# Patient Record
Sex: MALE | Race: WHITE | NOT HISPANIC OR LATINO | Employment: FULL TIME | URBAN - METROPOLITAN AREA
[De-identification: names, ages, dates, MRNs, and addresses within clinical notes are randomized per-mention and may not be internally consistent; named-entity substitution may affect disease eponyms.]

---

## 2017-07-31 ENCOUNTER — APPOINTMENT (OUTPATIENT)
Dept: GENERAL RADIOLOGY | Facility: CLINIC | Age: 37
End: 2017-07-31
Attending: EMERGENCY MEDICINE
Payer: COMMERCIAL

## 2017-07-31 ENCOUNTER — HOSPITAL ENCOUNTER (EMERGENCY)
Facility: CLINIC | Age: 37
Discharge: HOME | End: 2017-07-31
Attending: EMERGENCY MEDICINE | Admitting: EMERGENCY MEDICINE
Payer: COMMERCIAL

## 2017-07-31 ENCOUNTER — TELEPHONE (OUTPATIENT)
Dept: NURSING | Facility: CLINIC | Age: 37
End: 2017-07-31

## 2017-07-31 VITALS
SYSTOLIC BLOOD PRESSURE: 105 MMHG | WEIGHT: 175 LBS | DIASTOLIC BLOOD PRESSURE: 75 MMHG | HEART RATE: 59 BPM | RESPIRATION RATE: 18 BRPM | BODY MASS INDEX: 26.61 KG/M2 | OXYGEN SATURATION: 97 % | TEMPERATURE: 98.4 F

## 2017-07-31 DIAGNOSIS — R07.89 CHEST WALL PAIN: ICD-10-CM

## 2017-07-31 LAB
INTERPRETATION ECG - MUSE: NORMAL
TROPONIN I BLD-MCNC: 0 UG/L (ref 0–0.1)

## 2017-07-31 PROCEDURE — 99285 EMERGENCY DEPT VISIT HI MDM: CPT | Mod: 25

## 2017-07-31 PROCEDURE — 84484 ASSAY OF TROPONIN QUANT: CPT

## 2017-07-31 PROCEDURE — 71020 XR CHEST 2 VW: CPT

## 2017-07-31 PROCEDURE — 93005 ELECTROCARDIOGRAM TRACING: CPT

## 2017-07-31 ASSESSMENT — ENCOUNTER SYMPTOMS
LIGHT-HEADEDNESS: 0
BACK PAIN: 0
COUGH: 0
DYSURIA: 0
NAUSEA: 0
FREQUENCY: 0
CHILLS: 0
RHINORRHEA: 1
DIARRHEA: 0
VOMITING: 0
SHORTNESS OF BREATH: 0
FEVER: 0
ABDOMINAL PAIN: 0
HEADACHES: 0

## 2017-07-31 NOTE — TELEPHONE ENCOUNTER
"Asked to triage patient who is the list for being seen by NP for \"chest tightness\" and declined to be transferred to a Triage nurse.    Call to patient and it went to default VM.  LMTCB.  Need more information.    Also patient needs to establish care with a new PCP.  Maine Tubbs RN    "

## 2017-07-31 NOTE — ED AVS SNAPSHOT
Emergency Department    [address] [facility] [address]    Phone:  [phone]    Fax:  [phone]                                       Satnam Gonzales   MRN: [id number]    Department:   Emergency Department   Date of Visit:  7/31/2017           After Visit Summary Signature Page     I have received my discharge instructions, and my questions have been answered. I have discussed any challenges I see with this plan with the nurse or doctor.    ..........................................................................................................................................  Patient/Patient Representative Signature      ..........................................................................................................................................  Patient Representative Print Name and Relationship to Patient    ..................................................               ................................................  Date                                            Time    ..........................................................................................................................................  Reviewed by Signature/Title    ...................................................              ..............................................  Date                                                            Time

## 2017-07-31 NOTE — ED AVS SNAPSHOT
Emergency Department    [address] [facility] [address]    Phone:  [phone]    Fax:  [phone]                                       Satnam Gonzales   MRN: [id number]    Department:   Emergency Department   Date of Visit:  7/31/2017           Patient Information     Date Of Birth          1980        Your diagnoses for this visit were:     Chest wall pain        You were seen by Antonio Castaneda MD.      Follow-up Information     Follow up with [facility]. Schedule an appointment as soon as possible for a visit in 3 days.    Specialties:  Podiatry, Internal Medicine, Family Medicine    Contact information:    [address] [facility] [address]  [facility] [address] [phone]        Discharge Instructions         Chest Wall Pain: Costochondritis    The chest pain that you have had today is caused by costochondritis. This condition is caused by an inflammation of the cartilage joining your ribs to your breastbone. It is not caused by heart or lung problems. Your healthcare team has made sure that the chest pain you feel is not from a life threatening cause of chest pain such as heart attack, collapsed lung, blood clot in the lung, tear in the aorta, or esophageal rupture. The inflammation may have been brought on by a blow to the chest, lifting heavy objects, intense exercise, or an illness that made you cough and sneeze a lot. It often occurs during times of emotional stress. It can be painful, but it is not dangerous. It usually goes away in 1 to 2 weeks. But it may happen again. Rarely, a more serious condition may cause symptoms similar to costochondritis. That s why it s important to watch for the warning signs listed below.  Home care  Follow these guidelines when caring for yourself at home:    If you feel that emotional stress is a cause of your condition, try to figure out the sources of that stress. It may not be obvious. Learn ways to deal with the  stress in your life. This can include regular exercise, muscle relaxation, meditation, or simply taking time out for yourself.    You may use acetaminophen, ibuprofen, or naproxen to control pain, unless another pain medicine was prescribed. If you have liver or kidney disease or ever had a stomach ulcer, talk with your healthcare provider before using these medicines.    You can also help ease pain by using a hot, wet compress or heating pad. Use this with or without a medicated skin cream that helps relieves pain.    Do stretching exercise as advised by your provider.    Take any prescribed medicines as directed.  Follow-up care  Follow up with your healthcare provider, or as advised, if you do not start to get better in the next 2 days.  When to seek medical advice  Call your healthcare provider right away if any of these occur:    A change in the type of pain. Call if it feels different, becomes more serious, lasts longer, or spreads into your shoulder, arm, neck, jaw, or back.    Shortness of breath or pain gets worse when you breathe    Weakness, dizziness, or fainting    Cough with dark-colored sputum (phlegm) or blood    Abdominal pain    Dark red or black stools    Fever of 100.4 F (38 C) or higher, or as directed by your healthcare provider  Date Last Reviewed: 12/1/2016 2000-2017 [employer]. [address] [place] [address]. All rights reserved. This information is not intended as a substitute for professional medical care. Always follow your healthcare professional's instructions.          Future Appointments        Provider Department Dept Phone Center    8/1/2017 8:30 AM DEREK Chan [facility] [phone]       24 Hour Appointment Hotline       To make an appointment at any [facility], call [phone] ([phone]). If you don't have a family doctor or clinic, we will help you find one. [facility] are conveniently located to  serve the needs of you and your family.             Review of your medicines      Our records show that you are taking the medicines listed below. If these are incorrect, please call your family doctor or clinic.        Dose / Directions Last dose taken    NO ACTIVE MEDICATIONS        Refills:  0        ZYRTEC PO        Take  by mouth.   Refills:  0                Procedures and tests performed during your visit     EKG 12 lead    ISTAT troponin nursing POCT    Troponin POCT    XR Chest 2 Views      Orders Needing Specimen Collection     None      Pending Results     No orders found from 7/29/2017 to 8/1/2017.            Pending Culture Results     No orders found from 7/29/2017 to 8/1/2017.            Pending Results Instructions     If you had any lab results that were not finalized at the time of your Discharge, you can call the ED Lab Result RN at [phone]. You will be contacted by this team for any positive Lab results or changes in treatment. The nurses are available 7 days a week from 10A to 6:30P.  You can leave a message 24 hours per day and they will return your call.        Test Results From Your Hospital Stay        7/31/2017  7:15 PM      Narrative     XR CHEST 2 VW   7/31/2017 7:09 PM     HISTORY: Chest pain    COMPARISON: None.        Impression     IMPRESSION: Normal.    MOE CRUMP MD         7/31/2017  7:15 PM      Component Results     Component Value Ref Range & Units Status    Troponin I 0.00 0.00 - 0.10 ug/L Final                Clinical Quality Measure: Blood Pressure Screening     Your blood pressure was checked while you were in the emergency department today. The last reading we obtained was  BP: 105/75 . Please read the guidelines below about what these numbers mean and what you should do about them.  If your systolic blood pressure (the top number) is less than 120 and your diastolic blood pressure (the bottom number) is less than 80, then your blood pressure is normal. There is  "nothing more that you need to do about it.  If your systolic blood pressure (the top number) is 120-139 or your diastolic blood pressure (the bottom number) is 80-89, your blood pressure may be higher than it should be. You should have your blood pressure rechecked within a year by a primary care provider.  If your systolic blood pressure (the top number) is 140 or greater or your diastolic blood pressure (the bottom number) is 90 or greater, you may have high blood pressure. High blood pressure is treatable, but if left untreated over time it can put you at risk for heart attack, stroke, or kidney failure. You should have your blood pressure rechecked by a primary care provider within the next 4 weeks.  If your provider in the emergency department today gave you specific instructions to follow-up with your doctor or provider even sooner than that, you should follow that instruction and not wait for up to 4 weeks for your follow-up visit.        Thank you for choosing [facility]       Thank you for choosing [facility] for your care. Our goal is always to provide you with excellent care. Hearing back from our patients is one way we can continue to improve our services. Please take a few minutes to complete the written survey that you may receive in the mail after you visit with us. Thank you!        [employer] Information     [employer] lets you send messages to your doctor, view your test results, renew your prescriptions, schedule appointments and more. To sign up, go to [email or web address] . Click on \"Log in\" on the left side of the screen, which will take you to the Welcome page. Then click on \"Sign up Now\" on the right side of the page.     You will be asked to enter the access code listed below, as well as some personal information. Please follow the directions to create your username and password.     Your access code is: [email or web address]  Expires: 10/29/2017  7:53 PM     Your access code will  in 90 days. If you " need help or a new code, please call your [facility] clinic or [phone].        Care EveryWhere ID     This is your Care EveryWhere ID. This could be used by other organizations to access your [facility] medical records  [id number]        Equal Access to Services     MICHAEL US : Chelsea Mar, denny roblero, ladonna kaallori foss, amalia badillo. So [facility] [phone].    ATENCIÓN: Si habla español, tiene a rowland disposición servicios gratuitos de asistencia lingüística. Llame al [phone].    We comply with applicable federal civil rights laws and [place] laws. We do not discriminate on the basis of race, color, national origin, age, disability sex, sexual orientation or gender identity.            After Visit Summary       This is your record. Keep this with you and show to your community pharmacist(s) and doctor(s) at your next visit.

## 2017-07-31 NOTE — TELEPHONE ENCOUNTER
FYI: Patient has not been seen here since 2013:  Patient has history of early heart disease in his grandfather.  States he started having chest discomfort across his chest Friday with running, it subsided to the left side only, no radiation of pain or any other symptoms but it did not go away totally.  He has since been afraid to run or bike which his norm so he made an appointment to see the nurse Practioner tomorrow.    The discomfort has not subsided but it is right over his heart.  He is anxious about it now.    Patient instructed the only way to know what is happening and to rule out heart issues is to be assessed.  Instructed to get a ride emergently to the ED or call 911.    Patient promises he will seek emergency assessment.  He will also plan to re establish care in the near future.  Maine Tubbs RN

## 2017-07-31 NOTE — ED PROVIDER NOTES
History     Chief Complaint:  Chest Pain     HPI   Satnam Gonzales is an otherwise healthy, active, 37 year old male who presents with exertional chest pain. The patient reports he first developed sharp pain in his left chest while running 1.5 weeks ago. Pain did not radiate. He denies any shortness of breath. The pain subsided when he stopped running. He has continued to have the same pain whenever he runs and reports it is gradually worsening. The patient made an appointment to see his primary but was sent here instead for evaluation. On arrival to the ED, the patient is currently pain free. He states he had some nasal congestion last week but denies any recent cough or runny nose. He denies any nausea, vomiting, diarrhea, abdominal pain, headache, urinary symptoms, or leg swelling. No history of heart problems.     CARDIAC RISK FACTORS:  Sex:    Male  Tobacco:   No  Hypertension:   No  Hyperlipidemia:  No  Diabetes:   No  Family History:  Grandfather    PE/DVT RISK FACTORS:  Sex:    Male  Hormones:   No  Tobacco:   No  Cancer:   No  Travel:   No  Surgery:   No  Other immobilization: No  Personal history:  No  Family history:  No    Allergies:  No Known Allergies     Medications:    Zyrtec    Past Medical History:    Depression     Past Surgical History:    History reviewed. No pertinent surgical history.    Family History:    CAD, grandfather age 50    Social History:  Smoking status: No  Alcohol use: No  Marital Status:   [2]     Review of Systems   Constitutional: Negative for chills and fever.   HENT: Positive for rhinorrhea. Negative for congestion.    Respiratory: Negative for cough and shortness of breath.    Cardiovascular: Positive for chest pain. Negative for leg swelling.   Gastrointestinal: Negative for abdominal pain, diarrhea, nausea and vomiting.   Genitourinary: Negative for dysuria, frequency and urgency.   Musculoskeletal: Negative for back pain.   Neurological: Negative for  light-headedness and headaches.   All other systems reviewed and are negative.      Physical Exam   Patient Vitals for the past 24 hrs:   BP Temp Temp src Pulse Heart Rate Resp SpO2 Weight   07/31/17 1952 105/75 - - 59 - 18 97 % -   07/31/17 1824 124/84 98.4  F (36.9  C) Oral - 59 14 100 % 79.4 kg (175 lb)       Physical Exam  Nursing note and vitals reviewed.  Constitutional:   Awake alert  HENT:   Pharynx normal, tms normal, atraumatic  Mouth/Throat:  Oropharynx is clear and moist.   Eyes:    Conjunctivae normal and EOM are normal. Pupils are equal, round, and reactive to light.   Neck:    Normal range of motion. Neck supple. No tracheal deviation present.   Cardiovascular: Normal rate, regular rhythm, normal heart sounds and intact distal pulses.    Pulmonary/Chest:  Effort normal and breath sounds normal. No respiratory distress. No wheezes. No rales. No tenderness.   Abdominal:   Soft. The patient exhibits no mass. There is no tenderness. There is no rebound and no guarding.   Musculoskeletal:  Normal range of motion. No edema and no tenderness.   Lymphadenopathy:  No cervical adenopathy.   Neurological:  Alert and oriented to person, place, and time. No cranial nerve deficit. Normal muscle tone.   Skin:    Skin is warm and dry.   Psychiatric:   Normal mood and affect.       Emergency Department Course   ECG (18:22:51):  Rate 55 bpm. LA interval 172. QRS duration 108. QT/QTc 422/403. P-R-T axes 55 14 39. Sinus bradycardia. Otherwise normal ECG   Interpreted at 1847 by Antonio Castaneda MD.    Imaging:  Radiographic findings were communicated with the patient who voiced understanding of the findings.    X-ray Chest, 2 views:  Normal   Result per radiology.     Laboratory:  Troponin POCT: 0.00    Emergency Department Course:  Past medical records, nursing notes, and vitals reviewed.  1849: I performed an exam of the patient and obtained history, as documented above.  IV inserted and blood drawn.  ECG obtained,  results above.   The patient was sent for a chest x-ray while in the emergency department, findings above.    PERC negative    : I rechecked the patient. Explained findings to the patient.    I rechecked the patient.  Findings and plan explained to the Patient. Patient discharged home with instructions regarding supportive care, medications, and reasons to return. The importance of close follow-up was reviewed.     Impression & Plan      Medical Decision Makin year old male who is healthy and only cardiac risk factors are family history of his grandfather with some heart disease. The patient is an avid runner. About a week ago he started having some left sided sharp chest [place]. No pain with rest or nonexertion. The pain is sharp and stabbing. No shortness of breath. PERC score is negative. Chest x-ray is negative, no widened mediastinum or sign of dissection or aneurysm. ECG shows sinus bradycardia but no acute changes and troponin is negative. I think most likely this is chest wall pain. No sign of coronary ischemia and no sign of pulmonary embolism. Will discharge to home, follow up with [facility] and trial of over the counter Ibuprofen.     Diagnosis:    ICD-10-CM   1. Chest wall pain R07.89     Disposition: Discharged to home    Any Traylor  2017    EMERGENCY DEPARTMENT    I, Any Traylor, am serving as a scribe at 6:49 PM on 2017 to document services personally performed by Antonio Castaneda MD based on my observations and the provider's statements to me.        Antonio Castaneda MD  17 [phone]

## 2017-08-01 NOTE — DISCHARGE INSTRUCTIONS
Chest Wall Pain: Costochondritis    The chest pain that you have had today is caused by costochondritis. This condition is caused by an inflammation of the cartilage joining your ribs to your breastbone. It is not caused by heart or lung problems. Your healthcare team has made sure that the chest pain you feel is not from a life threatening cause of chest pain such as heart attack, collapsed lung, blood clot in the lung, tear in the aorta, or esophageal rupture. The inflammation may have been brought on by a blow to the chest, lifting heavy objects, intense exercise, or an illness that made you cough and sneeze a lot. It often occurs during times of emotional stress. It can be painful, but it is not dangerous. It usually goes away in 1 to 2 weeks. But it may happen again. Rarely, a more serious condition may cause symptoms similar to costochondritis. That s why it s important to watch for the warning signs listed below.  Home care  Follow these guidelines when caring for yourself at home:    If you feel that emotional stress is a cause of your condition, try to figure out the sources of that stress. It may not be obvious. Learn ways to deal with the stress in your life. This can include regular exercise, muscle relaxation, meditation, or simply taking time out for yourself.    You may use acetaminophen, ibuprofen, or naproxen to control pain, unless another pain medicine was prescribed. If you have liver or kidney disease or ever had a stomach ulcer, talk with your healthcare provider before using these medicines.    You can also help ease pain by using a hot, wet compress or heating pad. Use this with or without a medicated skin cream that helps relieves pain.    Do stretching exercise as advised by your provider.    Take any prescribed medicines as directed.  Follow-up care  Follow up with your healthcare provider, or as advised, if you do not start to get better in the next 2 days.  When to seek medical  advice  Call your healthcare provider right away if any of these occur:    A change in the type of pain. Call if it feels different, becomes more serious, lasts longer, or spreads into your shoulder, arm, neck, jaw, or back.    Shortness of breath or pain gets worse when you breathe    Weakness, dizziness, or fainting    Cough with dark-colored sputum (phlegm) or blood    Abdominal pain    Dark red or black stools    Fever of 100.4 F (38 C) or higher, or as directed by your healthcare provider  Date Last Reviewed: 12/1/2016 2000-2017 [employer]. [address] [place] [address]. All rights reserved. This information is not intended as a substitute for professional medical care. Always follow your healthcare professional's instructions.

## 2017-08-03 ENCOUNTER — OFFICE VISIT (OUTPATIENT)
Dept: FAMILY MEDICINE | Facility: CLINIC | Age: 37
End: 2017-08-03
Payer: COMMERCIAL

## 2017-08-03 VITALS
BODY MASS INDEX: 26.36 KG/M2 | HEART RATE: 76 BPM | OXYGEN SATURATION: 99 % | SYSTOLIC BLOOD PRESSURE: 109 MMHG | TEMPERATURE: 97.3 F | HEIGHT: 69 IN | DIASTOLIC BLOOD PRESSURE: 70 MMHG | WEIGHT: 178 LBS

## 2017-08-03 DIAGNOSIS — K20.0 EOSINOPHILIC ESOPHAGITIS: Primary | ICD-10-CM

## 2017-08-03 DIAGNOSIS — J30.2 CHRONIC SEASONAL ALLERGIC RHINITIS, UNSPECIFIED TRIGGER: ICD-10-CM

## 2017-08-03 DIAGNOSIS — R07.89 ATYPICAL CHEST PAIN: ICD-10-CM

## 2017-08-03 DIAGNOSIS — R13.19 ESOPHAGEAL DYSPHAGIA: ICD-10-CM

## 2017-08-03 PROCEDURE — 99203 OFFICE O/P NEW LOW 30 MIN: CPT | Performed by: INTERNAL MEDICINE

## 2017-08-03 NOTE — MR AVS SNAPSHOT
After Visit Summary   8/3/2017    Satnam Gonzales    MRN: [id number]           Patient Information     Date Of Birth          1980        Visit Information        Provider Department      8/3/2017 1:00 PM Chel Beaulieu MD [facility]        Today's Diagnoses     Eosinophilic esophagitis    -  1    Chronic seasonal allergic rhinitis, unspecified trigger        Atypical chest pain        Esophageal dysphagia          Care Instructions      Preventive Health Recommendations  Male Ages 26 - 39    Yearly exam:             See your health care provider every year in order to  o   Review health changes.   o   Discuss preventive care.    o   Review your medicines if your doctor has prescribed any.    You should be tested each year for STDs (sexually transmitted diseases), if you re at risk.     After age 35, talk to your provider about cholesterol testing. If you are at risk for heart disease, have your cholesterol tested at least every 5 years.     If you are at risk for diabetes, you should have a diabetes test (fasting glucose).  Shots: Get a flu shot each year. Get a tetanus shot every 10 years.     Nutrition:    Eat at least 5 servings of fruits and vegetables daily.     Eat whole-grain bread, whole-wheat pasta and brown rice instead of white grains and rice.     Talk to your provider about Calcium and Vitamin D.     Lifestyle    Exercise for at least 150 minutes a week (30 minutes a day, 5 days a week). This will help you control your weight and prevent disease.     Limit alcohol to one drink per day.     No smoking.     Wear sunscreen to prevent skin cancer.     See your dentist every six months for an exam and cleaning.             Follow-ups after your visit        Additional Services     ALLERGY/ASTHMA ADULT REFERRAL       Your provider has referred you to: N: [facility] [place] [phone]   [email or web address]    Please be  aware that coverage of these services is subject to the terms and limitations of your health insurance plan.  Call member services at your health plan with any benefit or coverage questions.      Please bring the following with you to your appointment:    (1) Any X-Rays, CTs or MRIs which have been performed.  Contact the facility where they were done to arrange for  prior to your scheduled appointment.    (2) List of current medications  (3) This referral request   (4) Any documents/labs given to you for this referral            GASTROENTEROLOGY ADULT REF CONSULT ONLY       [facility] [phone]      Please be aware that coverage of these services is subject to the terms and limitations of your health insurance plan.  Call member services at your health plan with any benefit or coverage questions.  Any procedures must be performed at a [place] facility OR coordinated by your clinic's referral office.    Please bring the following with you to your appointment:    (1) Any X-Rays, CTs or MRIs which have been performed.  Contact the facility where they were done to arrange for  prior to your scheduled appointment.    (2) List of current medications   (3) This referral request   (4) Any documents/labs given to you for this referral                  Follow-up notes from your care team     Return in about 1 month (around 9/3/2017).      Your next 10 appointments already scheduled     Sep 07, 2017  4:00 PM CDT   [facility]    [address] [address] [phone]           Bring a current list of meds and any records pertaining to this visit. For Physicals, please bring immunization records and any forms needing to be filled out. Please arrive 10 minutes early to complete paperwork.              Who to contact     If you have questions or need follow up information  "about today's clinic visit or your schedule please contact [facility] directly at [phone].  Normal or non-critical lab and imaging results will be communicated to you by [employer], letter or phone within 4 business days after the clinic has received the results. If you do not hear from us within 7 days, please contact the clinic through [employer] or phone. If you have a critical or abnormal lab result, we will notify you by phone as soon as possible.  Submit refill requests through [employer] or call your pharmacy and they will forward the refill request to us. Please allow 3 business days for your refill to be completed.          Additional Information About Your Visit        [employer] Information     [employer] lets you send messages to your doctor, view your test results, renew your prescriptions, schedule appointments and more. To sign up, go to [email or web address] . Click on \"Log in\" on the left side of the screen, which will take you to the Welcome page. Then click on \"Sign up Now\" on the right side of the page.     You will be asked to enter the access code listed below, as well as some personal information. Please follow the directions to create your username and password.     Your access code is: [email or web address]  Expires: 10/29/2017  7:53 PM     Your access code will  in 90 days. If you need help or a new code, please call your [place] clinic or [phone].        Care EveryWhere ID     This is your Care EveryWhere ID. This could be used by other organizations to access your [place] medical records  [id number]        Your Vitals Were     Pulse Temperature Height Pulse Oximetry BMI (Body Mass Index)       76 97.3  F (36.3  C) (Oral) 5' 8.5\" (1.74 m) 99% 26.67 kg/m2        Blood Pressure from Last 3 Encounters:   17 109/70   17 105/75   13 102/68    Weight from Last 3 Encounters:   17 178 lb (80.7 kg)   17 175 lb (79.4 kg)   13 177 lb (80.3 kg)            "   We Performed the Following     ALLERGY/ASTHMA ADULT REFERRAL     GASTROENTEROLOGY ADULT REF CONSULT ONLY        Primary Care Provider Office Phone # Fax #    Chel Jose Beaulieu [phone]       [facility] [address] [facility] [address]  [facility] [address]        Equal Access to Services     MICHAEL US : Hadii aad ku hadasho Soomaali, waaxda luqadaha, qaybta kaalmada adeegyada, waxay idiin hayaan adeeg kharash la'aan . So [facility] [phone].    ATENCIÓN: Si habla español, tiene a rowland disposición servicios gratuitos de asistencia lingüística. Rickey al [phone].    We comply with applicable federal civil rights laws and [place] laws. We do not discriminate on the basis of race, color, national origin, age, disability sex, sexual orientation or gender identity.            Thank you!     Thank you for choosing [facility]  for your care. Our goal is always to provide you with excellent care. Hearing back from our patients is one way we can continue to improve our services. Please take a few minutes to complete the written survey that you may receive in the mail after your visit with us. Thank you!             Your Updated Medication List - Protect others around you: Learn how to safely use, store and throw away your medicines at www.disposemymeds.org.          This list is accurate as of: 8/3/17  2:36 PM.  Always use your most recent med list.                   Brand Name Dispense Instructions for use Diagnosis    NO ACTIVE MEDICATIONS           ZYRTEC PO      Take  by mouth.

## 2017-08-03 NOTE — PROGRESS NOTES
Chief Complaint:     Post ED discharge follow up on atypical chest pain, chronic dysphagia       HPI:   Patient Satnam Gonzales is a very pleasant 37 year old male with history of chronic allergic rhinitis, seasonal allergies, dysphagia and family history of both food and seasonal allergies and eosinophilic esophagitis  who presents to Internal Medicine clinic today for post ED discharge follow up of recent atypical chest pain. Regarding the patient's chronic allergic rhinitis and seasonal allergies, the patient is currently maintained on zyrtec anti-histamine medication for treatment. He has never been for tested for food allergies. His father previously diagnosed with eosinophilic esophagitis and is on chronic budesonide steroid medication for treatment.  Regarding the patient's chronic dysphagia, the patient reports that for the past several years, he has been having more and more frequent episodes of dysphagia where he feels that food is stuck in the chest, causing central chest pain and chest discomfort. He presented to the ED recently with an episode of chest discomfort where he was ruled out for acute coronary syndrome.       Current Medications:     Current Outpatient Prescriptions   Medication Sig Dispense Refill     NO ACTIVE MEDICATIONS        Cetirizine HCl (ZYRTEC PO) Take  by mouth.           Allergies:      Allergies   Allergen Reactions     Seasonal Allergies             Past Medical History:     Past Medical History:   Diagnosis Date     Depression          Past Surgical History:     Past Surgical History:   Procedure Laterality Date     NO HISTORY OF SURGERY           Family Medical History:     Family History   Problem Relation Age of Onset     C.A.D. Paternal Grandfather 50     Alzheimer Disease Maternal Grandfather 73     Food Allergy Father      Seasonal/Environmental Allergies Brother          Social History:     Social History     Social History     Marital status:      Spouse name: N/A  "    Number of children: N/A     Years of education: N/A     Occupational History     Not on file.     Social History Main Topics     Smoking status: Former Smoker     Packs/day: 1.00     Years: 10.00     Smokeless tobacco: Current User     Types: Chew     Alcohol use No     Drug use: No     Sexual activity: Yes     Partners: Female     Other Topics Concern     Not on file     Social History Narrative           Review of System:     Constitutional: Negative for fever or chills  Skin: Negative for rashes  Ears/Nose/Throat: Positive for chronic allergic rhinitis, nasal congestion, seasonal allergies  Respiratory: No shortness of breath, dyspnea on exertion, cough, or hemoptysis  Cardiovascular: Negative for chest pain  Gastrointestinal: Positive for chronic esophageal dysphagia symptoms  Genitourinary: Negative for dysuria, hematuria  Musculoskeletal: Negative for myalgias  Neurologic: Negative for headaches  Psychiatric: Negative for depression, anxiety  Hematologic/Lymphatic/Immunologic: Negative  Endocrine: Negative  Behavioral: Negative for tobacco use       Physical Exam:   /70 (BP Location: Right arm, Patient Position: Sitting, Cuff Size: Adult Regular)  Pulse 76  Temp 97.3  F (36.3  C) (Oral)  Ht 5' 8.5\" (1.74 m)  Wt 178 lb (80.7 kg)  SpO2 99%  BMI 26.67 kg/m2    GENERAL: healthy, alert and no distress  EYES: eyes grossly normal to inspection, and conjunctivae and sclerae normal  HENT: Normocephalic atraumatic. Nose and mouth without ulcers or lesions. Nasal congestion and rhinitis symptoms present.  NECK: supple  RESP: lungs clear to auscultation   CV: regular rate and rhythm, normal S1 S2  LYMPH: no peripheral edema   ABDOMEN: nondistended  MS: no gross musculoskeletal defects noted  SKIN: no suspicious lesions or rashes  NEURO: Alert & Oriented x 3.   PSYCH: mentation appears normal, affect normal        Diagnostic Test Results:     Results for orders placed or performed during the hospital " encounter of 07/31/17   XR Chest 2 Views    Narrative    XR CHEST 2 VW   7/31/2017 7:09 PM     HISTORY: Chest pain    COMPARISON: None.      Impression    IMPRESSION: Normal.    MOE CRUMP MD   EKG 12 lead   Result Value Ref Range    Interpretation ECG Click View Image link to view waveform and result    Troponin POCT   Result Value Ref Range    Troponin I 0.00 0.00 - 0.10 ug/L         ASSESSMENT/PLAN:       (K20.0) Eosinophilic esophagitis  (primary encounter diagnosis)  (R13.14) Esophageal dysphagia  Comment: Patient's chronic esophageal dysphagia symptoms is most likely due to undiagnosed eosinophilic esophagitis in the setting of chronic allergies and a positive family history of eosinophilic esophagitis.  Plan: I have ordered GASTROENTEROLOGY ADULT REF CONSULT for GI specialist evaluation of eosinophilic esophagitis including possible Upper EGD endoscopy. I have also ordered a referral for ALLERGY/ASTHMA ADULT REFERRAL for outpatient food allergy testing.      (R07.89) Atypical chest pain  Comment: Patient was ruled out for acute coronary syndrome. Atypical chest pain is likely esophageal in etiology due to chronic esophageal  dysphagia and undiagnosed eosinophilic esophagitis.  Plan: Referral for both food allergy testing and GI evaluation for likely undiagnosed eosinophilic esophagitis.      (J30.2) Chronic seasonal allergic rhinitis, unspecified trigger  Comment: Patient is currently on Zyrtec medication for treatment of chronic seasonal allergies and allergic rhinitis.  Plan: Patient is advised to hold his Zyrtec anti-histamine medication starting now until after his ALLERGY/ASTHMA ADULT REFERRAL appointment for food allergy testing to ensure accuracy of the testing results.        Follow Up Plan:     Patient is instructed to return to Internal Medicine clinic for follow-up visit in 1 month or sooner as needed.        Chel Beaulieu MD  Internal Medicine  [facility]

## 2017-08-03 NOTE — NURSING NOTE
"Chief Complaint   Patient presents with     Establish Care     Physical       Initial /70 (BP Location: Right arm, Patient Position: Sitting, Cuff Size: Adult Regular)  Pulse 76  Temp 97.3  F (36.3  C) (Oral)  Ht 5' 8.5\" (1.74 m)  Wt 178 lb (80.7 kg)  SpO2 99%  BMI 26.67 kg/m2 Estimated body mass index is 26.67 kg/(m^2) as calculated from the following:    Height as of this encounter: 5' 8.5\" (1.74 m).    Weight as of this encounter: 178 lb (80.7 kg).  Medication Reconciliation: complete   Anastasiya Duncan- CMA      "

## 2017-08-08 ENCOUNTER — TRANSFERRED RECORDS (OUTPATIENT)
Dept: HEALTH INFORMATION MANAGEMENT | Facility: CLINIC | Age: 37
End: 2017-08-08

## 2017-08-19 ENCOUNTER — TRANSFERRED RECORDS (OUTPATIENT)
Dept: HEALTH INFORMATION MANAGEMENT | Facility: CLINIC | Age: 37
End: 2017-08-19

## 2017-08-21 ENCOUNTER — TRANSFERRED RECORDS (OUTPATIENT)
Dept: HEALTH INFORMATION MANAGEMENT | Facility: CLINIC | Age: 37
End: 2017-08-21

## 2017-09-07 ENCOUNTER — OFFICE VISIT (OUTPATIENT)
Dept: FAMILY MEDICINE | Facility: CLINIC | Age: 37
End: 2017-09-07
Payer: COMMERCIAL

## 2017-09-07 VITALS
BODY MASS INDEX: 26.36 KG/M2 | HEIGHT: 69 IN | HEART RATE: 58 BPM | DIASTOLIC BLOOD PRESSURE: 70 MMHG | SYSTOLIC BLOOD PRESSURE: 108 MMHG | OXYGEN SATURATION: 100 % | TEMPERATURE: 98 F | WEIGHT: 178 LBS

## 2017-09-07 DIAGNOSIS — K20.0 EOSINOPHILIC ESOPHAGITIS: Primary | ICD-10-CM

## 2017-09-07 PROCEDURE — 99213 OFFICE O/P EST LOW 20 MIN: CPT | Performed by: INTERNAL MEDICINE

## 2017-09-07 RX ORDER — FLUTICASONE PROPIONATE 110 UG/1
2 AEROSOL, METERED RESPIRATORY (INHALATION) 2 TIMES DAILY
COMMUNITY
End: 2018-10-15

## 2017-09-07 RX ORDER — PANTOPRAZOLE SODIUM 40 MG/1
40 TABLET, DELAYED RELEASE ORAL DAILY
COMMUNITY
End: 2017-09-07

## 2017-09-07 RX ORDER — PANTOPRAZOLE SODIUM 40 MG/1
40 TABLET, DELAYED RELEASE ORAL DAILY
Qty: 30 TABLET | Refills: 11 | Status: SHIPPED | OUTPATIENT
Start: 2017-09-07 | End: 2018-02-08

## 2017-09-07 NOTE — PROGRESS NOTES
Chief Complaint:     Eosinophilic esophagitis    HPI:   Patient Satnam Gonzales is a very pleasant 37 year old male with history of recent diagnosis of eosinophilic esophagitis who presents to Internal Medicine clinic today for follow up of eosinophilic esophagitis. Regarding the patient's eosinophilic esophagitis, the patient is due for a refill of his Protonix medication for treatment of eosinophilic esophagitis. Patient recently underwent upper EGD endoscopy procedure which confirmed the diagnosis of eosinophilic esophagitis. Patient also has a positive family history of eosinophilic esophagitis.        Current Medications:     Current Outpatient Prescriptions   Medication Sig Dispense Refill     fluticasone (FLOVENT HFA) 110 MCG/ACT Inhaler Inhale 2 puffs into the lungs 2 times daily       pantoprazole (PROTONIX) 40 MG EC tablet Take 1 tablet (40 mg) by mouth daily Take 30-60 minutes before a meal. 30 tablet 11     Cetirizine HCl (ZYRTEC PO) Take  by mouth.           Allergies:      Allergies   Allergen Reactions     Seasonal Allergies             Past Medical History:     Past Medical History:   Diagnosis Date     Depression          Past Surgical History:     Past Surgical History:   Procedure Laterality Date     NO HISTORY OF SURGERY           Family Medical History:     Family History   Problem Relation Age of Onset     C.A.D. Paternal Grandfather 50     Alzheimer Disease Maternal Grandfather 73     Food Allergy Father      Seasonal/Environmental Allergies Brother          Social History:     Social History     Social History     Marital status:      Spouse name: N/A     Number of children: N/A     Years of education: N/A     Occupational History     Not on file.     Social History Main Topics     Smoking status: Former Smoker     Packs/day: 1.00     Years: 10.00     Smokeless tobacco: Current User     Types: Chew     Alcohol use No     Drug use: No     Sexual activity: Yes     Partners: Female  "    Other Topics Concern     Not on file     Social History Narrative           Review of System:     Constitutional: Negative for fever or chills  Skin: Negative for rashes  Ears/Nose/Throat: Negative for nasal congestion, sore throat  Respiratory: No shortness of breath, dyspnea on exertion, cough, or hemoptysis  Cardiovascular: Negative for chest pain  Gastrointestinal: Positive for recent diagnosis of eosinophilic esophagitis.  Genitourinary: Negative for dysuria, hematuria  Musculoskeletal: Negative for myalgias  Neurologic: Negative for headaches  Psychiatric: Negative for depression, anxiety  Hematologic/Lymphatic/Immunologic: Negative  Endocrine: Negative  Behavioral: Negative for tobacco use       Physical Exam:   /70 (BP Location: Right arm, Patient Position: Sitting, Cuff Size: Adult Large)  Pulse 58  Temp 98  F (36.7  C) (Oral)  Ht 5' 8.5\" (1.74 m)  Wt 178 lb (80.7 kg)  SpO2 100%  BMI 26.67 kg/m2    GENERAL: healthy, alert and no distress  EYES: eyes grossly normal to inspection, and conjunctivae and sclerae normal  HENT: Normocephalic atraumatic. Nose and mouth without ulcers or lesions  NECK: supple  RESP: lungs clear to auscultation   CV: regular rate and rhythm, normal S1 S2  LYMPH: no peripheral edema   ABDOMEN: nondistended  MS: no gross musculoskeletal defects noted  SKIN: no suspicious lesions or rashes  NEURO: Alert & Oriented x 3.   PSYCH: mentation appears normal, affect normal      ASSESSMENT/PLAN:       (K20.0) Eosinophilic esophagitis  (primary encounter diagnosis)  Comment: symptoms currently well controlled on Protonix medication.  Plan: I have refilled the patient's pantoprazole (PROTONIX) 40 MG EC tablet medication today. Patient is also advised to continue his routine GI specialist clinic follow up of eosinophilic esophagitis going forward.        Follow Up Plan:     Patient is instructed to return to Internal Medicine clinic for follow-up visit in 6 months or sooner as " needed.        Chel Beaulieu MD  Internal Medicine  [facility]

## 2017-09-07 NOTE — MR AVS SNAPSHOT
"              After Visit Summary   9/7/2017    Satnam Gonzales    MRN: [id number]           Patient Information     Date Of Birth          1980        Visit Information        Provider Department      9/7/2017 4:00 PM Chel Beaulieu MD [facility]        Today's Diagnoses     Eosinophilic esophagitis    -  1       Follow-ups after your visit        Follow-up notes from your care team     Return in about 6 months (around 3/7/2018).      Your next 10 appointments already scheduled     Feb 08, 2018  4:00 PM CST   Office Visit with Chel Beaulieu MD   [facility]    [address] [address] [phone]           Bring a current list of meds and any records pertaining to this visit. For Physicals, please bring immunization records and any forms needing to be filled out. Please arrive 10 minutes early to complete paperwork.              Who to contact     If you have questions or need follow up information about today's clinic visit or your schedule please contact [facility] directly at [phone].  Normal or non-critical lab and imaging results will be communicated to you by [employer], letter or phone within 4 business days after the clinic has received the results. If you do not hear from us within 7 days, please contact the clinic through [employer] or phone. If you have a critical or abnormal lab result, we will notify you by phone as soon as possible.  Submit refill requests through [employer] or call your pharmacy and they will forward the refill request to us. Please allow 3 business days for your refill to be completed.          Additional Information About Your Visit        [employer] Information     [employer] lets you send messages to your doctor, view your test results, renew your prescriptions, schedule appointments and more. To sign up, go to [email or web address] . Click on \"Log in\" on the left side of the screen, which will " "take you to the Welcome page. Then click on \"Sign up Now\" on the right side of the page.     You will be asked to enter the access code listed below, as well as some personal information. Please follow the directions to create your username and password.     Your access code is: [email or web address]  Expires: 10/29/2017  7:53 PM     Your access code will  in 90 days. If you need help or a new code, please call your [facility] or [phone].        Care EveryWhere ID     This is your Care EveryWhere ID. This could be used by other organizations to access your [place] medical records  [id number]        Your Vitals Were     Pulse Temperature Height Pulse Oximetry BMI (Body Mass Index)       58 98  F (36.7  C) (Oral) 5' 8.5\" (1.74 m) 100% 26.67 kg/m2        Blood Pressure from Last 3 Encounters:   17 108/70   17 109/70   17 105/75    Weight from Last 3 Encounters:   17 178 lb (80.7 kg)   17 178 lb (80.7 kg)   17 175 lb (79.4 kg)              Today, you had the following     No orders found for display         Today's Medication Changes          These changes are accurate as of: 17  5:15 PM.  If you have any questions, ask your nurse or doctor.               Stop taking these medicines if you haven't already. Please contact your care team if you have questions.     NO ACTIVE MEDICATIONS   Stopped by:  Chel Beaulieu MD                Where to get your medicines      Some of these will need a paper prescription and others can be bought over the counter.  Ask your nurse if you have questions.     Bring a paper prescription for each of these medications     pantoprazole 40 MG EC tablet                Primary Care Provider Office Phone # Fax #    Chel Beaulieu [phone]       [facility] [address]  [place] [address]        Equal Access to Services     [facility]: Chelsea Mar, denny roblero, ladonna jacobsen " amalia fossjuanpablo barroso ah. [facility] [phone].    ATENCIÓN: Si habla lorenza, tiene a rowland disposición servicios gratuitos de asistencia lingüística. Rickey al [phone].    We comply with applicable federal civil rights laws and [place] laws. We do not discriminate on the basis of race, color, national origin, age, disability sex, sexual orientation or gender identity.            Thank you!     Thank you for choosing [facility]  for your care. Our goal is always to provide you with excellent care. Hearing back from our patients is one way we can continue to improve our services. Please take a few minutes to complete the written survey that you may receive in the mail after your visit with us. Thank you!             Your Updated Medication List - Protect others around you: Learn how to safely use, store and throw away your medicines at www.disposemymeds.org.          This list is accurate as of: 9/7/17  5:15 PM.  Always use your most recent med list.                   Brand Name Dispense Instructions for use Diagnosis    FLOVENT  MCG/ACT Inhaler   Generic drug:  fluticasone      Inhale 2 puffs into the lungs 2 times daily        pantoprazole 40 MG EC tablet    PROTONIX    30 tablet    Take 1 tablet (40 mg) by mouth daily Take 30-60 minutes before a meal.    Eosinophilic esophagitis       ZYRTEC PO      Take  by mouth.

## 2018-02-08 ENCOUNTER — OFFICE VISIT (OUTPATIENT)
Dept: FAMILY MEDICINE | Facility: CLINIC | Age: 38
End: 2018-02-08
Payer: COMMERCIAL

## 2018-02-08 VITALS
HEIGHT: 69 IN | TEMPERATURE: 97.8 F | OXYGEN SATURATION: 95 % | WEIGHT: 181.8 LBS | DIASTOLIC BLOOD PRESSURE: 68 MMHG | SYSTOLIC BLOOD PRESSURE: 105 MMHG | BODY MASS INDEX: 26.93 KG/M2 | HEART RATE: 61 BPM

## 2018-02-08 DIAGNOSIS — Z12.5 SCREENING FOR PROSTATE CANCER: ICD-10-CM

## 2018-02-08 DIAGNOSIS — Z00.00 ROUTINE HISTORY AND PHYSICAL EXAMINATION OF ADULT: Primary | ICD-10-CM

## 2018-02-08 DIAGNOSIS — Z13.220 LIPID SCREENING: ICD-10-CM

## 2018-02-08 DIAGNOSIS — K20.0 EOSINOPHILIC ESOPHAGITIS: ICD-10-CM

## 2018-02-08 DIAGNOSIS — Z13.1 SCREENING FOR DIABETES MELLITUS: ICD-10-CM

## 2018-02-08 PROCEDURE — 99395 PREV VISIT EST AGE 18-39: CPT | Performed by: INTERNAL MEDICINE

## 2018-02-08 RX ORDER — PANTOPRAZOLE SODIUM 40 MG/1
40 TABLET, DELAYED RELEASE ORAL DAILY
Qty: 30 TABLET | Refills: 11 | Status: SHIPPED | OUTPATIENT
Start: 2018-02-08 | End: 2019-02-20

## 2018-02-08 NOTE — NURSING NOTE
"Chief Complaint   Patient presents with     Follow Up For     Eosinophilic esophagitis         Initial /68 (BP Location: Left arm, Cuff Size: Adult Large)  Pulse 61  Temp 97.8  F (36.6  C) (Oral)  Ht 5' 8.5\" (1.74 m)  Wt 181 lb 12.8 oz (82.5 kg)  SpO2 95%  BMI 27.24 kg/m2 Estimated body mass index is 27.24 kg/(m^2) as calculated from the following:    Height as of this encounter: 5' 8.5\" (1.74 m).    Weight as of this encounter: 181 lb 12.8 oz (82.5 kg).  Medication Reconciliation: complete   Joana Sin MA  "

## 2018-02-08 NOTE — MR AVS SNAPSHOT
After Visit Summary   2/8/2018    Satnam Gonzales    MRN: [id number]           Patient Information     Date Of Birth          1980        Visit Information        Provider Department      2/8/2018 4:00 PM Chel Beaulieu MD [facility]        Today's Diagnoses     Routine history and physical examination of adult    -  1    Screening for diabetes mellitus        Screening for prostate cancer        Lipid screening        Eosinophilic esophagitis           Follow-ups after your visit        Follow-up notes from your care team     Return in about 1 year (around 2/8/2019).      Future tests that were ordered for you today     Open Future Orders        Priority Expected Expires Ordered    Lipid panel reflex to direct LDL Fasting Routine  2/8/2019 2/8/2018    **CBC with platelets FUTURE anytime Routine 2/8/2018 2/8/2019 2/8/2018    **Basic metabolic panel FUTURE anytime Routine 2/8/2018 2/8/2019 2/8/2018    **A1C FUTURE anytime Routine 2/8/2018 2/8/2019 2/8/2018            Who to contact     If you have questions or need follow up information about today's clinic visit or your schedule please contact [facility] directly at [phone].  Normal or non-critical lab and imaging results will be communicated to you by MyChart, letter or phone within 4 business days after the clinic has received the results. If you do not hear from us within 7 days, please contact the clinic through [employer] or phone. If you have a critical or abnormal lab result, we will notify you by phone as soon as possible.  Submit refill requests through [employer] or call your pharmacy and they will forward the refill request to us. Please allow 3 business days for your refill to be completed.          Additional Information About Your Visit        MyChart Information     [employer] lets you send messages to your doctor, view your test results, renew your prescriptions, schedule appointments and more. To sign up, go  "to [email or web address] . Click on \"Log in\" on the left side of the screen, which will take you to the Welcome page. Then click on \"Sign up Now\" on the right side of the page.     You will be asked to enter the access code listed below, as well as some personal information. Please follow the directions to create your username and password.     Your access code is: [email or web address]  Expires: 2018  4:16 PM     Your access code will  in 90 days. If you need help or a new code, please call your [place] clinic or [phone].        Care EveryWhere ID     This is your Care EveryWhere ID. This could be used by other organizations to access your [place] medical records  [id number]        Your Vitals Were     Pulse Temperature Height Pulse Oximetry BMI (Body Mass Index)       61 97.8  F (36.6  C) (Oral) 5' 8.5\" (1.74 m) 95% 27.24 kg/m2        Blood Pressure from Last 3 Encounters:   18 105/68   17 108/70   17 109/70    Weight from Last 3 Encounters:   18 181 lb 12.8 oz (82.5 kg)   17 178 lb (80.7 kg)   17 178 lb (80.7 kg)              We Performed the Following     Expect Labs ACCESS CODE - Add PCP and Click on cosign on right          Where to get your medicines      These medications were sent to [place] [address] [place] [facility] [address]  [address] [facility] [address]     Phone:  [phone]     pantoprazole 40 MG EC tablet          Primary Care Provider Office Phone # Fax #    Chel Jose Beaulieu [phone] [address] [place] [address]        Equal Access to Services     : Chelsea Mar, denny roblero, qaamalia jj . [facility] [phone].    ATENCIÓN: Si habla español, tiene a [place] disposición servicios gratuitos de asistencia lingüística. Llame al [phone].    We comply with applicable federal civil rights laws and [place] " laws. We do not discriminate on the basis of race, color, national origin, age, disability, sex, sexual orientation, or gender identity.            Thank you!     Thank you for choosing [facility]  for your care. Our goal is always to provide you with excellent care. Hearing back from our patients is one way we can continue to improve our services. Please take a few minutes to complete the written survey that you may receive in the mail after your visit with us. Thank you!             Your Updated Medication List - Protect others around you: Learn how to safely use, store and throw away your medicines at www.disposemymeds.org.          This list is accurate as of 2/8/18  4:45 PM.  Always use your most recent med list.                   Brand Name Dispense Instructions for use Diagnosis    FLOVENT  MCG/ACT Inhaler   Generic drug:  fluticasone      Inhale 2 puffs into the lungs 2 times daily        pantoprazole 40 MG EC tablet    PROTONIX    30 tablet    Take 1 tablet (40 mg) by mouth daily Take 30-60 minutes before a meal.    Eosinophilic esophagitis       ZYRTEC PO      Take  by mouth.

## 2018-02-08 NOTE — PROGRESS NOTES
Chief Complaint:      Yearly physical exam     HPI:   Patient Satnam Gonzales is a very pleasant 37 year old male with history of Eosinophilic esophagitis who presents to Internal Medicine clinic today for a yearly physical exam. Regarding the patient's eosinophilic esophagitis symptoms, the patient's symptoms are currently well controlled with Protonix. He is also due for a refill of his Protonix medication. He is also currently followed by the outpatient GI specialist clinic. The patient denies any chest pain, headaches, fever or chills.       Current Medications:     Current Outpatient Prescriptions   Medication Sig Dispense Refill     fluticasone (FLOVENT HFA) 110 MCG/ACT Inhaler Inhale 2 puffs into the lungs 2 times daily       pantoprazole (PROTONIX) 40 MG EC tablet Take 1 tablet (40 mg) by mouth daily Take 30-60 minutes before a meal. 30 tablet 11     Cetirizine HCl (ZYRTEC PO) Take  by mouth.           Allergies:      Allergies   Allergen Reactions     Seasonal Allergies             Past Medical History:     Past Medical History:   Diagnosis Date     Depression      Eosinophilic esophagitis          Past Surgical History:     Past Surgical History:   Procedure Laterality Date     NO HISTORY OF SURGERY           Family Medical History:     Family History   Problem Relation Age of Onset     C.A.D. Paternal Grandfather 50     Alzheimer Disease Maternal Grandfather 73     Food Allergy Father      Seasonal/Environmental Allergies Brother          Social History:     Social History     Social History     Marital status:      Spouse name: N/A     Number of children: N/A     Years of education: N/A     Occupational History     Not on file.     Social History Main Topics     Smoking status: Former Smoker     Packs/day: 1.00     Years: 10.00     Smokeless tobacco: Current User     Types: Chew     Alcohol use No     Drug use: No     Sexual activity: Yes     Partners: Female     Other Topics Concern     Not on  "file     Social History Narrative           Review of System:     Constitutional: Negative for fever or chills  Skin: Negative for rashes  Ears/Nose/Throat: Negative for nasal congestion, sore throat  Respiratory: No shortness of breath, dyspnea on exertion, cough, or hemoptysis  Cardiovascular: Negative for chest pain  Gastrointestinal: Negative for nausea, vomiting. Positive for chronic Eosinophilic esophagitis  Genitourinary: Negative for dysuria, hematuria  Musculoskeletal: Negative for myalgias  Neurologic: Negative for headaches  Psychiatric: Negative for depression, anxiety  Hematologic/Lymphatic/Immunologic: Negative  Endocrine: Negative  Behavioral: Negative for tobacco use       Physical Exam:   /68 (BP Location: Left arm, Cuff Size: Adult Large)  Pulse 61  Temp 97.8  F (36.6  C) (Oral)  Ht 5' 8.5\" (1.74 m)  Wt 181 lb 12.8 oz (82.5 kg)  SpO2 95%  BMI 27.24 kg/m2    GENERAL: alert and no distress  EYES: eyes grossly normal to inspection, and conjunctivae and sclerae normal  HENT: Normocephalic atraumatic. Nose and mouth without ulcers or lesions  NECK: supple  RESP: lungs clear to auscultation   CV: regular rate and rhythm, normal S1 S2  LYMPH: no peripheral edema   ABDOMEN: nondistended  MS: no gross musculoskeletal defects noted  SKIN: no suspicious lesions or rashes  NEURO: Alert & Oriented x 3.   PSYCH: mentation appears normal, affect normal        Diagnostic Test Results:     Labs ordered today include CBC, BMP, lipid panel, Hgb A1c.    ASSESSMENT/PLAN:     (Z00.00) Routine history and physical examination of adult  (primary encounter diagnosis)  Comment: Yearly physical exam today  Plan: I have ordered labs for Lipid panel reflex to direct LDL Fasting, CBC with platelets FUTURE anytime, Basic metabolic panel FUTURE anytime    (Z13.1) Screening for diabetes mellitus  Comment: patient is due for diabetes screening  Plan: I have ordered lab for A1C FUTURE anytime    (Z13.220) Lipid " screening  Comment: Patient is due for lipid screening.  Plan:  I have ordered labs for Lipid panel reflex to direct LDL Fasting, *    (K20.0) Eosinophilic esophagitis  Comment: Patient's Eosinophilic esophagitis symptoms are currently well controlled.  Plan: Patient is advised to continue to follow up with his GI specialsit clinic going forward. I have also refilled the patient's Protonix medication today.        Follow Up Plan:     Patient is instructed to return to Internal Medicine clinic for follow-up visit in 1 year.        Chel Beaulieu MD  Internal Medicine  [facility]

## 2018-10-15 ENCOUNTER — OFFICE VISIT (OUTPATIENT)
Dept: FAMILY MEDICINE | Facility: CLINIC | Age: 38
End: 2018-10-15
Payer: COMMERCIAL

## 2018-10-15 VITALS
WEIGHT: 182 LBS | DIASTOLIC BLOOD PRESSURE: 80 MMHG | OXYGEN SATURATION: 98 % | SYSTOLIC BLOOD PRESSURE: 118 MMHG | TEMPERATURE: 96.9 F | HEIGHT: 69 IN | BODY MASS INDEX: 26.96 KG/M2 | HEART RATE: 57 BPM

## 2018-10-15 DIAGNOSIS — R79.89 ELEVATED LFTS: Primary | ICD-10-CM

## 2018-10-15 DIAGNOSIS — Z11.59 NEED FOR HEPATITIS B SCREENING TEST: ICD-10-CM

## 2018-10-15 DIAGNOSIS — Z11.59 NEED FOR HEPATITIS C SCREENING TEST: ICD-10-CM

## 2018-10-15 PROCEDURE — 36415 COLL VENOUS BLD VENIPUNCTURE: CPT | Performed by: INTERNAL MEDICINE

## 2018-10-15 PROCEDURE — 80076 HEPATIC FUNCTION PANEL: CPT | Performed by: INTERNAL MEDICINE

## 2018-10-15 PROCEDURE — 86708 HEPATITIS A ANTIBODY: CPT | Performed by: INTERNAL MEDICINE

## 2018-10-15 PROCEDURE — 86709 HEPATITIS A IGM ANTIBODY: CPT | Performed by: INTERNAL MEDICINE

## 2018-10-15 PROCEDURE — 87340 HEPATITIS B SURFACE AG IA: CPT | Performed by: INTERNAL MEDICINE

## 2018-10-15 PROCEDURE — 99214 OFFICE O/P EST MOD 30 MIN: CPT | Performed by: INTERNAL MEDICINE

## 2018-10-15 PROCEDURE — 86803 HEPATITIS C AB TEST: CPT | Performed by: INTERNAL MEDICINE

## 2018-10-15 NOTE — MR AVS SNAPSHOT
After Visit Summary   10/15/2018    Satnam Gonzales    MRN: [id number]           Patient Information     Date Of Birth          1980        Visit Information        Provider Department      10/15/2018 4:20 PM Chel Beaulieu MD [facility]        Today's Diagnoses     Elevated LFTs    -  1    Need for hepatitis B screening test        Need for hepatitis C screening test           Follow-ups after your visit        Additional Services     GASTROENTEROLOGY ADULT REF CONSULT ONLY       Preferred Location: [facility] [phone]      Please be aware that coverage of these services is subject to the terms and limitations of your health insurance plan.  Call member services at your health plan with any benefit or coverage questions.  Any procedures must be performed at a [facility] facility OR coordinated by your clinic's referral office.    Please bring the following with you to your appointment:    (1) Any X-Rays, CTs or MRIs which have been performed.  Contact the facility where they were done to arrange for  prior to your scheduled appointment.    (2) List of current medications   (3) This referral request   (4) Any documents/labs given to you for this referral                  Who to contact     If you have questions or need follow up information about today's clinic visit or your schedule please contact [facility] directly at [phone].  Normal or non-critical lab and imaging results will be communicated to you by MyChart, letter or phone within 4 business days after the clinic has received the results. If you do not hear from us within 7 days, please contact the clinic through MyChart or phone. If you have a critical or abnormal lab result, we will notify you by phone as soon as possible.  Submit refill requests through [employer] or call your pharmacy and they will forward the refill request to us. Please allow 3 business days for your refill to be  "completed.          Additional Information About Your Visit        [employer] Information     [employer] lets you send messages to your doctor, view your test results, renew your prescriptions, schedule appointments and more. To sign up, go to [email or web address] . Click on \"Log in\" on the left side of the screen, which will take you to the Welcome page. Then click on \"Sign up Now\" on the right side of the page.     You will be asked to enter the access code listed below, as well as some personal information. Please follow the directions to create your username and password.     Your access code is: [email or web address]  Expires: 2019  4:17 PM     Your access code will  in 90 days. If you need help or a new code, please call your [place] clinic or [phone].        Care EveryWhere ID     This is your Care EveryWhere ID. This could be used by other organizations to access your [place] medical records  [id number]        Your Vitals Were     Pulse Temperature Height Pulse Oximetry BMI (Body Mass Index)       57 96.9  F (36.1  C) (Oral) 5' 8.5\" (1.74 m) 98% 27.27 kg/m2        Blood Pressure from Last 3 Encounters:   10/15/18 118/80   18 105/68   17 108/70    Weight from Last 3 Encounters:   10/15/18 182 lb (82.6 kg)   18 181 lb 12.8 oz (82.5 kg)   17 178 lb (80.7 kg)              We Performed the Following     GASTROENTEROLOGY ADULT REF CONSULT ONLY     Hepatic panel (Albumin, ALT, AST, Bili, Alk Phos, TP)     Hepatitis B surface antigen     Hepatitis C antibody        Primary Care Provider Office Phone # Fax #    Chel Jose Beaulieu [phone] [address] [facility]  [place] [address]        Equal Access to Services     [facility] : Chelsea Mar, waaxda luqadaha, qaybta kaalmakalpesh foss, amalia barroso . [facility] [phone].    ATENCIÓN: Si habla español, tiene a [place] disposición servicios gratuitos de asistencia " lingüística. Rickey al [phone].    We comply with applicable federal civil rights laws and [place] laws. We do not discriminate on the basis of race, color, national origin, age, disability, sex, sexual orientation, or gender identity.            Thank you!     Thank you for choosing [facility]  for your care. Our goal is always to provide you with excellent care. Hearing back from our patients is one way we can continue to improve our services. Please take a few minutes to complete the written survey that you may receive in the mail after your visit with us. Thank you!             Your Updated Medication List - Protect others around you: Learn how to safely use, store and throw away your medicines at www.disposemymeds.org.          This list is accurate as of 10/15/18  4:40 PM.  Always use your most recent med list.                   Brand Name Dispense Instructions for use Diagnosis    pantoprazole 40 MG EC tablet    PROTONIX    30 tablet    Take 1 tablet (40 mg) by mouth daily Take 30-60 minutes before a meal.    Eosinophilic esophagitis       ZYRTEC PO      Take  by mouth.

## 2018-10-15 NOTE — PROGRESS NOTES
SUBJECTIVE:   Satnam Gonzales is a 38 year old male who presents to clinic today for the following health issues:    Elevated LFT's    Patient presents to clinic to follow up on some lab results he had done in reference to life insurance, which shows positive elevated LFT's of unclear etiology. He denies any recent alcohol use. He does not take any OTC tylenol medication. He has no known history of liver disease or family history of gallstones or liver disease. He denies any jaundice, denies any known history of viral hepatitis. He has a couple of tattoos from about 20 years ago.      Current Medications:     Current Outpatient Prescriptions   Medication Sig Dispense Refill     Cetirizine HCl (ZYRTEC PO) Take  by mouth.       pantoprazole (PROTONIX) 40 MG EC tablet Take 1 tablet (40 mg) by mouth daily Take 30-60 minutes before a meal. 30 tablet 11         Allergies:      Allergies   Allergen Reactions     Seasonal Allergies             Past Medical History:     Past Medical History:   Diagnosis Date     Depression      Eosinophilic esophagitis          Past Surgical History:     Past Surgical History:   Procedure Laterality Date     NO HISTORY OF SURGERY           Family Medical History:     Family History   Problem Relation Age of Onset     C.A.D. Paternal Grandfather 50     Alzheimer Disease Maternal Grandfather 73     Food Allergy Father      Seasonal/Environmental Allergies Brother          Social History:     Social History     Social History     Marital status:      Spouse name: N/A     Number of children: N/A     Years of education: N/A     Occupational History     Not on file.     Social History Main Topics     Smoking status: Former Smoker     Packs/day: 1.00     Years: 10.00     Smokeless tobacco: Current User     Types: Chew     Alcohol use No     Drug use: No     Sexual activity: Yes     Partners: Female     Other Topics Concern     Not on file     Social History Narrative           Review of  "System:     Constitutional: Negative for fever or chills  Skin: Negative for rashes  Ears/Nose/Throat: Negative for nasal congestion, sore throat  Respiratory: No shortness of breath, dyspnea on exertion, cough, or hemoptysis  Cardiovascular: Negative for chest pain  Gastrointestinal: Negative for nausea, vomiting, positive for recent elevated LFT's of unclear etiology  Genitourinary: Negative for dysuria, hematuria  Musculoskeletal: Negative for myalgias  Neurologic: Negative for headaches  Psychiatric: Negative for depression, anxiety  Hematologic/Lymphatic/Immunologic: Negative  Endocrine: Negative  Behavioral: Negative for tobacco use       Physical Exam:   /80 (BP Location: Left arm, Patient Position: Sitting, Cuff Size: Adult Regular)  Pulse 57  Temp 96.9  F (36.1  C) (Oral)  Ht 5' 8.5\" (1.74 m)  Wt 182 lb (82.6 kg)  SpO2 98%  BMI 27.27 kg/m2    GENERAL: alert and no distress  EYES: eyes grossly normal to inspection, and conjunctivae and sclerae normal  HENT: Normocephalic atraumatic. Nose and mouth without ulcers or lesions  NECK: supple  RESP: lungs clear to auscultation   CV: regular rate and rhythm, normal S1 S2  LYMPH: no peripheral edema   ABDOMEN: nondistended  MS: no gross musculoskeletal defects noted  SKIN: no suspicious lesions or rashes  NEURO: Alert & Oriented x 3.   PSYCH: mentation appears normal, affect normal        Diagnostic Test Results:   Outside labs reviewed today obtained on 9/7/2018, which were part of a life insurance medical exam    Albumin 4.7  Alk. Phos 175  T. Bili 0.7        HIV negative    ASSESSMENT/PLAN:       (R94.5) Elevated LFTs  (primary encounter diagnosis)  (Z11.59) Need for hepatitis B screening test  (Z11.59) Need for hepatitis C screening test  Comment: recent diagnosis of elevated liver enzymes of unclear etiology  Plan: I have ordered GASTROENTEROLOGY ADULT REF CONSULT ONLY with the [facility] Hepatology[facility]GI clinic for further evaluation and " management going forward. In the meantime, I have also ordered a repeat Hepatic panel (Albumin, ALT, AST, Bili, Alk Phos, TP),  Hepatitis B surface antigen, Hepatitis C antibody for further evaluation.    Follow Up Plan:     Patient is instructed to return to Internal Medicine clinic for follow-up visit in 1 month.        Chel Beaulieu MD  Internal Medicine  [facility]

## 2018-10-16 LAB
ALBUMIN SERPL-MCNC: 4.4 G/DL (ref 3.4–5)
ALP SERPL-CCNC: 154 U/L (ref 40–150)
ALT SERPL W P-5'-P-CCNC: 477 U/L (ref 0–70)
AST SERPL W P-5'-P-CCNC: 169 U/L (ref 0–45)
BILIRUB DIRECT SERPL-MCNC: 0.2 MG/DL (ref 0–0.2)
BILIRUB SERPL-MCNC: 0.8 MG/DL (ref 0.2–1.3)
HAV IGG SER QL IA: NONREACTIVE
HAV IGM SERPL QL IA: NONREACTIVE
HBV SURFACE AG SERPL QL IA: NONREACTIVE
HCV AB SERPL QL IA: NONREACTIVE
PROT SERPL-MCNC: 7.8 G/DL (ref 6.8–8.8)

## 2018-10-17 ENCOUNTER — TELEPHONE (OUTPATIENT)
Dept: FAMILY MEDICINE | Facility: CLINIC | Age: 38
End: 2018-10-17

## 2018-10-17 NOTE — TELEPHONE ENCOUNTER
Reason for Call:  Request for results:    Name of test or procedure: Labs    Date of test of procedure: 10/15    Location of the test or procedure: [facility]    OK to leave the result message on voice mail or with a family member? YES    Phone number Patient can be reached at:  Home number on file [phone] (home)    Additional comments: anytime    Call taken on 10/17/2018 at 4:24 PM by Ayaka Montejo

## 2018-10-18 ENCOUNTER — OFFICE VISIT (OUTPATIENT)
Dept: GASTROENTEROLOGY | Facility: CLINIC | Age: 38
End: 2018-10-18
Attending: PHYSICIAN ASSISTANT
Payer: COMMERCIAL

## 2018-10-18 VITALS
BODY MASS INDEX: 26.98 KG/M2 | OXYGEN SATURATION: 99 % | DIASTOLIC BLOOD PRESSURE: 86 MMHG | SYSTOLIC BLOOD PRESSURE: 130 MMHG | TEMPERATURE: 97.9 F | HEART RATE: 72 BPM | HEIGHT: 69 IN | WEIGHT: 182.2 LBS

## 2018-10-18 DIAGNOSIS — R74.8 ELEVATED LIVER ENZYMES: ICD-10-CM

## 2018-10-18 DIAGNOSIS — Z23 NEED FOR INFLUENZA VACCINATION: Primary | ICD-10-CM

## 2018-10-18 PROCEDURE — G0463 HOSPITAL OUTPT CLINIC VISIT: HCPCS | Mod: 25,ZF

## 2018-10-18 PROCEDURE — 25000128 H RX IP 250 OP 636: Mod: ZF | Performed by: PHYSICIAN ASSISTANT

## 2018-10-18 PROCEDURE — G0008 ADMIN INFLUENZA VIRUS VAC: HCPCS | Mod: ZF

## 2018-10-18 PROCEDURE — 90686 IIV4 VACC NO PRSV 0.5 ML IM: CPT | Mod: ZF | Performed by: PHYSICIAN ASSISTANT

## 2018-10-18 RX ADMIN — INFLUENZA A VIRUS A/MICHIGAN/45/2015 X-275 (H1N1) ANTIGEN (FORMALDEHYDE INACTIVATED), INFLUENZA A VIRUS A/SINGAPORE/INFIMH-16-0019/2016 IVR-186 (H3N2) ANTIGEN (FORMALDEHYDE INACTIVATED), INFLUENZA B VIRUS B/PHUKET/3073/2013 ANTIGEN (FORMALDEHYDE INACTIVATED), AND INFLUENZA B VIRUS B/MARYLAND/15/2016 BX-69A ANTIGEN (FORMALDEHYDE INACTIVATED) 0.5 ML: 15; 15; 15; 15 INJECTION, SUSPENSION INTRAMUSCULAR at 07:56

## 2018-10-18 ASSESSMENT — PAIN SCALES - GENERAL: PAINLEVEL: NO PAIN (0)

## 2018-10-18 NOTE — NURSING NOTE
"Chief Complaint   Patient presents with     Consult     Elevated LFT's     /86  Pulse 72  Temp 97.9  F (36.6  C) (Oral)  Ht 1.74 m (5' 8.5\")  Wt 82.6 kg (182 lb 3.2 oz)  SpO2 99%  BMI 27.3 kg/m2  Shannan Blas    "

## 2018-10-18 NOTE — MR AVS SNAPSHOT
After Visit Summary   10/18/2018    Satnam Gonzales    MRN: [id number]           Patient Information     Date Of Birth          1980        Visit Information        Provider Department      10/18/2018 7:15 AM Nae Do PA-C [facility] Hepatology        Today's Diagnoses     Need for influenza vaccination    -  1    Elevated liver enzymes           Follow-ups after your visit        Follow-up notes from your care team     Return in about 6 months (around 4/18/2019).      Your next 10 appointments already scheduled     Oct 23, 2018  3:30 PM CDT   LAB with CS LAB   [facility]    [address] [address] [phone]           Please do not eat 10-12 hours before your appointment if you are coming in fasting for labs on lipids, cholesterol, or glucose (sugar). This does not apply to pregnant women. Water, hot tea and black coffee (with nothing added) are okay. Do not drink other fluids, diet soda or chew gum.            Oct 24, 2018  7:10 AM CDT   US ABDOMEN COMPLETE with SHUS1   [facility] Ultrasound ([facility])    [address] [address]   [phone]           How do I prepare for my exam? (Food and drink instructions) Adults: No eating, smoking, gum chewing or drinking for 8 hours before the exam. You may take medicine with a small sip of water.  Children: * Infants, breast-fed: may have breast milk up to 2 hours before exam. * Infants, formula: may have bottle until 4 hours before exam. * Children 1-5 years: No food or drink for 4 hours before exam. * Children 6 -12 years: No food or drink for 6 hours before exam. * Children over 12 years: No food or drink for 8 hours before exam.  * J Tube Fed: No need to stop feedings.  What should I wear: Wear comfortable clothes.  How long does the exam take: Most ultrasounds take 30 to 60 minutes.  What should I bring: Bring a list of  your medicines, including vitamins, minerals and over-the-counter drugs. It is safest to leave personal items at home.  Do I need a :  No  is needed.  What do I need to tell my doctor: Tell your doctor about any allergies you may have.  What should I do after the exam: No restrictions, You may resume normal activities.  What is this test: An ultrasound uses sound waves to make pictures of the body. Sound waves do not cause pain. The only discomfort may be the pressure of the wand against your skin or full bladder.  Who should I call with questions: If you have any questions, please call the Imaging Department where you will have your exam. Directions, parking instructions, and other information is available on our website, [email or web address].              Future tests that were ordered for you today     Open Future Orders        Priority Expected Expires Ordered    Tissue transglutaminase yumiko IgA and IgG Routine  11/18/2018 10/19/2018    ANCA IgG by IFA with Reflex to Titer Routine  11/18/2018 10/19/2018    Anti Nuclear Yumiko IgG by IFA with Reflex Routine 10/18/2018 10/18/2019 10/18/2018    F Actin EIA with reflex Routine 10/18/2018 10/18/2019 10/18/2018    Hepatitis B core antibody Routine  11/17/2018 10/18/2018    Hepatitis B core antibody IgM Routine  11/17/2018 10/18/2018    Hepatitis B Surface Antibody Routine  11/17/2018 10/18/2018    Hepatic panel Routine  11/17/2018 10/18/2018    INR Routine  11/17/2018 10/18/2018    CBC with platelets Routine  11/17/2018 10/18/2018    US abdomen complete Routine  10/18/2019 10/18/2018            Who to contact     If you have questions or need follow up information about today's clinic visit or your schedule please contact [facility] HEPATOLOG[facility] directly at [phone].  Normal or non-critical lab and imaging results will be communicated to you by MyChart, letter or phone within 4 business days after the clinic has received the results. If you do not hear from us  "within 7 days, please contact the clinic through [employer] or phone. If you have a critical or abnormal lab result, we will notify you by phone as soon as possible.  Submit refill requests through [employer] or call your pharmacy and they will forward the refill request to us. Please allow 3 business days for your refill to be completed.          Additional Information About Your Visit        [employer] Information     [employer] gives you secure access to your electronic health record. If you see a primary care provider, you can also send messages to your care team and make appointments. If you have questions, please call your primary care clinic.  If you do not have a primary care provider, please call [phone] and they will assist you.        Care EveryWhere ID     This is your Care EveryWhere ID. This could be used by other organizations to access your [facility] medical records  [id number]        Your Vitals Were     Pulse Temperature Height Pulse Oximetry BMI (Body Mass Index)       72 97.9  F (36.6  C) (Oral) 1.74 m (5' 8.5\") 99% 27.3 kg/m2        Blood Pressure from Last 3 Encounters:   10/18/18 130/86   10/15/18 118/80   02/08/18 105/68    Weight from Last 3 Encounters:   10/18/18 82.6 kg (182 lb 3.2 oz)   10/15/18 82.6 kg (182 lb)   02/08/18 82.5 kg (181 lb 12.8 oz)               Primary Care Provider Office Phone # Fax #    Cehl Jose Beaulieu [phone] [address] [facility] [address] [address]        Equal Access to Services     [facility]: Hadii aad ku hadasho Soomaali, waaxda luqadaha, qaybta kaalmaamalia plascencia . So [facility] [phone].    ATENCIÓN: Si habla español, tiene a rowland disposición servicios gratuitos de asistencia lingüística. Llame al [phone].    We comply with applicable federal civil rights laws and [place] laws. We do not discriminate on the basis of race, color, national origin, age, disability, sex, sexual orientation, or " gender identity.            Thank you!     Thank you for choosing [facility] HEPATOLOGY  for your care. Our goal is always to provide you with excellent care. Hearing back from our patients is one way we can continue to improve our services. Please take a few minutes to complete the written survey that you may receive in the mail after your visit with us. Thank you!             Your Updated Medication List - Protect others around you: Learn how to safely use, store and throw away your medicines at www.disposemymeds.org.          This list is accurate as of 10/18/18 11:59 PM.  Always use your most recent med list.                   Brand Name Dispense Instructions for use Diagnosis    pantoprazole 40 MG EC tablet    PROTONIX    30 tablet    Take 1 tablet (40 mg) by mouth daily Take 30-60 minutes before a meal.    Eosinophilic esophagitis       ZYRTEC PO      Take  by mouth.

## 2018-10-18 NOTE — PROGRESS NOTES
Hepatology Clinic note  Satnam Gonzales   Date of Birth 1980  Date of Service 10/18/2018    REASON FOR CONSULTATION: Elevated LFT's  REFERRING PROVIDER: Jose Beaulieu MD          Assessment/plan:   Satnam Gonzales is a 38 year old male with history of elevated LFT's with a mixed pattern of unknown etiology after recent life insurance labs noting abnormality. He is otherwise asymptomatic. He has had mild improvement in transaminases over the past week, currently ,  and . No recent illness. His only risk factor for fatty liver disease is mildy overweight. No recent alcohol intake. Hep A and C serologies are negative. We discussed improving nutrition and getting back into regular exercise. No recent imaging to evaluate for obstruction or hepatobiliary dilatation.     - Abdominal ultrasound to evaluate anatomy  - Repeat hepatic panel CBC and INR next week  - Complete Hepatitis B serologies  - Check SILVANO, f-actin, TTG   - Follow-up in 6 months    Nae Do PA-C   [facility] Hepatology     -----------------------------------------------------       HPI:   Satnam Gonzales is a 38 year old male  presenting for the evaluation of elevated LFT's.     LFT's were first noticed to be abnormal in after he received labs for life insurance policy on September 27, 2018 at which time he had an alk phos of 175,  and AST of 202.  He denies any previous problems with liver disease or elevated liver enzymes in the past. On October 15, 2018 patient's alk phos was 154,  and .    He notes no recent illnesses.  He states he has had a normal energy level.  No abdominal pain, diarrhea or changes in bowel habits.  He denies any changes in his diet or herbal medications.  He denies any new medications.  He states his weight may be up about 5 pounds in the past year.  Long-term weight trends vary about 10 pounds.     Patient denies jaundice, lower extremity edema, abdominal distension or  confusion.  Patient also denies melena, hematochezia or hematemesis. Patient denies weight loss, fevers, sweats or chills.    PMH: Eosinophilic esophagitis, esophageal dysphasia, depression    PSH: s/P EGD in August 2017 that showed findings consistent with eosinophilic esophagitis, gastropathy    He quit smoking cigarettes.  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  He denies any history of any intravenous or intranasal drug use.  He denies any blood transfusions.  He has 2 tattoos were done about 20 years ago.  He currently works as a  in high school.  He lives with his wife and 9-year-old and 5-year-old boys.  No family history of liver disease or liver cancer. Family history of eosinophilic esophagitis.     Previous lab workup:  Hepatitis A IgM antibody nonreactive  Hepatitis A antibody nonreactive  Hepatitis B surface antigen nonreactive  Hepatitis C antibody nonreactive  Cholesterol: low HDL, otherwise normal     Medical hx Surgical hx   Past Medical History:   Diagnosis Date     Depression      Eosinophilic esophagitis     Past Surgical History:   Procedure Laterality Date     NO HISTORY OF SURGERY                 Medications:     Current Outpatient Prescriptions   Medication     pantoprazole (PROTONIX) 40 MG EC tablet     Cetirizine HCl (ZYRTEC PO)     No current facility-administered medications for this visit.             Allergies:     Allergies   Allergen Reactions     Seasonal Allergies             Social History:     Social History     Social History     Marital status:      Spouse name: N/A     Number of children: N/A     Years of education: N/A     Occupational History     Not on file.     Social History Main Topics     Smoking status: Former Smoker     Packs/day: 1.00     Years: 10.00     Smokeless tobacco: Former User     Types: Chew     Alcohol use No     Drug use: No     Sexual activity:  "Yes     Partners: Female     Other Topics Concern     Not on file     Social History Narrative            Family History:     Family History   Problem Relation Age of Onset     C.A.D. Paternal Grandfather 50     Alzheimer Disease Maternal Grandfather 73     Food Allergy Father      Seasonal/Environmental Allergies Brother           Review of Systems:   Gen: See HPI     HEENT: No change in vision or hearing, mouth sores, dysphagia, lymph nodes  Resp: No shortness of breath, coughing, hx of asthma  CV: No chest pain, palpitations, syncope   GI: See HPI  : No dysuria, history of stones, urine color    Skin: No rash; no pruritus or psoriasis  MS: No arthralgias, myalgias, joint swelling  Neuro: No memory changes, confusion, numbness    Heme: No difficulty clotting, bruising, bleeding  Psych:  No anxiety, depression, agitation          Physical Exam:   VS:  /86  Pulse 72  Temp 97.9  F (36.6  C) (Oral)  Ht 1.74 m (5' 8.5\")  Wt 82.6 kg (182 lb 3.2 oz)  SpO2 99%  BMI 27.3 kg/m2    Gen: A&Ox3, NAD, well developed  HEENT: non-icteric  CV: RRR, no overt murmurs  Lung: CTA Bilatererally, no wheezing or crackles.   Lym- no palpable lymphadenopathy  Abd: soft, NT, ND, no palpable splenomegaly, liver is not palpable.  Ext: no edema, intact pulses.   Skin: No rash, no palmar erythema, telangiectasias or jaundice  Neuro: grossly intact, no asterixis   Psych: appropriate mood and affects         Data:   Reviewed in person and significant for:    No results found for: NA   No results found for: POTASSIUM  No results found for: CHLORIDE  No results found for: CO2  No results found for: BUN  No results found for: CR    No results found for: WBC  No results found for: HGB  No results found for: HCT  No results found for: MCV  No results found for: PLT    Lab Results   Component Value Date     10/15/2018     Lab Results   Component Value Date     10/15/2018     No results found for: BILICONJ   Lab Results "   Component Value Date    BILITOTAL 0.8 10/15/2018       Lab Results   Component Value Date    ALBUMIN 4.4 10/15/2018     Lab Results   Component Value Date    PROTTOTAL 7.8 10/15/2018      Lab Results   Component Value Date    ALKPHOS 154 10/15/2018       No results found for: INR

## 2018-10-18 NOTE — NURSING NOTE
"Injectable Influenza Immunization Documentation    1.  Has the patient received the information for the injectable influenza vaccine? YES     2. Is the patient 6 months of age or older? YES     3. Does the patient have any of the following contraindications?         Severe allergy to eggs? No     Severe allergic reaction to previous influenza vaccines? No   Severe allergy to latex? No       History of Guillain-Whitewood syndrome? No     Currently have a temperature greater than 100.4F? No        4.  Severely egg allergic patients should have flu vaccine eligibility assessed by an MD, RN, or pharmacist, and those who received flu vaccine should be observed for 15 min by an MD, RN, Pharmacist, Medical Technician, or member of clinic staff.\": YES    5. Latex-allergic patients should be given latex-free influenza vaccine Yes. Please reference the Vaccine latex table to determine if your clinic s product is latex-containing.       Vaccination given by Haydee Yadav MA      "

## 2018-10-18 NOTE — LETTER
10/18/2018      RE: Satnam Gonzales  [address]  [facility] [address]       Hepatology Clinic note  Satnam Gonzales   Date of Birth 1980  Date of Service 10/18/2018    REASON FOR CONSULTATION: Elevated LFT's  REFERRING PROVIDER: Jose Beaulieu MD          Assessment/plan:   Satnam Gonzales is a 38 year old male with history of elevated LFT's with a mixed pattern of unknown etiology after recent life insurance labs noting abnormality. He is otherwise asymptomatic. He has had mild improvement in transaminases over the past week, currently ,  and . No recent illness. His only risk factor for fatty liver disease is mildy overweight. No recent alcohol intake. Hep A and C serologies are negative. We discussed improving nutrition and getting back into regular exercise. No recent imaging to evaluate for obstruction or hepatobiliary dilatation.     - Abdominal ultrasound to evaluate anatomy  - Repeat hepatic panel CBC and INR next week  - Complete Hepatitis B serologies  - Check SILVANO, f-actin, TTG   - Follow-up in 6 months    Nae Do PA-C   [facility] Hepatology     -----------------------------------------------------       HPI:   Satnam Gonzales is a 38 year old male  presenting for the evaluation of elevated LFT's.     LFT's were first noticed to be abnormal in after he received labs for life insurance policy on September 27, 2018 at which time he had an alk phos of 175,  and AST of 202.  He denies any previous problems with liver disease or elevated liver enzymes in the past. On October 15, 2018 patient's alk phos was 154,  and .    He notes no recent illnesses.  He states he has had a normal energy level.  No abdominal pain, diarrhea or changes in bowel habits.  He denies any changes in his diet or herbal medications.  He denies any new medications.  He states his weight may be up about 5 pounds in the past year.  Long-term weight trends vary about 10 pounds.      Patient denies jaundice, lower extremity edema, abdominal distension or confusion.  Patient also denies melena, hematochezia or hematemesis. Patient denies weight loss, fevers, sweats or chills.    PMH: Eosinophilic esophagitis, esophageal dysphasia, depression    PSH: s/P EGD in August 2017 that showed findings consistent with eosinophilic esophagitis, gastropathy    He quit smoking cigarettes.  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  He denies any history of any intravenous or intranasal drug use.  He denies any blood transfusions.  He has 2 tattoos were done about 20 years ago.  He currently works as a  in high school.  He lives with his wife and 9-year-old and 5-year-old boys.  No family history of liver disease or liver cancer. Family history of eosinophilic esophagitis.     Previous lab workup:  Hepatitis A IgM antibody nonreactive  Hepatitis A antibody nonreactive  Hepatitis B surface antigen nonreactive  Hepatitis C antibody nonreactive  Cholesterol: low HDL, otherwise normal     Medical hx Surgical hx   Past Medical History:   Diagnosis Date     Depression      Eosinophilic esophagitis     Past Surgical History:   Procedure Laterality Date     NO HISTORY OF SURGERY                 Medications:     Current Outpatient Prescriptions   Medication     pantoprazole (PROTONIX) 40 MG EC tablet     Cetirizine HCl (ZYRTEC PO)     No current facility-administered medications for this visit.             Allergies:     Allergies   Allergen Reactions     Seasonal Allergies             Social History:     Social History     Social History     Marital status:      Spouse name: N/A     Number of children: N/A     Years of education: N/A     Occupational History     Not on file.     Social History Main Topics     Smoking status: Former Smoker     Packs/day: 1.00     Years: 10.00     Smokeless tobacco: Former  "User     Types: Chew     Alcohol use No     Drug use: No     Sexual activity: Yes     Partners: Female     Other Topics Concern     Not on file     Social History Narrative            Family History:     Family History   Problem Relation Age of Onset     C.A.D. Paternal Grandfather 50     Alzheimer Disease Maternal Grandfather 73     Food Allergy Father      Seasonal/Environmental Allergies Brother           Review of Systems:   Gen: See HPI     HEENT: No change in vision or hearing, mouth sores, dysphagia, lymph nodes  Resp: No shortness of breath, coughing, hx of asthma  CV: No chest pain, palpitations, syncope   GI: See HPI  : No dysuria, history of stones, urine color    Skin: No rash; no pruritus or psoriasis  MS: No arthralgias, myalgias, joint swelling  Neuro: No memory changes, confusion, numbness    Heme: No difficulty clotting, bruising, bleeding  Psych:  No anxiety, depression, agitation          Physical Exam:   VS:  /86  Pulse 72  Temp 97.9  F (36.6  C) (Oral)  Ht 1.74 m (5' 8.5\")  Wt 82.6 kg (182 lb 3.2 oz)  SpO2 99%  BMI 27.3 kg/m2    Gen: A&Ox3, NAD, well developed  HEENT: non-icteric  CV: RRR, no overt murmurs  Lung: CTA Bilatererally, no wheezing or crackles.   Lym- no palpable lymphadenopathy  Abd: soft, NT, ND, no palpable splenomegaly, liver is not palpable.  Ext: no edema, intact pulses.   Skin: No rash, no palmar erythema, telangiectasias or jaundice  Neuro: grossly intact, no asterixis   Psych: appropriate mood and affects         Data:   Reviewed in person and significant for:    No results found for: NA   No results found for: POTASSIUM  No results found for: CHLORIDE  No results found for: CO2  No results found for: BUN  No results found for: CR    No results found for: WBC  No results found for: HGB  No results found for: HCT  No results found for: MCV  No results found for: PLT    Lab Results   Component Value Date     10/15/2018     Lab Results   Component Value " Date     10/15/2018     No results found for: BILICONJ   Lab Results   Component Value Date    BILITOTAL 0.8 10/15/2018       Lab Results   Component Value Date    ALBUMIN 4.4 10/15/2018     Lab Results   Component Value Date    PROTTOTAL 7.8 10/15/2018      Lab Results   Component Value Date    ALKPHOS 154 10/15/2018       No results found for: INR        Nae Do PA-C

## 2018-10-23 DIAGNOSIS — R74.8 ELEVATED LIVER ENZYMES: ICD-10-CM

## 2018-10-23 DIAGNOSIS — Z00.00 ROUTINE HISTORY AND PHYSICAL EXAMINATION OF ADULT: ICD-10-CM

## 2018-10-23 DIAGNOSIS — Z23 NEED FOR INFLUENZA VACCINATION: ICD-10-CM

## 2018-10-23 DIAGNOSIS — Z13.1 SCREENING FOR DIABETES MELLITUS: ICD-10-CM

## 2018-10-23 LAB
ERYTHROCYTE [DISTWIDTH] IN BLOOD BY AUTOMATED COUNT: 12.4 % (ref 10–15)
HBA1C MFR BLD: 5.2 % (ref 0–5.6)
HCT VFR BLD AUTO: 45.3 % (ref 40–53)
HGB BLD-MCNC: 15.5 G/DL (ref 13.3–17.7)
INR PPP: 1.1 (ref 0.86–1.14)
MCH RBC QN AUTO: 30.4 PG (ref 26.5–33)
MCHC RBC AUTO-ENTMCNC: 34.2 G/DL (ref 31.5–36.5)
MCV RBC AUTO: 89 FL (ref 78–100)
PLATELET # BLD AUTO: 186 10E9/L (ref 150–450)
RBC # BLD AUTO: 5.1 10E12/L (ref 4.4–5.9)
WBC # BLD AUTO: 6.2 10E9/L (ref 4–11)

## 2018-10-23 PROCEDURE — 83516 IMMUNOASSAY NONANTIBODY: CPT | Mod: 59 | Performed by: PHYSICIAN ASSISTANT

## 2018-10-23 PROCEDURE — 86704 HEP B CORE ANTIBODY TOTAL: CPT | Performed by: PHYSICIAN ASSISTANT

## 2018-10-23 PROCEDURE — 36415 COLL VENOUS BLD VENIPUNCTURE: CPT | Performed by: PHYSICIAN ASSISTANT

## 2018-10-23 PROCEDURE — 83516 IMMUNOASSAY NONANTIBODY: CPT | Mod: 90 | Performed by: PHYSICIAN ASSISTANT

## 2018-10-23 PROCEDURE — 86706 HEP B SURFACE ANTIBODY: CPT | Performed by: PHYSICIAN ASSISTANT

## 2018-10-23 PROCEDURE — 80061 LIPID PANEL: CPT | Performed by: PHYSICIAN ASSISTANT

## 2018-10-23 PROCEDURE — 86255 FLUORESCENT ANTIBODY SCREEN: CPT | Performed by: PHYSICIAN ASSISTANT

## 2018-10-23 PROCEDURE — 85610 PROTHROMBIN TIME: CPT | Performed by: PHYSICIAN ASSISTANT

## 2018-10-23 PROCEDURE — 85027 COMPLETE CBC AUTOMATED: CPT | Performed by: PHYSICIAN ASSISTANT

## 2018-10-23 PROCEDURE — 99000 SPECIMEN HANDLING OFFICE-LAB: CPT | Performed by: PHYSICIAN ASSISTANT

## 2018-10-23 PROCEDURE — 80048 BASIC METABOLIC PNL TOTAL CA: CPT | Performed by: PHYSICIAN ASSISTANT

## 2018-10-23 PROCEDURE — 80076 HEPATIC FUNCTION PANEL: CPT | Performed by: PHYSICIAN ASSISTANT

## 2018-10-23 PROCEDURE — 86705 HEP B CORE ANTIBODY IGM: CPT | Performed by: PHYSICIAN ASSISTANT

## 2018-10-23 PROCEDURE — 83036 HEMOGLOBIN GLYCOSYLATED A1C: CPT | Performed by: PHYSICIAN ASSISTANT

## 2018-10-23 PROCEDURE — 86038 ANTINUCLEAR ANTIBODIES: CPT | Performed by: PHYSICIAN ASSISTANT

## 2018-10-24 ENCOUNTER — HOSPITAL ENCOUNTER (OUTPATIENT)
Dept: ULTRASOUND IMAGING | Facility: CLINIC | Age: 38
Discharge: HOME | End: 2018-10-24
Attending: PHYSICIAN ASSISTANT | Admitting: PHYSICIAN ASSISTANT
Payer: COMMERCIAL

## 2018-10-24 DIAGNOSIS — R74.8 ELEVATED LIVER ENZYMES: ICD-10-CM

## 2018-10-24 DIAGNOSIS — Z23 NEED FOR INFLUENZA VACCINATION: ICD-10-CM

## 2018-10-24 LAB
ALBUMIN SERPL-MCNC: 4.3 G/DL (ref 3.4–5)
ALP SERPL-CCNC: 140 U/L (ref 40–150)
ALT SERPL W P-5'-P-CCNC: 370 U/L (ref 0–70)
ANA SER QL IF: NEGATIVE
ANION GAP SERPL CALCULATED.3IONS-SCNC: 5 MMOL/L (ref 3–14)
AST SERPL W P-5'-P-CCNC: 144 U/L (ref 0–45)
BILIRUB DIRECT SERPL-MCNC: 0.2 MG/DL (ref 0–0.2)
BILIRUB SERPL-MCNC: 0.9 MG/DL (ref 0.2–1.3)
BUN SERPL-MCNC: 11 MG/DL (ref 7–30)
CALCIUM SERPL-MCNC: 9.4 MG/DL (ref 8.5–10.1)
CHLORIDE SERPL-SCNC: 102 MMOL/L (ref 94–109)
CHOLEST SERPL-MCNC: 145 MG/DL
CO2 SERPL-SCNC: 32 MMOL/L (ref 20–32)
CREAT SERPL-MCNC: 0.95 MG/DL (ref 0.66–1.25)
GFR SERPL CREATININE-BSD FRML MDRD: 88 ML/MIN/1.7M2
GLUCOSE SERPL-MCNC: 85 MG/DL (ref 70–99)
HBV CORE AB SERPL QL IA: NONREACTIVE
HBV CORE IGM SERPL QL IA: NONREACTIVE
HBV SURFACE AB SERPL IA-ACNC: 125.01 M[IU]/ML
HDLC SERPL-MCNC: 39 MG/DL
LDLC SERPL CALC-MCNC: 73 MG/DL
NONHDLC SERPL-MCNC: 106 MG/DL
POTASSIUM SERPL-SCNC: 3.9 MMOL/L (ref 3.4–5.3)
PROT SERPL-MCNC: 7.7 G/DL (ref 6.8–8.8)
SODIUM SERPL-SCNC: 139 MMOL/L (ref 133–144)
TRIGL SERPL-MCNC: 164 MG/DL
TTG IGA SER-ACNC: <1 U/ML
TTG IGG SER-ACNC: <1 U/ML

## 2018-10-24 PROCEDURE — 76705 ECHO EXAM OF ABDOMEN: CPT

## 2018-10-25 ENCOUNTER — MYC MEDICAL ADVICE (OUTPATIENT)
Dept: GASTROENTEROLOGY | Facility: CLINIC | Age: 38
End: 2018-10-25

## 2018-10-25 DIAGNOSIS — R79.89 ELEVATED LFTS: Primary | ICD-10-CM

## 2018-10-25 LAB
ANCA AB PATTERN SER IF-IMP: NORMAL
C-ANCA TITR SER IF: NORMAL {TITER}
SMA IGG SER-ACNC: 56 UNITS (ref 0–19)

## 2018-10-26 LAB — SMOOTH MUSCLE ABY IGG TITER: NORMAL

## 2018-10-30 ENCOUNTER — TELEPHONE (OUTPATIENT)
Dept: INTERVENTIONAL RADIOLOGY/VASCULAR | Facility: CLINIC | Age: 38
End: 2018-10-30

## 2018-11-01 ENCOUNTER — APPOINTMENT (OUTPATIENT)
Dept: MEDSURG UNIT | Facility: CLINIC | Age: 38
End: 2018-11-01
Attending: RADIOLOGY
Payer: COMMERCIAL

## 2018-11-01 ENCOUNTER — HOSPITAL ENCOUNTER (OUTPATIENT)
Facility: CLINIC | Age: 38
Discharge: HOME | End: 2018-11-01
Attending: RADIOLOGY | Admitting: RADIOLOGY
Payer: COMMERCIAL

## 2018-11-01 ENCOUNTER — APPOINTMENT (OUTPATIENT)
Dept: INTERVENTIONAL RADIOLOGY/VASCULAR | Facility: CLINIC | Age: 38
End: 2018-11-01
Attending: PHYSICIAN ASSISTANT
Payer: COMMERCIAL

## 2018-11-01 VITALS
DIASTOLIC BLOOD PRESSURE: 65 MMHG | HEIGHT: 69 IN | HEART RATE: 68 BPM | SYSTOLIC BLOOD PRESSURE: 113 MMHG | BODY MASS INDEX: 26.66 KG/M2 | OXYGEN SATURATION: 99 % | TEMPERATURE: 97.5 F | RESPIRATION RATE: 16 BRPM | WEIGHT: 180 LBS

## 2018-11-01 DIAGNOSIS — R79.89 ELEVATED LFTS: ICD-10-CM

## 2018-11-01 PROCEDURE — 99153 MOD SED SAME PHYS/QHP EA: CPT

## 2018-11-01 PROCEDURE — 25000128 H RX IP 250 OP 636: Performed by: PHYSICIAN ASSISTANT

## 2018-11-01 PROCEDURE — 88307 TISSUE EXAM BY PATHOLOGIST: CPT | Performed by: PHYSICIAN ASSISTANT

## 2018-11-01 PROCEDURE — 25000125 ZZHC RX 250: Performed by: PHYSICIAN ASSISTANT

## 2018-11-01 PROCEDURE — 99152 MOD SED SAME PHYS/QHP 5/>YRS: CPT

## 2018-11-01 PROCEDURE — 25000132 ZZH RX MED GY IP 250 OP 250 PS 637: Performed by: PHYSICIAN ASSISTANT

## 2018-11-01 PROCEDURE — 88313 SPECIAL STAINS GROUP 2: CPT | Performed by: PHYSICIAN ASSISTANT

## 2018-11-01 PROCEDURE — 27210732 IR LIVER BIOPSY PERCUTANEOUS

## 2018-11-01 PROCEDURE — 40000168 ZZH STATISTIC PP CARE STAGE 3

## 2018-11-01 RX ORDER — IBUPROFEN 200 MG
600 TABLET ORAL ONCE
Status: COMPLETED | OUTPATIENT
Start: 2018-11-01 | End: 2018-11-01

## 2018-11-01 RX ORDER — LIDOCAINE 40 MG/G
CREAM TOPICAL
Status: DISCONTINUED | OUTPATIENT
Start: 2018-11-01 | End: 2018-11-01 | Stop reason: HOSPADM

## 2018-11-01 RX ORDER — FLUMAZENIL 0.1 MG/ML
0.2 INJECTION, SOLUTION INTRAVENOUS
Status: DISCONTINUED | OUTPATIENT
Start: 2018-11-01 | End: 2018-11-01 | Stop reason: HOSPADM

## 2018-11-01 RX ORDER — FENTANYL CITRATE 50 UG/ML
25-50 INJECTION, SOLUTION INTRAMUSCULAR; INTRAVENOUS EVERY 5 MIN PRN
Status: DISCONTINUED | OUTPATIENT
Start: 2018-11-01 | End: 2018-11-01 | Stop reason: HOSPADM

## 2018-11-01 RX ORDER — SODIUM CHLORIDE 9 MG/ML
INJECTION, SOLUTION INTRAVENOUS CONTINUOUS
Status: DISCONTINUED | OUTPATIENT
Start: 2018-11-01 | End: 2018-11-01 | Stop reason: HOSPADM

## 2018-11-01 RX ORDER — NALOXONE HYDROCHLORIDE 0.4 MG/ML
.1-.4 INJECTION, SOLUTION INTRAMUSCULAR; INTRAVENOUS; SUBCUTANEOUS
Status: DISCONTINUED | OUTPATIENT
Start: 2018-11-01 | End: 2018-11-01 | Stop reason: HOSPADM

## 2018-11-01 RX ORDER — MULTIPLE VITAMINS W/ MINERALS TAB 9MG-400MCG
1 TAB ORAL DAILY
COMMUNITY
End: 2022-04-20 | Stop reason: ALTCHOICE

## 2018-11-01 RX ADMIN — SODIUM CHLORIDE: 9 INJECTION, SOLUTION INTRAVENOUS at 12:18

## 2018-11-01 RX ADMIN — IBUPROFEN 600 MG: 200 TABLET, FILM COATED ORAL at 14:31

## 2018-11-01 RX ADMIN — FENTANYL CITRATE 100 MCG: 50 INJECTION, SOLUTION INTRAMUSCULAR; INTRAVENOUS at 12:58

## 2018-11-01 RX ADMIN — LIDOCAINE HYDROCHLORIDE 10 ML: 10 INJECTION, SOLUTION EPIDURAL; INFILTRATION; INTRACAUDAL; PERINEURAL at 13:18

## 2018-11-01 RX ADMIN — MIDAZOLAM 2 MG: 1 INJECTION INTRAMUSCULAR; INTRAVENOUS at 12:58

## 2018-11-01 NOTE — DISCHARGE INSTRUCTIONS
[facility]    Interventional Radiology  Patient Instructions Following  Liver  Biopsy    AFTER YOU GO HOME  ? If you were given sedation DO NOT drive or operate machinery at home or at work for at least 24 hours  ? DO relax and take it easy for 48 hours, no strenuous activity for 24 hours  ? DO drink plenty of fluids  ? DO resume your regular diet, unless otherwise instructed by your Primary Physician  ? Keep the dressing dry and in place for 24 hours.  ? DO NOT SMOKE FOR AT LEAST 24 HOURS, if you have been given any medications that were to help you relax or sedate you during your procedure  ? DO NOT drink alcoholic beverages the day of your procedure  ? DO NOT do any strenuous exercise or lifting (> 10 lbs) for at least 5 days following your procedure  ? DO NOT take  shower for at least 12 hours following your procedure  ? Remove dressing after shower the next day. Replace with Band aid for 5 days.  Never leave a wet dressing in place.  ? DO NOT make any important or legal decisions for 24 hours following your procedure  ? There should be minimum drainage from the biopsy site    CALL THE PHYSICIAN IF:  ? You start bleeding from the procedure site.  If you do start to bleed from that site, lie down flat and hold pressure on the site for a minimum of 10 minutes.  Your physician will tell you if you need to return to the hospital.  ? You develop nausea or vomiting.  ? You have excessive swelling, redness, or tenderness at the site.  ? You have drainage that looks like it is infected.  ? You experience severe pain.  ? You develop hives or a rash or unexplained itching.  ? You develop shortness of breath.  ? You develop a temperature of 101 degrees F or greater.      [facility] INTERVENTIONAL RADIOLOGY DEPARTMENT  Procedure Physician: Bertram Medina                                    Date of procedure: November 1, 2018 Thursday    Telephone Numbers: [phone] Monday-Friday 8:00 am to 4:30  pm    [phone] After 4:30 pm Monday-Friday, Weekends & Holidays.   Ask for the Interventional Radiologist on call.  Someone is on call 24 hrs/day    [facility] toll free number: [phone] Monday-Friday 8:00 am to 4:30 pm    [facility] Emergency Dept: [phone]

## 2018-11-01 NOTE — PROGRESS NOTES
Pt reports some pain at biopsy site, cold pack applied, pain continued, notified Bertram Medina, gave ibuprofen per order. Pt taking PO without problem. Wife at bedside.

## 2018-11-01 NOTE — PROGRESS NOTES
1215  Prep is complete for procedure.  Denies any pain.  Adams is here for Liver Biopsy.  Wife, Jackelin, is here as .  CTRN

## 2018-11-01 NOTE — PROGRESS NOTES
Patient Name: Satnam Gonzales  Medical Record Number: [id number]  Today's Date: 11/1/2018    Procedure: Liver Biopsy  Proceduralist: Bertram Medina PA-C    Sedation start time: 1230  Sedation end time: 1310  Sedation medications administered: 2 mg Versed, 100 mcg Fentanyl   Total sedation time: 40 min    Procedure start time: 1240  Puncture time: 1245  Procedure end time: 1310    Report given to: 2A RN    Other Notes: Pt arrived to IR room 7 from . Consent reviewed. Pt denies any questions or concerns regarding procedure. Pt positioned supine and monitored per protocol. Pt tolerated procedure without any noted complications. Pt transferred back to .

## 2018-11-01 NOTE — IP AVS SNAPSHOT
MRN:[id number]                      After Visit Summary   11/1/2018    Satnam Gonzales    MRN: [id number]           Visit Information        Department      11/1/2018 11:05 AM [address] [facility] [place]          Review of your medicines      UNREVIEWED medicines. Ask your doctor about these medicines        Dose / Directions    Multi-vitamin Tabs tablet        Dose:  1 tablet   Take 1 tablet by mouth daily   Refills:  0       pantoprazole 40 MG EC tablet   Commonly known as:  PROTONIX   Used for:  Eosinophilic esophagitis        Dose:  40 mg   Take 1 tablet (40 mg) by mouth daily Take 30-60 minutes before a meal.   Quantity:  30 tablet   Refills:  11       ZYRTEC PO        Take  by mouth.   Refills:  0                Protect others around you: Learn how to safely use, store and throw away your medicines at www.disposemymeds.org.         Follow-ups after your visit         Care Instructions        Further instructions from your care team       [facility]    Interventional Radiology  Patient Instructions Following  Liver  Biopsy    AFTER YOU GO HOME  ? If you were given sedation DO NOT drive or operate machinery at home or at work for at least 24 hours  ? DO relax and take it easy for 48 hours, no strenuous activity for 24 hours  ? DO drink plenty of fluids  ? DO resume your regular diet, unless otherwise instructed by your Primary Physician  ? Keep the dressing dry and in place for 24 hours.  ? DO NOT SMOKE FOR AT LEAST 24 HOURS, if you have been given any medications that were to help you relax or sedate you during your procedure  ? DO NOT drink alcoholic beverages the day of your procedure  ? DO NOT do any strenuous exercise or lifting (> 10 lbs) for at least 5 days following your procedure  ? DO NOT take  shower for at least 12 hours following your procedure  ? Remove dressing after shower the next day. Replace with Band aid for 5 days.  Never leave a wet dressing in place.  ? DO NOT  "make any important or legal decisions for 24 hours following your procedure  ? There should be minimum drainage from the biopsy site    CALL THE PHYSICIAN IF:  ? You start bleeding from the procedure site.  If you do start to bleed from that site, lie down flat and hold pressure on the site for a minimum of 10 minutes.  Your physician will tell you if you need to return to the hospital.  ? You develop nausea or vomiting.  ? You have excessive swelling, redness, or tenderness at the site.  ? You have drainage that looks like it is infected.  ? You experience severe pain.  ? You develop hives or a rash or unexplained itching.  ? You develop shortness of breath.  ? You develop a temperature of 101 degrees F or greater.      [facility] INTERVENTIONAL RADIOLOGY DEPARTMENT  Procedure Physician: Bertram Medina                                    Date of procedure: November 1, 2018 Thursday    Telephone Numbers: [phone] Monday-Friday 8:00 am to 4:30 pm    [phone] After 4:30 pm Monday-Friday, Weekends & Holidays.   Ask for the Interventional Radiologist on call.  Someone is on call 24 hrs/day    [facility] toll free number: [phone] Monday-Friday 8:00 am to 4:30 pm    [facility] Emergency Dept: [phone]           Additional Information About Your Visit        [employer] Information     [facility] gives you secure access to your electronic health record. If you see a primary care provider, you can also send messages to your care team and make appointments. If you have questions, please call your primary care clinic.  If you do not have a primary care provider, please call [phone] and they will assist you.        Care EveryWhere ID     This is your Care EveryWhere ID. This could be used by other organizations to access your [place] medical records  [id number]        Your Vitals Were     Blood Pressure Pulse Temperature Respirations Height Weight    111/64 (BP Location: Right arm) 68 97.5  F (36.4  C) (Oral) 16 1.753 m (5' 9\") " 81.6 kg (180 lb)    Pulse Oximetry BMI (Body Mass Index)                97% 26.58 kg/m2           Primary Care Provider Office Phone # Fax #    Chel Jose Beaulieu [phone]      Equal Access to Services     TIANNAPARDEEP [facility] CenterDEMETRI : Hadii aad ku hadelberto Soomaali, waaxda luqadaha, qaybta kaalmada adeegyada, waxabraham torey jesusn hermelindajuanpablo pina laparamjitcarlos . So [facility] [phone].    ATENCIÓN: Si habla español, tiene a [place] disposición servicios gratuitos de asistencia lingüística. Llame al [phone].    We comply with applicable federal civil rights laws and [place] laws. We do not discriminate on the basis of race, color, national origin, age, disability, sex, sexual orientation, or gender identity.            Thank you!     Thank you for choosing [facility] for your care. Our goal is always to provide you with excellent care. Hearing back from our patients is one way we can continue to improve our services. Please take a few minutes to complete the written survey that you may receive in the mail after you visit with us. Thank you!             Medication List: This is a list of all your medications and when to take them. Check marks below indicate your daily home schedule. Keep this list as a reference.      Medications           Morning Afternoon Evening Bedtime As Needed    Multi-vitamin Tabs tablet   Take 1 tablet by mouth daily                                pantoprazole 40 MG EC tablet   Commonly known as:  PROTONIX   Take 1 tablet (40 mg) by mouth daily Take 30-60 minutes before a meal.                                ZYRTEC PO   Take  by mouth.

## 2018-11-01 NOTE — PROCEDURES
Interventional Radiology Pre-Procedure Sedation Assessment   Time of Assessment: 12:06 PM    Expected Level: Moderate Sedation    Indication: Sedation is required for the following type of Procedure: Biopsy    Sedation and procedural consent: Risks, benefits and alternatives were discussed with Patient and Spouse    PO Intake: Appropriately NPO for procedure    ASA Class: Class 2 - MILD SYSTEMIC DISEASE, NO ACUTE PROBLEMS, NO FUNCTIONAL LIMITATIONS.    Mallampati: Grade 1:  Soft palate, uvula, tonsillar pillars, and posterior pharyngeal wall visible    Lungs: Lungs Clear with good breath sounds bilaterally    Heart: Normal heart sounds and rate    History and physical reviewed and no updates needed. I have reviewed the lab findings, diagnostic data, medications, and the plan for sedation. I have determined this patient to be an appropriate candidate for the planned sedation and procedure and have reassessed the patient IMMEDIATELY PRIOR to sedation and procedure.    Kevin Medina PA-C

## 2018-11-01 NOTE — PROGRESS NOTES
1530  States that his pain has decreased.  Drinking soda.  Denies any needs at this time.  Wife remains at bedside.  CTRN    1655  Up, ambulated in liu and to BR.  Tolerated activity well.  Stable on feet.  Voided without difficulty.    Discharge instructions reviewed with pt. Verbally demonstrates understanding of instructions.  1720  Discharged to care of wife per WC accompanied by staff.  CTRN

## 2018-11-01 NOTE — PROGRESS NOTES
Pt arrived to floor with RN from IR s/p Liver Biopsy. Right Abdominal dressing CDI, no hematoma. Pt denies pain. Tolerating regular diet.  Wife at bedside.

## 2018-11-01 NOTE — PROCEDURES
Interventional Radiology Brief Post Procedure Note    Procedure: IR LIVER BIOPSY PERCUTANEOUS    Proceduralist: Kevin Medina [facility], PARovertoC    Assistant: None    Time Out: Prior to the start of the procedure and with procedural staff participation, I verbally confirmed the patient s identity using two indicators, relevant allergies, that the procedure was appropriate and matched the consent or emergent situation, and that the correct equipment/implants were available. Immediately prior to starting the procedure I conducted the Time Out with the procedural staff and re-confirmed the patient s name, procedure, and site/side. (The Joint Commission universal protocol was followed.)  Yes    Medications   Medication Event Details Admin User Admin Time   midazolam (VERSED) injection 0.5-1 mg Medication Given Dose: 2 mg; Route: Intravenous Satnam Graff RN 11/1/2018 12:58 PM   fentaNYL (PF) (SUBLIMAZE) injection 25-50 mcg Medication Given Dose: 100 mcg; Route: Intravenous; Comment: scanner not working Satnam Graff RN 11/1/2018 12:58 PM   lidocaine 1 % 1-30 mL Medication Given Dose: 10 mL; Route: Intradermal Satnam Graff RN 11/1/2018  1:18 PM       Sedation: IR Nurse Monitored Care   Post Procedure Summary:  Prior to the start of the procedure and with procedural staff participation, I verbally confirmed the patient s identity using two indicators, relevant allergies, that the procedure was appropriate and matched the consent or emergent situation, and that the correct equipment/implants were available. Immediately prior to starting the procedure I conducted the Time Out with the procedural staff and re-confirmed the patient s name, procedure, and site/side. (The Joint Commission universal protocol was followed.)  Yes       Sedatives: Fentanyl and Midazolam (Versed)    Vital signs, airway and pulse oximetry were monitored and remained stable throughout the procedure and sedation was maintained until the procedure was  complete.  The patient was monitored by staff until sedation discharge criteria were met.    Patient tolerance: Patient tolerated the procedure well with no immediate complications.    Time of sedation in minutes: 40 minutes from beginning to end of physician one to one monitoring.          Findings: U/S guided random liver biopsy, performed using an intercostal approach. Two 18 gauge cores taken.    Estimated Blood Loss: Less than 10 ml    Fluoroscopy Time: 0 minute(s)    SPECIMENS: Core needle biopsy specimens sent for pathological analysis    Complications: 1. None     Condition: Stable    Plan: Follow up per primary team.    Comments: See dictated procedure note for full details.    Kevin Medina PA-C

## 2018-11-01 NOTE — IP AVS SNAPSHOT
[address] [address]                                       After Visit Summary   11/1/2018    Satnam Gonzales    MRN: [id number]           After Visit Summary Signature Page     I have received my discharge instructions, and my questions have been answered. I have discussed any challenges I see with this plan with the nurse or doctor.    ..........................................................................................................................................  Patient/Patient Representative Signature      ..........................................................................................................................................  Patient Representative Print Name and Relationship to Patient    ..................................................               ................................................  Date                                   Time    ..........................................................................................................................................  Reviewed by Signature/Title    ...................................................              ..............................................  Date                                               Time          22EPIC Rev 08/18

## 2018-11-02 LAB — COPATH REPORT: NORMAL

## 2018-11-08 DIAGNOSIS — R79.89 ABNORMAL LFTS (LIVER FUNCTION TESTS): ICD-10-CM

## 2018-11-08 PROCEDURE — 36415 COLL VENOUS BLD VENIPUNCTURE: CPT | Performed by: PHYSICIAN ASSISTANT

## 2018-11-08 PROCEDURE — 80076 HEPATIC FUNCTION PANEL: CPT | Performed by: PHYSICIAN ASSISTANT

## 2018-11-09 LAB
ALBUMIN SERPL-MCNC: 4.2 G/DL (ref 3.4–5)
ALP SERPL-CCNC: 114 U/L (ref 40–150)
ALT SERPL W P-5'-P-CCNC: 389 U/L (ref 0–70)
AST SERPL W P-5'-P-CCNC: 166 U/L (ref 0–45)
BILIRUB DIRECT SERPL-MCNC: 0.2 MG/DL (ref 0–0.2)
BILIRUB SERPL-MCNC: 1.1 MG/DL (ref 0.2–1.3)
PROT SERPL-MCNC: 7.4 G/DL (ref 6.8–8.8)

## 2018-11-28 DIAGNOSIS — R79.89 ABNORMAL LFTS (LIVER FUNCTION TESTS): ICD-10-CM

## 2018-11-28 PROCEDURE — 80076 HEPATIC FUNCTION PANEL: CPT | Performed by: PHYSICIAN ASSISTANT

## 2018-11-28 PROCEDURE — 36415 COLL VENOUS BLD VENIPUNCTURE: CPT | Performed by: PHYSICIAN ASSISTANT

## 2018-11-29 LAB
ALBUMIN SERPL-MCNC: 4.4 G/DL (ref 3.4–5)
ALP SERPL-CCNC: 99 U/L (ref 40–150)
ALT SERPL W P-5'-P-CCNC: 208 U/L (ref 0–70)
AST SERPL W P-5'-P-CCNC: 63 U/L (ref 0–45)
BILIRUB DIRECT SERPL-MCNC: 0.2 MG/DL (ref 0–0.2)
BILIRUB SERPL-MCNC: 0.7 MG/DL (ref 0.2–1.3)
PROT SERPL-MCNC: 7.8 G/DL (ref 6.8–8.8)

## 2019-01-02 DIAGNOSIS — R79.89 ABNORMAL LFTS (LIVER FUNCTION TESTS): ICD-10-CM

## 2019-01-02 PROCEDURE — 80076 HEPATIC FUNCTION PANEL: CPT | Performed by: PHYSICIAN ASSISTANT

## 2019-01-02 PROCEDURE — 36415 COLL VENOUS BLD VENIPUNCTURE: CPT | Performed by: PHYSICIAN ASSISTANT

## 2019-01-03 LAB
ALBUMIN SERPL-MCNC: 4.2 G/DL (ref 3.4–5)
ALP SERPL-CCNC: 95 U/L (ref 40–150)
ALT SERPL W P-5'-P-CCNC: 77 U/L (ref 0–70)
AST SERPL W P-5'-P-CCNC: 36 U/L (ref 0–45)
BILIRUB DIRECT SERPL-MCNC: 0.2 MG/DL (ref 0–0.2)
BILIRUB SERPL-MCNC: 0.6 MG/DL (ref 0.2–1.3)
PROT SERPL-MCNC: 7.2 G/DL (ref 6.8–8.8)

## 2019-01-05 ENCOUNTER — MYC MEDICAL ADVICE (OUTPATIENT)
Dept: GASTROENTEROLOGY | Facility: CLINIC | Age: 39
End: 2019-01-05

## 2019-01-05 DIAGNOSIS — K75.4 AUTOIMMUNE HEPATITIS (H): Primary | ICD-10-CM

## 2019-01-30 DIAGNOSIS — K75.4 AUTOIMMUNE HEPATITIS (H): ICD-10-CM

## 2019-01-30 DIAGNOSIS — R79.89 ABNORMAL LFTS (LIVER FUNCTION TESTS): ICD-10-CM

## 2019-01-30 LAB
ALBUMIN SERPL-MCNC: 4.1 G/DL (ref 3.4–5)
ALP SERPL-CCNC: 99 U/L (ref 40–150)
ALT SERPL W P-5'-P-CCNC: 60 U/L (ref 0–70)
AST SERPL W P-5'-P-CCNC: 38 U/L (ref 0–45)
BILIRUB DIRECT SERPL-MCNC: 0.2 MG/DL (ref 0–0.2)
BILIRUB SERPL-MCNC: 0.9 MG/DL (ref 0.2–1.3)
ERYTHROCYTE [DISTWIDTH] IN BLOOD BY AUTOMATED COUNT: 12.9 % (ref 10–15)
HCT VFR BLD AUTO: 45.6 % (ref 40–53)
HGB BLD-MCNC: 15.5 G/DL (ref 13.3–17.7)
MCH RBC QN AUTO: 31.8 PG (ref 26.5–33)
MCHC RBC AUTO-ENTMCNC: 34 G/DL (ref 31.5–36.5)
MCV RBC AUTO: 93 FL (ref 78–100)
PLATELET # BLD AUTO: 191 10E9/L (ref 150–450)
PROT SERPL-MCNC: 7.1 G/DL (ref 6.8–8.8)
RBC # BLD AUTO: 4.88 10E12/L (ref 4.4–5.9)
WBC # BLD AUTO: 5.5 10E9/L (ref 4–11)

## 2019-01-30 PROCEDURE — 85027 COMPLETE CBC AUTOMATED: CPT | Performed by: PHYSICIAN ASSISTANT

## 2019-01-30 PROCEDURE — 36415 COLL VENOUS BLD VENIPUNCTURE: CPT | Performed by: PHYSICIAN ASSISTANT

## 2019-01-30 PROCEDURE — 80076 HEPATIC FUNCTION PANEL: CPT | Performed by: PHYSICIAN ASSISTANT

## 2019-01-30 RX ORDER — PREDNISONE 5 MG/1
2.5 TABLET ORAL DAILY
Qty: 90 TABLET | Refills: 0 | Status: SHIPPED | OUTPATIENT
Start: 2019-01-30 | End: 2019-04-30

## 2019-01-31 ENCOUNTER — OFFICE VISIT (OUTPATIENT)
Dept: PODIATRY | Facility: CLINIC | Age: 39
End: 2019-01-31
Payer: COMMERCIAL

## 2019-01-31 VITALS
SYSTOLIC BLOOD PRESSURE: 123 MMHG | BODY MASS INDEX: 28.94 KG/M2 | WEIGHT: 196 LBS | DIASTOLIC BLOOD PRESSURE: 81 MMHG | HEART RATE: 60 BPM

## 2019-01-31 DIAGNOSIS — M72.2 PLANTAR FASCIITIS: Primary | ICD-10-CM

## 2019-01-31 DIAGNOSIS — M76.62 TENDONITIS, ACHILLES, LEFT: ICD-10-CM

## 2019-01-31 DIAGNOSIS — M65.972 SYNOVITIS OF LEFT FOOT: ICD-10-CM

## 2019-01-31 PROCEDURE — 99203 OFFICE O/P NEW LOW 30 MIN: CPT | Performed by: PODIATRIST

## 2019-01-31 NOTE — PROGRESS NOTES
Foot & Ankle Surgery  January 31, 2019    CC: bl heel pain; left achilles pain; L 1st MPJ pain    I was asked to see Satnam Gonzales regarding the chief complaint by:  self    HPI:  Pt is a 38 year old male who presents with above complaint.  Bilateral heel pain since October, doesn't recall any injury.  He has cut back on his running.  He notes morning pain and post-static dyskinesia. He also notes a pulling sensation posterior L heel, as well as pain at the 1st MPJ, feels like someone is pulling a string.  Resting, but no other treatments noted.    ROS:   Pos for CC.  The patient denies current nausea, vomiting, chills, fevers, belly pain, calf pain, chest pain or SOB.  Complete remainder of ROS is otherwise neg.    VITALS:    Vitals:    01/31/19 1124   BP: 123/81   Pulse: 60   Weight: 88.9 kg (196 lb)       PMH:    Past Medical History:   Diagnosis Date     Depression      Eosinophilic esophagitis      Liver enzyme elevation        SXHX:    Past Surgical History:   Procedure Laterality Date     NO HISTORY OF SURGERY          MEDS:    Current Outpatient Medications   Medication     azaTHIOprine 75 MG TABS     Cetirizine HCl (ZYRTEC PO)     multivitamin, therapeutic with minerals (MULTI-VITAMIN) TABS tablet     pantoprazole (PROTONIX) 40 MG EC tablet     predniSONE (DELTASONE) 5 MG tablet     No current facility-administered medications for this visit.        ALL:     Allergies   Allergen Reactions     Seasonal Allergies        FMH:    Family History   Problem Relation Age of Onset     C.A.D. Paternal Grandfather 50     Alzheimer Disease Maternal Grandfather 73     Food Allergy Father      Seasonal/Environmental Allergies Brother        SocHx:    Social History     Socioeconomic History     Marital status:      Spouse name: Not on file     Number of children: Not on file     Years of education: Not on file     Highest education level: Not on file   Social Needs     Financial resource strain: Not on file      Food insecurity - worry: Not on file     Food insecurity - inability: Not on file     Transportation needs - medical: Not on file     Transportation needs - non-medical: Not on file   Occupational History     Not on file   Tobacco Use     Smoking status: Former Smoker     Packs/day: 1.00     Years: 10.00     Pack years: 10.00     Smokeless tobacco: Former User     Types: Chew   Substance and Sexual Activity     Alcohol use: No     Drug use: No     Sexual activity: Yes     Partners: Female   Other Topics Concern     Parent/sibling w/ CABG, MI or angioplasty before 65F 55M? Not Asked   Social History Narrative     Not on file           EXAMINATION:  Gen:   No apparent distress  Neuro:   A&Ox3, no deficits  Psych:    Answering questions appropriately for age and situation with normal affect  Head:    NCAT  Eye:    Visual scanning without deficit  Ear:    Response to auditory stimuli wnl  Lung:    Non-labored breathing on RA noted  Abd:    NTND per patient report  Lymph:    Neg for pitting/non-pitting edema BLE  Vasc:    Pulses palpable, CFT minimally delayed  Neuro:    Light touch sensation intact to all sensory nerve distributions without paresthesias  Derm:    Neg for nodules, lesions or ulcerations  MSK:    Bilateral heel - pain at plantar heel.  No ICN/tibial nerve or calcaneal pain.  Left Achilles insertion pain without retrocalcaneal bursa pain or tendon pain/thickening.  L 1st MPJ tender dorsally without appreciable redness/swelling, but PROM approx 65 degrees without pain/crepitus  Calf:    Neg for redness, swelling or tenderness    Assessment:  38 year old male with bilateral plantar fasciitis; left lower extremity insertion Achilles tendonitis; left lower extremity 1st MPJ synovitis       Plan:  Discussed etiologies, anatomy and options  1.  Bilateral plantar fasciitis   -Regarding the heel pain, the Plantar Fasciitis handout was dispensed and discussed.  We talked about stretching, resting/activity  modification, icing, NSAID/tylenol use as tolerated, inserts, supportive/comfortable shoes and minimizing shoeless walking.    -discussed Achilles, plantar fascial and hamstring stretches  -OTC insert information dispensed and discussed     2.  Left Achilles insertion tendonitis  -Regarding the Achilles tendonitis, the home therapies were discussed.  We talked about stretching, resting/activity modification, icing, NSAID/tylenol use as tolerated, inserts, supportive shoes and minimizing shoeless walking.    -discussed Achilles and hamstring stretches  -OTC insert information dispensed and discussed    3.  Left 1st MPJ synovitis   -comfortable shoe gear  -OTC inserts  -RICE/NSAID vs tylenol prn  -activity modifications based on pain levels        Follow up:  4 weeks or sooner with acute issues      Patient's medical history was reviewed today    Body mass index is 28.94 kg/m .  Weight management plan: Patient was referred to their PCP to discuss a diet and exercise plan.        Garo Mcdaniel DPM FACFAS FACFAOM  Podiatric Foot & Ankle Surgeon  [facility]  [phone]

## 2019-01-31 NOTE — PATIENT INSTRUCTIONS
Thank you for choosing [facility]!    DR. HERNANDEZ'S CLINIC LOCATIONS:   MONDAY - [place] TUESDAY - [place]   [address] [facility]  [address]   [place] [address] [place] [address]   [phone]       THURSDAY AM - [place] THURSDAY PM - [place]   [address]   [place] [address] [place] [address]   [phone]       FRIDAY AM - [place] SET UP SURGERY: [phone] [address] APPOINTMENTS: [phone]   [place] [address] BILLING QUESTIONS: [phone] FAX NUMBER: [phone]     Follow Up:  4 weeks    FYI: Body Mass Index (BMI)  Many things can cause foot and ankle problems. Foot structure, activity level, foot mechanics and injuries are common causes of pain. One very important issue that often goes unmentioned, is body weight. Extra weight can cause increased stress on muscles, ligaments, bones and tendons. Sometimes just a few extra pounds is all it takes to put one over her/his threshold. Without reducing that stress, it can be difficult to alleviate pain. Some people are uncomfortable addressing this issue, but we feel it is important for you to think about it. As Foot &  Ankle specialists, our job is addressing the lower extremity problem and possible causes. Regarding extra body weight, we encourage patients to discuss diet and weight management plans with their primary care doctors. It is this team approach that gives you the best opportunity for pain relief and getting you back on your feet.    PLANTAR FASCIITIS   What is plantar fasciitis?   Plantar fasciitis is often referred to as heel spurs or heel pain. Plantar fasciitis is a very common problem that affects people of all foot shapes, age, weight and activity level. Pain may be in the arch or on the weight-bearing surface of the heel. The pain maycome on without injury or identifiable cause. Pain is  generally present when first getting out of bed in the morning or up from a seated break.   What causes plantar fasciitis?   The plantar fascia is a dense fibrous band of tissue that stretches across the bottom surface of the foot. The fascia helps support the foot muscles and arch. Plantar fasciitis is thought to be caused by mechanical strain or overload. Frequent walking without shoes or wearing unsupportive shoes is thought to cause structural overload and ultimately inflammation of the plantar fascia. Some people have heel spurs that can be seen on x-ray. The heel spur is actually a minor component of plantar fascitis and is largely ignored.   How long will this last?   Plantar fasciitis can last from one day to a lifetime. Some people get intermittent fascitis that is very short-lived. Others suffer daily for years. Excessive body weight, frequent bare foot walking, long hours on the feet, inadequate shoes, predisposing foot structures and excessive activity such as running are all potential issues that lead to chronic and/or recurring plantar fascitis. Having plantar fasciitis means that you are forever prone to this problem and will require modification of some of the above factors. Most people seek treatment within one to four months. Healing usually requires a similar one to four month time frame. Healing time is relative to the amount of effort spent treating the problem.   What can I do?   The easiest solution is to stop walking around your home without shoes. Plantar fasciitis is largely a shoe problem. Shoes are either not being worn often enough or your current shoes are inadequate for your weight, foot structure or activity level. The majority of shoes on the market today are not sufficient to resist development of plantar fasciitis or to promote healing. Assume that your current shoes are inadequate and will need to be replaced. Even high quality shoes wear out with 6 months to one year of frequent  use. Weightloss is another option. Losing ten pounds in the next two months may be enough to resolve the problem. Ice applied to the area of pain two to three times per day for ten minutes each session can be very helpful. This should continue until the problem resolves. Achilles tendon stretching is essential. Stretch multiple times daily to promote healing and to prevent recurrence in the future.     What if this does not help?   Medical treatments often include custom arch supports, cortisone injections, physical therapy, splints to be worn in bed, prescription medications and surgery. The home treatments listed above will be necessary regardless of these advanced medical treatments. Surgery is rarely needed but is very helpful in selected cases.     Heel pain in my future?   Plantar fasciitis is highly recurrent. Risk factors often continue, including return to bare foot walking, inadequate shoes, excessive body weight, excessive activities, etc. Your life style and foot structure may predispose you to recurrent plantar fasciitis. A daily prevention regimen can be very helpful. Ongoing use of shoe inserts, careful attention to appropriate shoes, daily Achilles stretching, etc. may prevent recurrence. Prompt attention at the earliest warning signs of heel pain can resolve the problem in as short as a few days.   Below are some exercises for Plantar fasciitis:  Stair exercise  You can step on the stairs with the ball of your foot and hold your position for at least 15 seconds, then slowly step down with the heels of your foot. You can do this daily and as often as you want. Plantar fasciitis exercise equipment and handout materials are useful in relieving pain.  Picking the towel  Plantar fasciitis exercise equipment and handout teach you how to exercise by picking the towel. You can sit comfortably and then pick the towel with your toes. Do this at least 10 to 20 times regularly. You can use any object other than  a towel as long as the material can be soft and you can pick it up with your toes.  Rolling the bottle or ball  You can get a small ball or bottle and then roll it with your foot. Do this daily for at least 15 to 20 times. Plantar fasciitis exercise equipment and handout are very useful in treating the symptoms of the foot condition.  Stretching the calf  You could lie supine, raise one foot, and then point your toes towards the floor. Do this daily for at least 15 to 20 times. The calf is connected to the heel and the balls of the foot, so you should also exercise this also. Plantar fasciitis exercise equipment and handout usually mentions the importance of exercising the calf also.  Flex the toes  Sit comfortably and then flex your toes by pointing it towards the floor or towards your body. This will relax and flex your foot and exercise your plantar fascia, the calf, and the Achilles tendon. The inability of the foot to stretch often causes the bunching up of the plantar fascia area leading to the pain.  Massaging the calf and the plantar fascia also helps a lot in alleviating the pain and preventing its recurrence. If you prefer standard plantar fasciitis exercise equipment and handout, then you can avail of this from legitimate sources. You can use the standard stretching device, the wheel, and the belt. These are all significant devices in treating the pain in the plantar fascia.    Therapies covered by Dr Mcdaniel today:    1.  Supportive shoes, minimizing barefoot ambulation - helps to provide cushion, padding and support to the ligament that is inflamed.  Socks, flip flops, flats and some slippers are not typically sufficient to provide support.  Shoes should be worn even in-doors  2.  Insert/orthotics - inserts/orthotics that have an arch support built in to them provide further stress relief for the ligament.  3. Icing - using a frozen water bottle or orange, and rolling it along the bottom of the  "arch/heel can help to alleviate discomfort, and can act as a tissue massage to the painful, inflamed ligament.  There is evidence that shows icing at least three times daily can be beneficial  4.  Anti-inflammatory(NSAIDS)/Tylenol - anti-inflammatories, such as ibuprofen or Aleve, as well as Tylenol can be used to help decrease symptoms and improve pain levels.  If you have high blood pressure, heart disease, stomach or kidney problems, use anti-inflammatories sparingly.  Tylenol should not be used if you have liver problems.  If you can safely taken them, you can use NSAIDS and tylenol in combination for pain relief  5. Activity modifications - if there are certain things that you do, whether it's going barefoot or certain shoes/activities, you should try to minimize those activities as much as possible until your symptoms are sufficiently resolved.  Certainly, some activities, such as running on the treadmill, are easier to take a break from versus others, such as work or chores at home.  \"If there are certain activities that hurt your heel, and you keep doing those activities that hurt your heel, your heel will keep hurting\".    6.  Stretching - Stretching your Achilles and hamstring can help to decrease stress on the plantar fascia.                   Hold each stretch for 10 seconds.  Stretch 10 times per set, three sets per day.  Morning, afternoon and evening.  If your heel pain is very severe in the morning, consider doing the first set of stretches before you get out of bed.            If these initial therapies are insufficient, we have our tier 2 therapies that can more aggressively work to improve your symptoms and get you back to the activities that you enjoy.      "

## 2019-02-18 DIAGNOSIS — K20.0 EOSINOPHILIC ESOPHAGITIS: ICD-10-CM

## 2019-02-19 NOTE — TELEPHONE ENCOUNTER
"pantoprazole (PROTONIX) 40 MG EC tablet    Last Written Prescription Date:  02/08/2018  Last Fill Quantity: 30,  # refills: 11   Last office visit: 10/15/2018 with prescribing provider:  Christofer  Future Office Visit:  Unknown     Requested Prescriptions   Pending Prescriptions Disp Refills     pantoprazole (PROTONIX) 40 MG EC tablet 30 tablet 11     Sig: Take 1 tablet (40 mg) by mouth daily Take 30-60 minutes before a meal.    PPI Protocol Passed - 2/18/2019  4:07 PM       Passed - Not on Clopidogrel (unless Pantoprazole ordered)       Passed - No diagnosis of osteoporosis on record       Passed - Recent (12 mo) or future (30 days) visit within the authorizing provider's specialty    Patient had office visit in the last 12 months or has a visit in the next 30 days with authorizing provider or within the authorizing provider's specialty.  See \"Patient Info\" tab in inbasket, or \"Choose Columns\" in Meds & Orders section of the refill encounter.             Passed - Medication is active on med list       Passed - Patient is age 18 or older          "

## 2019-02-20 RX ORDER — PANTOPRAZOLE SODIUM 40 MG/1
40 TABLET, DELAYED RELEASE ORAL DAILY
Qty: 30 TABLET | Refills: 10 | Status: SHIPPED | OUTPATIENT
Start: 2019-02-20 | End: 2020-01-10

## 2019-04-29 DIAGNOSIS — K75.4 AUTOIMMUNE HEPATITIS (H): Primary | ICD-10-CM

## 2019-04-30 ENCOUNTER — OFFICE VISIT (OUTPATIENT)
Dept: GASTROENTEROLOGY | Facility: CLINIC | Age: 39
End: 2019-04-30
Attending: PHYSICIAN ASSISTANT
Payer: COMMERCIAL

## 2019-04-30 VITALS
RESPIRATION RATE: 16 BRPM | SYSTOLIC BLOOD PRESSURE: 119 MMHG | BODY MASS INDEX: 27.73 KG/M2 | HEIGHT: 69 IN | OXYGEN SATURATION: 99 % | WEIGHT: 187.2 LBS | DIASTOLIC BLOOD PRESSURE: 73 MMHG | HEART RATE: 59 BPM | TEMPERATURE: 98.1 F

## 2019-04-30 DIAGNOSIS — K75.4 AUTOIMMUNE HEPATITIS (H): Primary | ICD-10-CM

## 2019-04-30 DIAGNOSIS — K75.4 AUTOIMMUNE HEPATITIS (H): ICD-10-CM

## 2019-04-30 LAB
ALBUMIN SERPL-MCNC: 4.3 G/DL (ref 3.4–5)
ALP SERPL-CCNC: 94 U/L (ref 40–150)
ALT SERPL W P-5'-P-CCNC: 55 U/L (ref 0–70)
ANION GAP SERPL CALCULATED.3IONS-SCNC: 3 MMOL/L (ref 3–14)
AST SERPL W P-5'-P-CCNC: 33 U/L (ref 0–45)
BILIRUB DIRECT SERPL-MCNC: 0.2 MG/DL (ref 0–0.2)
BILIRUB SERPL-MCNC: 0.9 MG/DL (ref 0.2–1.3)
BUN SERPL-MCNC: 14 MG/DL (ref 7–30)
CALCIUM SERPL-MCNC: 9.5 MG/DL (ref 8.5–10.1)
CHLORIDE SERPL-SCNC: 104 MMOL/L (ref 94–109)
CO2 SERPL-SCNC: 31 MMOL/L (ref 20–32)
CREAT SERPL-MCNC: 1.12 MG/DL (ref 0.66–1.25)
ERYTHROCYTE [DISTWIDTH] IN BLOOD BY AUTOMATED COUNT: 12.1 % (ref 10–15)
GFR SERPL CREATININE-BSD FRML MDRD: 82 ML/MIN/{1.73_M2}
GLUCOSE SERPL-MCNC: 103 MG/DL (ref 70–99)
HCT VFR BLD AUTO: 45.4 % (ref 40–53)
HGB BLD-MCNC: 15.3 G/DL (ref 13.3–17.7)
INR PPP: 1.18 (ref 0.86–1.14)
MCH RBC QN AUTO: 32.4 PG (ref 26.5–33)
MCHC RBC AUTO-ENTMCNC: 33.7 G/DL (ref 31.5–36.5)
MCV RBC AUTO: 96 FL (ref 78–100)
PLATELET # BLD AUTO: 172 10E9/L (ref 150–450)
POTASSIUM SERPL-SCNC: 4.6 MMOL/L (ref 3.4–5.3)
PROT SERPL-MCNC: 7.5 G/DL (ref 6.8–8.8)
RBC # BLD AUTO: 4.72 10E12/L (ref 4.4–5.9)
SODIUM SERPL-SCNC: 138 MMOL/L (ref 133–144)
WBC # BLD AUTO: 6.1 10E9/L (ref 4–11)

## 2019-04-30 PROCEDURE — 85027 COMPLETE CBC AUTOMATED: CPT | Performed by: PHYSICIAN ASSISTANT

## 2019-04-30 PROCEDURE — 36415 COLL VENOUS BLD VENIPUNCTURE: CPT | Performed by: PHYSICIAN ASSISTANT

## 2019-04-30 PROCEDURE — 80076 HEPATIC FUNCTION PANEL: CPT | Performed by: PHYSICIAN ASSISTANT

## 2019-04-30 PROCEDURE — G0463 HOSPITAL OUTPT CLINIC VISIT: HCPCS | Mod: ZF

## 2019-04-30 PROCEDURE — 85610 PROTHROMBIN TIME: CPT | Performed by: PHYSICIAN ASSISTANT

## 2019-04-30 PROCEDURE — 80048 BASIC METABOLIC PNL TOTAL CA: CPT | Performed by: PHYSICIAN ASSISTANT

## 2019-04-30 ASSESSMENT — PAIN SCALES - GENERAL: PAINLEVEL: NO PAIN (0)

## 2019-04-30 ASSESSMENT — MIFFLIN-ST. JEOR: SCORE: 1749.51

## 2019-04-30 NOTE — NURSING NOTE
"Chief Complaint   Patient presents with     RECHECK     Elevated LFT's       Vital signs:  Temp: 98.1  F (36.7  C) Temp src: Oral BP: 119/73 Pulse: 59   Resp: 16 SpO2: 99 %     Height: 175.3 cm (5' 9\") Weight: 84.9 kg (187 lb 3.2 oz)  Estimated body mass index is 27.64 kg/m  as calculated from the following:    Height as of this encounter: 1.753 m (5' 9\").    Weight as of this encounter: 84.9 kg (187 lb 3.2 oz).          Elda Gurrola [facility]  4/30/2019 11:44 AM      "

## 2019-04-30 NOTE — LETTER
4/30/2019       RE: Satnam Gonzales  [address]  [place] [address]     Dear Colleague,    Thank you for referring your patient, Satnam Gonzales, to the [facility] at [facility]. Please see a copy of my visit note below.    Hepatology Follow-up Clinic note  Satnam Gonzales   Date of Birth 1980  Date of Service 4/30/2019    Reason for follow-up: [facility]          Assessment/plan:   Satnam Gonzales is a 39 year old male with liver biopsy findings suggestive of autoimmune hepatitis who currently maintained on 75 mg prednisone and 2.5 prednisone. Liver biopsy in November 2018 showing Stage 2 fibrosis. His ALT is 55 and AST 33 today. He has mildly elevated INR, which is not likely due to liver dysfunction. He otherwise has normal liver function. He has no physical stigmata of chronic liver disease.     - Continue 75 mg of azathioprine   - CBC every 3 months  - Discontinue 2.5 mg Prednisone   - Repeat Hepatic panel in 3 months  - Follow-up in clinic in 6 months or sooner as needed    Nae Do PA-C   [facility]    -----------------------------------------------------       HPI:   Satnam Gonzales is a 39 year old male presenting for follow-up. He is accompanied by his wife today.     Autoimmune Hepatitis   Liver Biopsy on 11/1/2018: mildly active hepatitis with chronic features  Stage 2 fibrosis   F-actin - 56   Treatment:   -Prednisone taper started 11/2018, stayed on 2.5 mg since February 2019  -Azathioprine started 11/2018, increased to 75 mg in January 5, 2019    Patient was last seen on 10/18/2018. He had a liver biopsy that was consistent with mildly active hepatitis with chronic features and Stage 2 fibrosis. Given high f-actin of 56. He has been compliant with this Prednisone taper and Azathioprine medications.     His appetite is stable. He is starting   Patient denies jaundice, lower extremity edema, abdominal distension or confusion.   "Patient also denies melena, hematochezia or hematemesis. Patient denies weight loss, fevers, sweats or chills.    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     f-actin - 56     Medical hx Surgical hx   Past Medical History:   Diagnosis Date     Depression      Eosinophilic esophagitis      Liver enzyme elevation     Past Surgical History:   Procedure Laterality Date     NO HISTORY OF SURGERY                   Medications:     Current Outpatient Medications   Medication     azaTHIOprine 75 MG TABS     Cetirizine HCl (ZYRTEC PO)     multivitamin, therapeutic with minerals (MULTI-VITAMIN) TABS tablet     pantoprazole (PROTONIX) 40 MG EC tablet     No current facility-administered medications for this visit.             Allergies:     Allergies   Allergen Reactions     Seasonal Allergies             Review of Systems:   10 points ROS was obtained and highlighted in the HPI, otherwise negative.          Physical Exam:   VS:  /73 (BP Location: Right arm, Patient Position: Sitting, Cuff Size: Adult Large)   Pulse 59   Temp 98.1  F (36.7  C) (Oral)   Resp 16   Ht 1.753 m (5' 9\")   Wt 84.9 kg (187 lb 3.2 oz)   SpO2 99%   BMI 27.64 kg/m         Gen- well, NAD, A+Ox3, normal color  Lym- no palpable LAD  CVS- RRR  RS- CTA  Abd- soft, nontender. Mild hepatomegaly. No splenomegaly.   Extr- hands normal, no DANNIELLE  Skin- no rash or jaundice  Neuro- no asterixis  Psych- normal mood           Data:   Reviewed in person and significant for:    Lab Results   Component Value Date     10/23/2018      Lab Results   Component Value Date    POTASSIUM 3.9 10/23/2018     Lab Results   Component Value Date    CHLORIDE 102 10/23/2018     Lab Results   Component Value Date    CO2 32 10/23/2018     Lab Results   Component Value Date    BUN 11 10/23/2018     Lab Results   Component Value Date    CR 0.95 10/23/2018       Lab Results   Component Value Date    WBC " 5.5 01/30/2019     Lab Results   Component Value Date    HGB 15.5 01/30/2019     Lab Results   Component Value Date    HCT 45.6 01/30/2019     Lab Results   Component Value Date    MCV 93 01/30/2019     Lab Results   Component Value Date     01/30/2019       Lab Results   Component Value Date    AST 38 01/30/2019     Lab Results   Component Value Date    ALT 60 01/30/2019     No results found for: BILICONJ   Lab Results   Component Value Date    BILITOTAL 0.9 01/30/2019       Lab Results   Component Value Date    ALBUMIN 4.1 01/30/2019     Lab Results   Component Value Date    PROTTOTAL 7.1 01/30/2019      Lab Results   Component Value Date    ALKPHOS 99 01/30/2019       Lab Results   Component Value Date    INR 1.10 10/23/2018       LIVER NEEDLE BIOPSY:   1/11/2018   - Mildly active hepatitis with features of possible chronicity   - See microscopic description regarding potential etiology     Examination of the trichrome stain reveals liver showing patchy mild   portal fibrosis without evidence of bridging fibrosis. There is no pericellular fibrosis. The reticulin stain shows mild focal hepatocellular atrophy without areas of collapse. This atrophy is predominantly centrilobular. The hematoxylin and eosin stained slides demonstrate a mild somewhat patchy portal infiltrate of lymphocytes without a significant population of plasma cells or eosinophils and without obvious piecemeal necrosis. Bile ducts are intact. The hepatocellular lobule shows essentially no fatty change. There are some clusters of pigmented Kupffer cells present, and there are small numbers of lymphocytes in the sinusoids along  with rare plasma cells and eosinophils. Occasional acidophil bodies are noted along with hepatocellular mitoses.     This biopsy demonstrates evidence of hepatitis which overall is only mildly active at this point with a question of possible chronicity given the focal enlargement of portal tracts. The presence of  prominent pigmented Kupffer cells could indicate that there was more extensive necrosis in the recent past and that we are looking at a resolving process. The biopsy findings are not definitive  in regard to etiology. Certainly viral hepatitis is a consideration, however the usual hepatotropic viruses  have been ruled out. The presence of a positive F-actin antibody would suggest the possibility of autoimmune  hepatitis, and I cannot exclude that possibility entirely. The presence of some possible chronicity along with occasional plasma cells could indicate that this is mild or fluctuating autoimmune hepatitis, however I   would not render that diagnosis at this point in time. It is possible that this is simply a transient acute hepatitis either due to a non-hepatotropic virus, or possibly to some over-the-counter medication or  environmental factor, and that his liver enzymes will resolve spontaneously. If they do not resolve, then autoimmune hepatitis becomes a more significant consideration.       Again, thank you for allowing me to participate in the care of your patient.      Sincerely,    Nae Do PA-C

## 2019-04-30 NOTE — LETTER
4/30/2019      RE: Satnam Gonzales  [address]  [facility] [address]       Hepatology Follow-up Clinic note  Satnam Gonzales   Date of Birth 1980  Date of Service 4/30/2019    Reason for follow-up: [facility]          Assessment/plan:   Satnam Gonzales is a 39 year old male with liver biopsy findings suggestive of autoimmune hepatitis who currently maintained on 75 mg prednisone and 2.5 prednisone. Liver biopsy in November 2018 showing Stage 2 fibrosis. His ALT is 55 and AST 33 today. He has mildly elevated INR, which is not likely due to liver dysfunction. He otherwise has normal liver function. He has no physical stigmata of chronic liver disease.     - Continue 75 mg of azathioprine   - CBC every 3 months  - Discontinue 2.5 mg Prednisone   - Repeat Hepatic panel in 3 months  - Follow-up in clinic in 6 months or sooner as needed    Nae Do PA-C   [facility] Hepatology clinic    -----------------------------------------------------       HPI:   Satnam Gonzales is a 39 year old male presenting for follow-up. He is accompanied by his wife today.     Autoimmune Hepatitis   Liver Biopsy on 11/1/2018: mildly active hepatitis with chronic features  Stage 2 fibrosis   F-actin - 56   Treatment:   -Prednisone taper started 11/2018, stayed on 2.5 mg since February 2019  -Azathioprine started 11/2018, increased to 75 mg in January 5, 2019    Patient was last seen on 10/18/2018. He had a liver biopsy that was consistent with mildly active hepatitis with chronic features and Stage 2 fibrosis. Given high f-actin of 56. He has been compliant with this Prednisone taper and Azathioprine medications.     His appetite is stable. He is starting   Patient denies jaundice, lower extremity edema, abdominal distension or confusion.  Patient also denies melena, hematochezia or hematemesis. Patient denies weight loss, fevers, sweats or chills.    He does not drink alcohol. He works at a teacher. Traveling to [place] this  "summer and [place] this fall.     f-actin - 56     Medical hx Surgical hx   Past Medical History:   Diagnosis Date     Depression      Eosinophilic esophagitis      Liver enzyme elevation     Past Surgical History:   Procedure Laterality Date     NO HISTORY OF SURGERY                   Medications:     Current Outpatient Medications   Medication     azaTHIOprine 75 MG TABS     Cetirizine HCl (ZYRTEC PO)     multivitamin, therapeutic with minerals (MULTI-VITAMIN) TABS tablet     pantoprazole (PROTONIX) 40 MG EC tablet     No current facility-administered medications for this visit.             Allergies:     Allergies   Allergen Reactions     Seasonal Allergies             Review of Systems:   10 points ROS was obtained and highlighted in the HPI, otherwise negative.          Physical Exam:   VS:  /73 (BP Location: Right arm, Patient Position: Sitting, Cuff Size: Adult Large)   Pulse 59   Temp 98.1  F (36.7  C) (Oral)   Resp 16   Ht 1.753 m (5' 9\")   Wt 84.9 kg (187 lb 3.2 oz)   SpO2 99%   BMI 27.64 kg/m         Gen- well, NAD, A+Ox3, normal color  Lym- no palpable LAD  CVS- RRR  RS- CTA  Abd- soft, nontender. Mild hepatomegaly. No splenomegaly.   Extr- hands normal, no DANNIELLE  Skin- no rash or jaundice  Neuro- no asterixis  Psych- normal mood           Data:   Reviewed in person and significant for:    Lab Results   Component Value Date     10/23/2018      Lab Results   Component Value Date    POTASSIUM 3.9 10/23/2018     Lab Results   Component Value Date    CHLORIDE 102 10/23/2018     Lab Results   Component Value Date    CO2 32 10/23/2018     Lab Results   Component Value Date    BUN 11 10/23/2018     Lab Results   Component Value Date    CR 0.95 10/23/2018       Lab Results   Component Value Date    WBC 5.5 01/30/2019     Lab Results   Component Value Date    HGB 15.5 01/30/2019     Lab Results   Component Value Date    HCT 45.6 01/30/2019     Lab Results   Component Value Date    MCV 93 " 01/30/2019     Lab Results   Component Value Date     01/30/2019       Lab Results   Component Value Date    AST 38 01/30/2019     Lab Results   Component Value Date    ALT 60 01/30/2019     No results found for: BILICONJ   Lab Results   Component Value Date    BILITOTAL 0.9 01/30/2019       Lab Results   Component Value Date    ALBUMIN 4.1 01/30/2019     Lab Results   Component Value Date    PROTTOTAL 7.1 01/30/2019      Lab Results   Component Value Date    ALKPHOS 99 01/30/2019       Lab Results   Component Value Date    INR 1.10 10/23/2018       LIVER NEEDLE BIOPSY:   1/11/2018   - Mildly active hepatitis with features of possible chronicity   - See microscopic description regarding potential etiology     Examination of the trichrome stain reveals liver showing patchy mild   portal fibrosis without evidence of bridging fibrosis. There is no pericellular fibrosis. The reticulin stain shows mild focal hepatocellular atrophy without areas of collapse. This atrophy is predominantly centrilobular. The hematoxylin and eosin stained slides demonstrate a mild somewhat patchy portal infiltrate of lymphocytes without a significant population of plasma cells or eosinophils and without obvious piecemeal necrosis. Bile ducts are intact. The hepatocellular lobule shows essentially no fatty change. There are some clusters of pigmented Kupffer cells present, and there are small numbers of lymphocytes in the sinusoids along  with rare plasma cells and eosinophils. Occasional acidophil bodies are noted along with hepatocellular mitoses.     This biopsy demonstrates evidence of hepatitis which overall is only mildly active at this point with a question of possible chronicity given the focal enlargement of portal tracts. The presence of prominent pigmented Kupffer cells could indicate that there was more extensive necrosis in the recent past and that we are looking at a resolving process. The biopsy findings are not  definitive  in regard to etiology. Certainly viral hepatitis is a consideration, however the usual hepatotropic viruses  have been ruled out. The presence of a positive F-actin antibody would suggest the possibility of autoimmune  hepatitis, and I cannot exclude that possibility entirely. The presence of some possible chronicity along with occasional plasma cells could indicate that this is mild or fluctuating autoimmune hepatitis, however I   would not render that diagnosis at this point in time. It is possible that this is simply a transient acute hepatitis either due to a non-hepatotropic virus, or possibly to some over-the-counter medication or  environmental factor, and that his liver enzymes will resolve spontaneously. If they do not resolve, then autoimmune hepatitis becomes a more significant consideration.       ARUNA EstevezC

## 2019-04-30 NOTE — PROGRESS NOTES
Hepatology Follow-up Clinic note  Satnam Gonzales   Date of Birth 1980  Date of Service 4/30/2019    Reason for follow-up: [facility]          Assessment/plan:   Satnam Gonzales is a 39 year old male with liver biopsy findings suggestive of autoimmune hepatitis who currently maintained on 75 mg prednisone and 2.5 prednisone. Liver biopsy in November 2018 showing Stage 2 fibrosis. His ALT is 55 and AST 33 today. He has mildly elevated INR, which is not likely due to liver dysfunction. He otherwise has normal liver function. He has no physical stigmata of chronic liver disease.     - Continue 75 mg of azathioprine   - CBC every 3 months  - Discontinue 2.5 mg Prednisone   - Repeat Hepatic panel in 3 months  - Follow-up in clinic in 6 months or sooner as needed    Nae Do PA-C   [facility] Hepatology clinic    -----------------------------------------------------       HPI:   Satnam Gonzales is a 39 year old male presenting for follow-up. He is accompanied by his wife today.     Autoimmune Hepatitis   Liver Biopsy on 11/1/2018: mildly active hepatitis with chronic features  Stage 2 fibrosis   F-actin - 56   Treatment:   -Prednisone taper started 11/2018, stayed on 2.5 mg since February 2019  -Azathioprine started 11/2018, increased to 75 mg in January 5, 2019    Patient was last seen on 10/18/2018. He had a liver biopsy that was consistent with mildly active hepatitis with chronic features and Stage 2 fibrosis. Given high f-actin of 56. He has been compliant with this Prednisone taper and Azathioprine medications.     His appetite is stable. He is starting   Patient denies jaundice, lower extremity edema, abdominal distension or confusion.  Patient also denies melena, hematochezia or hematemesis. Patient denies weight loss, fevers, sweats or chills.    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     f-actin - 56     Medical hx Surgical hx   Past  "Medical History:   Diagnosis Date     Depression      Eosinophilic esophagitis      Liver enzyme elevation     Past Surgical History:   Procedure Laterality Date     NO HISTORY OF SURGERY                   Medications:     Current Outpatient Medications   Medication     azaTHIOprine 75 MG TABS     Cetirizine HCl (ZYRTEC PO)     multivitamin, therapeutic with minerals (MULTI-VITAMIN) TABS tablet     pantoprazole (PROTONIX) 40 MG EC tablet     No current facility-administered medications for this visit.             Allergies:     Allergies   Allergen Reactions     Seasonal Allergies             Review of Systems:   10 points ROS was obtained and highlighted in the HPI, otherwise negative.          Physical Exam:   VS:  /73 (BP Location: Right arm, Patient Position: Sitting, Cuff Size: Adult Large)   Pulse 59   Temp 98.1  F (36.7  C) (Oral)   Resp 16   Ht 1.753 m (5' 9\")   Wt 84.9 kg (187 lb 3.2 oz)   SpO2 99%   BMI 27.64 kg/m        Gen- well, NAD, A+Ox3, normal color  Lym- no palpable LAD  CVS- RRR  RS- CTA  Abd- soft, nontender. Mild hepatomegaly. No splenomegaly.   Extr- hands normal, no DANNIELLE  Skin- no rash or jaundice  Neuro- no asterixis  Psych- normal mood           Data:   Reviewed in person and significant for:    Lab Results   Component Value Date     10/23/2018      Lab Results   Component Value Date    POTASSIUM 3.9 10/23/2018     Lab Results   Component Value Date    CHLORIDE 102 10/23/2018     Lab Results   Component Value Date    CO2 32 10/23/2018     Lab Results   Component Value Date    BUN 11 10/23/2018     Lab Results   Component Value Date    CR 0.95 10/23/2018       Lab Results   Component Value Date    WBC 5.5 01/30/2019     Lab Results   Component Value Date    HGB 15.5 01/30/2019     Lab Results   Component Value Date    HCT 45.6 01/30/2019     Lab Results   Component Value Date    MCV 93 01/30/2019     Lab Results   Component Value Date     01/30/2019       Lab Results "   Component Value Date    AST 38 01/30/2019     Lab Results   Component Value Date    ALT 60 01/30/2019     No results found for: BILICONJ   Lab Results   Component Value Date    BILITOTAL 0.9 01/30/2019       Lab Results   Component Value Date    ALBUMIN 4.1 01/30/2019     Lab Results   Component Value Date    PROTTOTAL 7.1 01/30/2019      Lab Results   Component Value Date    ALKPHOS 99 01/30/2019       Lab Results   Component Value Date    INR 1.10 10/23/2018       LIVER NEEDLE BIOPSY:   1/11/2018   - Mildly active hepatitis with features of possible chronicity   - See microscopic description regarding potential etiology     Examination of the trichrome stain reveals liver showing patchy mild   portal fibrosis without evidence of bridging fibrosis. There is no pericellular fibrosis. The reticulin stain shows mild focal hepatocellular atrophy without areas of collapse. This atrophy is predominantly centrilobular. The hematoxylin and eosin stained slides demonstrate a mild somewhat patchy portal infiltrate of lymphocytes without a significant population of plasma cells or eosinophils and without obvious piecemeal necrosis. Bile ducts are intact. The hepatocellular lobule shows essentially no fatty change. There are some clusters of pigmented Kupffer cells present, and there are small numbers of lymphocytes in the sinusoids along  with rare plasma cells and eosinophils. Occasional acidophil bodies are noted along with hepatocellular mitoses.     This biopsy demonstrates evidence of hepatitis which overall is only mildly active at this point with a question of possible chronicity given the focal enlargement of portal tracts. The presence of prominent pigmented Kupffer cells could indicate that there was more extensive necrosis in the recent past and that we are looking at a resolving process. The biopsy findings are not definitive  in regard to etiology. Certainly viral hepatitis is a consideration, however the usual  hepatotropic viruses  have been ruled out. The presence of a positive F-actin antibody would suggest the possibility of autoimmune  hepatitis, and I cannot exclude that possibility entirely. The presence of some possible chronicity along with occasional plasma cells could indicate that this is mild or fluctuating autoimmune hepatitis, however I   would not render that diagnosis at this point in time. It is possible that this is simply a transient acute hepatitis either due to a non-hepatotropic virus, or possibly to some over-the-counter medication or  environmental factor, and that his liver enzymes will resolve spontaneously. If they do not resolve, then autoimmune hepatitis becomes a more significant consideration.

## 2019-05-07 ENCOUNTER — TELEPHONE (OUTPATIENT)
Dept: GASTROENTEROLOGY | Facility: CLINIC | Age: 39
End: 2019-05-07

## 2019-06-17 PROBLEM — J30.2 CHRONIC SEASONAL ALLERGIC RHINITIS: Status: ACTIVE | Noted: 2017-08-03

## 2019-10-10 ENCOUNTER — APPOINTMENT (OUTPATIENT)
Dept: GENERAL RADIOLOGY | Facility: CLINIC | Age: 39
End: 2019-10-10
Attending: EMERGENCY MEDICINE
Payer: COMMERCIAL

## 2019-10-10 ENCOUNTER — HOSPITAL ENCOUNTER (EMERGENCY)
Facility: CLINIC | Age: 39
Discharge: HOME | End: 2019-10-10
Attending: EMERGENCY MEDICINE | Admitting: EMERGENCY MEDICINE
Payer: COMMERCIAL

## 2019-10-10 VITALS
OXYGEN SATURATION: 100 % | HEIGHT: 69 IN | SYSTOLIC BLOOD PRESSURE: 160 MMHG | BODY MASS INDEX: 27.64 KG/M2 | RESPIRATION RATE: 18 BRPM | TEMPERATURE: 98.4 F | DIASTOLIC BLOOD PRESSURE: 91 MMHG

## 2019-10-10 DIAGNOSIS — R07.89 CHEST DISCOMFORT: ICD-10-CM

## 2019-10-10 DIAGNOSIS — R10.13 EPIGASTRIC DISCOMFORT: ICD-10-CM

## 2019-10-10 LAB
ALBUMIN SERPL-MCNC: 4.5 G/DL (ref 3.4–5)
ALP SERPL-CCNC: 106 U/L (ref 40–150)
ALT SERPL W P-5'-P-CCNC: 54 U/L (ref 0–70)
ANION GAP SERPL CALCULATED.3IONS-SCNC: 2 MMOL/L (ref 3–14)
AST SERPL W P-5'-P-CCNC: 31 U/L (ref 0–45)
BASOPHILS # BLD AUTO: 0 10E9/L (ref 0–0.2)
BASOPHILS NFR BLD AUTO: 0.3 %
BILIRUB SERPL-MCNC: 0.6 MG/DL (ref 0.2–1.3)
BUN SERPL-MCNC: 13 MG/DL (ref 7–30)
CALCIUM SERPL-MCNC: 9.3 MG/DL (ref 8.5–10.1)
CHLORIDE SERPL-SCNC: 103 MMOL/L (ref 94–109)
CO2 SERPL-SCNC: 32 MMOL/L (ref 20–32)
CREAT SERPL-MCNC: 1.05 MG/DL (ref 0.66–1.25)
D DIMER PPP FEU-MCNC: 0.3 UG/ML FEU (ref 0–0.5)
DIFFERENTIAL METHOD BLD: NORMAL
EOSINOPHIL # BLD AUTO: 0.2 10E9/L (ref 0–0.7)
EOSINOPHIL NFR BLD AUTO: 2.1 %
ERYTHROCYTE [DISTWIDTH] IN BLOOD BY AUTOMATED COUNT: 12.4 % (ref 10–15)
GFR SERPL CREATININE-BSD FRML MDRD: 88 ML/MIN/{1.73_M2}
GLUCOSE SERPL-MCNC: 109 MG/DL (ref 70–99)
HCT VFR BLD AUTO: 45.2 % (ref 40–53)
HGB BLD-MCNC: 15.9 G/DL (ref 13.3–17.7)
IMM GRANULOCYTES # BLD: 0 10E9/L (ref 0–0.4)
IMM GRANULOCYTES NFR BLD: 0 %
INTERPRETATION ECG - MUSE: NORMAL
LIPASE SERPL-CCNC: 111 U/L (ref 73–393)
LYMPHOCYTES # BLD AUTO: 2.4 10E9/L (ref 0.8–5.3)
LYMPHOCYTES NFR BLD AUTO: 34.7 %
MCH RBC QN AUTO: 32.1 PG (ref 26.5–33)
MCHC RBC AUTO-ENTMCNC: 35.2 G/DL (ref 31.5–36.5)
MCV RBC AUTO: 91 FL (ref 78–100)
MONOCYTES # BLD AUTO: 0.7 10E9/L (ref 0–1.3)
MONOCYTES NFR BLD AUTO: 10.5 %
NEUTROPHILS # BLD AUTO: 3.7 10E9/L (ref 1.6–8.3)
NEUTROPHILS NFR BLD AUTO: 52.4 %
NRBC # BLD AUTO: 0 10*3/UL
NRBC BLD AUTO-RTO: 0 /100
PLATELET # BLD AUTO: 164 10E9/L (ref 150–450)
POTASSIUM SERPL-SCNC: 3.9 MMOL/L (ref 3.4–5.3)
PROT SERPL-MCNC: 8.1 G/DL (ref 6.8–8.8)
RBC # BLD AUTO: 4.96 10E12/L (ref 4.4–5.9)
SODIUM SERPL-SCNC: 137 MMOL/L (ref 133–144)
TROPONIN I SERPL-MCNC: <0.015 UG/L (ref 0–0.04)
WBC # BLD AUTO: 7 10E9/L (ref 4–11)

## 2019-10-10 PROCEDURE — 84484 ASSAY OF TROPONIN QUANT: CPT | Performed by: EMERGENCY MEDICINE

## 2019-10-10 PROCEDURE — 83690 ASSAY OF LIPASE: CPT | Performed by: EMERGENCY MEDICINE

## 2019-10-10 PROCEDURE — 71046 X-RAY EXAM CHEST 2 VIEWS: CPT

## 2019-10-10 PROCEDURE — 85379 FIBRIN DEGRADATION QUANT: CPT | Performed by: EMERGENCY MEDICINE

## 2019-10-10 PROCEDURE — 80053 COMPREHEN METABOLIC PANEL: CPT | Performed by: EMERGENCY MEDICINE

## 2019-10-10 PROCEDURE — 93005 ELECTROCARDIOGRAM TRACING: CPT

## 2019-10-10 PROCEDURE — 96374 THER/PROPH/DIAG INJ IV PUSH: CPT

## 2019-10-10 PROCEDURE — 25000128 H RX IP 250 OP 636: Performed by: EMERGENCY MEDICINE

## 2019-10-10 PROCEDURE — 99285 EMERGENCY DEPT VISIT HI MDM: CPT | Mod: 25

## 2019-10-10 PROCEDURE — 85025 COMPLETE CBC W/AUTO DIFF WBC: CPT | Performed by: EMERGENCY MEDICINE

## 2019-10-10 PROCEDURE — 25000132 ZZH RX MED GY IP 250 OP 250 PS 637: Performed by: EMERGENCY MEDICINE

## 2019-10-10 PROCEDURE — 25000125 ZZHC RX 250: Performed by: EMERGENCY MEDICINE

## 2019-10-10 RX ORDER — CYCLOBENZAPRINE HCL 10 MG
10 TABLET ORAL 3 TIMES DAILY PRN
Qty: 20 TABLET | Refills: 0 | Status: SHIPPED | OUTPATIENT
Start: 2019-10-10 | End: 2020-02-13

## 2019-10-10 RX ORDER — LORAZEPAM 2 MG/ML
1 INJECTION INTRAMUSCULAR ONCE
Status: COMPLETED | OUTPATIENT
Start: 2019-10-10 | End: 2019-10-10

## 2019-10-10 RX ADMIN — LORAZEPAM 1 MG: 2 INJECTION, SOLUTION INTRAMUSCULAR; INTRAVENOUS at 00:48

## 2019-10-10 RX ADMIN — LIDOCAINE HYDROCHLORIDE 30 ML: 20 SOLUTION ORAL; TOPICAL at 00:34

## 2019-10-10 ASSESSMENT — ENCOUNTER SYMPTOMS
BLOOD IN STOOL: 0
NECK PAIN: 0
DIFFICULTY URINATING: 0
BACK PAIN: 1
DIARRHEA: 0
VOMITING: 0
DIAPHORESIS: 1
FEVER: 0
WEAKNESS: 0
CHILLS: 0
ABDOMINAL DISTENTION: 0
CONSTIPATION: 0
SHORTNESS OF BREATH: 1
NAUSEA: 0
LIGHT-HEADEDNESS: 0

## 2019-10-10 NOTE — ED AVS SNAPSHOT
Emergency Department  [address] [facility] [address]  Phone:  [phone]  Fax:  [phone]                                    Satnam Gonzales   MRN: [id number]    Department:   Emergency Department   Date of Visit:  10/10/2019           After Visit Summary Signature Page    I have received my discharge instructions, and my questions have been answered. I have discussed any challenges I see with this plan with the nurse or doctor.    ..........................................................................................................................................  Patient/Patient Representative Signature      ..........................................................................................................................................  Patient Representative Print Name and Relationship to Patient    ..................................................               ................................................  Date                                   Time    ..........................................................................................................................................  Reviewed by Signature/Title    ...................................................              ..............................................  Date                                               Time          22EPIC Rev 08/18

## 2019-10-10 NOTE — ED PROVIDER NOTES
"  History     Chief Complaint:  Chest Pain    HPI   Satnam Gonzales is a 39 year old male who presents for evaluation of chest pain. This morning, the patient reports feeling mid-thoracic back pain, more right sided, when he woke up. This persisted throughout the day, but he was able to perform all of his normal tasks. Today, he went to bed about 2300 and just an hour later, he woke up with a squeezing mid-sternal chest tightness associated with shortness of breath, thus prompting presentation. Here, he rates the chest pain at a 6-7/10. He describes the sensation as if his \"chest were caving in\". Taking deep breaths and laying down exacerbate the pain. He also notes diaphoresis, but denies any nausea or vomiting. The pain does not radiate into his arm or jaw. He also denies any recent urinary symptoms, stool changes, hematochezia, melena, leg swelling, weakness, fevers/chills, or other symptoms. He does report a history of acid reflux, but has never experienced an episode with these symptoms. He denies any history of hiatal hernia or esophageal spasm.     He has no personal history of heart disease. He notes a family history of coronary artery disease in his grandfather. He denies a personal or familial history of blood clots.     Allergies:  NKDA    Medications:    Azathioprine  Zyrtec  Protonix     Past Medical History:    Depression  Eosinophilic esophagitis  Chronic rhinitis  Autoimmune hepatitis     Past Surgical History:    Endoscopy     Family History:    CAD - Grandfather at age 50    Social History:  Marital Status:   [2]  Wife, Jackelin, at bedside.   Former smoker.   Former chewing tobacco user.   Negative for alcohol use.   Negative for drug use.      Review of Systems   Constitutional: Positive for diaphoresis. Negative for chills and fever.   Respiratory: Positive for shortness of breath.    Cardiovascular: Positive for chest pain. Negative for leg swelling.   Gastrointestinal: Negative for abdominal " "distention, blood in stool, constipation, diarrhea, nausea and vomiting.   Genitourinary: Negative for difficulty urinating.   Musculoskeletal: Positive for back pain. Negative for neck pain.   Neurological: Negative for weakness and light-headedness.   All other systems reviewed and are negative.        Physical Exam     Patient Vitals for the past 24 hrs:   BP Temp Temp src Heart Rate Resp SpO2 Height   10/10/19 0021 (!) 160/91 98.4  F (36.9  C) Oral 86 18 100 % 1.753 m (5' 9\")      Physical Exam  Constitutional: Well developed, nontox appearance, appears uncomfortable  Head: Atraumatic.   Mouth/Throat: Oropharynx is clear and moist.   Neck:  no stridor  Eyes: no scleral icterus  Cardiovascular: RRR, 2+ bilat radial, PT, DP pulses  Pulmonary/Chest: nml resp effort, Clear BS bilat, chest NT  Abdominal: ND, +BS, soft, NT, no rebound or guarding   : no CVA tenderness bilat  Ext: Warm, well perfused, no edema  Neurological: A&O, symmetric facies, moves ext x4, 5/5 strength to bilat LEs, sensation grossly intact  Skin: Skin is warm and dry.   Psychiatric: Behavior is normal. Thought content normal.   Nursing note and vitals reviewed.    Emergency Department Course   ECG:  Indication: Chest Pain  Time: 0023  Vent. Rate 85 bpm. NM interval 138. QRS duration 104. QT/QTc 380/452. P-R-T axis 58 28 53.    Normal sinus rhythm  Incomplete RBBB  Borderline ECG. No significant change compared to EKG dated 7/31/17. Read time: 0023.    Imaging:  Radiographic findings were communicated with the patient who voiced understanding of the findings.  XR Chest 2 views:   No acute abnormality, as per radiology.      Laboratory:  CBC: WBC: 7.0, HGB: 15.9, PLT: 164  CMP: Glucose 109 (H), Anion gap: 2 (L), o/w WNL (Creatinine: 1.05)  0043 Troponin: <0.015    D dimer: 0.3  Lipase: 111    Interventions:  0034 GI Cocktail (Maalox/Mylanta and viscous Lidocaine), 30 mL suspension, PO    0048 Ativan, 1 mg, IV injection    Emergency Department " Course:  Nursing notes and vitals reviewed.   0031: I performed an exam of the patient as documented above.      IV was inserted and blood was drawn for laboratory testing, results above.   The patient was sent for a chest x-ray while in the emergency department, results above.    EKG obtained in the ED, see results above.    Medicine administered as documented above.     0135: I rechecked the patient and discussed the results of his workup thus far. He felt relief with a GI cocktail.     Findings and plan explained to the Patient. Patient discharged home with instructions regarding supportive care, medications, and reasons to return. The importance of close follow-up was reviewed. The patient was prescribed Flexeril and viscous lidocaine 2% mixed with Maalox.      I personally reviewed the laboratory and imaging results with the Patient and answered all related questions prior to discharge.       Impression & Plan      Medical Decision Makin year old male presenting w/ chest discomfort     DDx includes thoracic radiculopathy, esophogeal spasm, hiatal hernia, PE, dissection, ACS, unstable angina, chest wall pain, GERD, pneumothorax, pneumonia.  EKG interpretation as noted above sig for no acute ischemic findings.  Labs and imaging ordered as noted above.  Labs unremarkable including neg trop and d dimer.  Imaging sig for no acute cardiopulmonary disease.  Interventions as noted above with improvement in symptoms.  Given patient's risk factors and description of symptoms (Heart score 0), I don't think for cardiac evaluation is indicated at this time.  At this time I feel the patient is safe for discharge.  Possible GI etiology such as GERD (doubt surgical etiology or hollow viscus perforation) or thoracic radiculopathy.  Recommendations given regarding follow up with PCP and return to the emergency department as needed for new or worsening symptoms.  Pt counseled on all results, diagnosis and disposition.  They  are understanding and agreeable to plan. Patient discharged in stable condition.     Diagnosis:    ICD-10-CM    1. Epigastric discomfort R10.13    2. Chest discomfort R07.89        Disposition:  discharged to home    Discharge Medications:  New Prescriptions    COMPOUNDED NON-CONTROLLED SUBSTANCE (CMPD RX) - PHARMACY TO MIX COMPOUNDED MEDICATION    Please compound viscous lidocaine 2% and maalox in a 1:1 ratio    Please take 15 to 30 ml by mouth every 4-6 hours as needed for abdominal pain    CYCLOBENZAPRINE (FLEXERIL) 10 MG TABLET    Take 1 tablet (10 mg) by mouth 3 times daily as needed for muscle spasms     Scribe Disclosure:  Ashlee GILLETTE, am serving as a scribe on 10/10/2019 at 12:31 AM to personally document services performed by Dick Burgos MD based on my observations and the provider's statements to me.      10/10/2019    EMERGENCY DEPARTMENT       Dick Burgos MD  10/10/19 0159

## 2019-10-10 NOTE — DISCHARGE INSTRUCTIONS
Please follow-up with your primary care provider on Friday for persistent symptoms.    Please return to the ED as needed for new or worsening symptoms such as

## 2019-11-05 DIAGNOSIS — K75.4 AUTOIMMUNE HEPATITIS (H): Primary | ICD-10-CM

## 2019-11-21 ENCOUNTER — TELEPHONE (OUTPATIENT)
Dept: GASTROENTEROLOGY | Facility: CLINIC | Age: 39
End: 2019-11-21

## 2019-11-21 NOTE — TELEPHONE ENCOUNTER
Left message for pt reminding them of upcoming appointment.  Instructed pt to bring updated medications list.  Carmen Fitch on 11/21/2019 at 12:14 PM

## 2019-11-22 ENCOUNTER — OFFICE VISIT (OUTPATIENT)
Dept: GASTROENTEROLOGY | Facility: CLINIC | Age: 39
End: 2019-11-22
Attending: PHYSICIAN ASSISTANT
Payer: COMMERCIAL

## 2019-11-22 VITALS
DIASTOLIC BLOOD PRESSURE: 79 MMHG | BODY MASS INDEX: 27.84 KG/M2 | OXYGEN SATURATION: 99 % | SYSTOLIC BLOOD PRESSURE: 120 MMHG | TEMPERATURE: 97.6 F | WEIGHT: 188.5 LBS | HEART RATE: 59 BPM

## 2019-11-22 DIAGNOSIS — K75.4 AUTOIMMUNE HEPATITIS (H): ICD-10-CM

## 2019-11-22 DIAGNOSIS — Z23 NEED FOR IMMUNIZATION AGAINST INFLUENZA: Primary | ICD-10-CM

## 2019-11-22 LAB
ALBUMIN SERPL-MCNC: 4.4 G/DL (ref 3.4–5)
ALP SERPL-CCNC: 93 U/L (ref 40–150)
ALT SERPL W P-5'-P-CCNC: 47 U/L (ref 0–70)
ANION GAP SERPL CALCULATED.3IONS-SCNC: 2 MMOL/L (ref 3–14)
AST SERPL W P-5'-P-CCNC: 26 U/L (ref 0–45)
BILIRUB DIRECT SERPL-MCNC: 0.2 MG/DL (ref 0–0.2)
BILIRUB SERPL-MCNC: 0.9 MG/DL (ref 0.2–1.3)
BUN SERPL-MCNC: 17 MG/DL (ref 7–30)
CALCIUM SERPL-MCNC: 9.6 MG/DL (ref 8.5–10.1)
CHLORIDE SERPL-SCNC: 102 MMOL/L (ref 94–109)
CO2 SERPL-SCNC: 32 MMOL/L (ref 20–32)
CREAT SERPL-MCNC: 1.15 MG/DL (ref 0.66–1.25)
ERYTHROCYTE [DISTWIDTH] IN BLOOD BY AUTOMATED COUNT: 12.2 % (ref 10–15)
GFR SERPL CREATININE-BSD FRML MDRD: 79 ML/MIN/{1.73_M2}
GLUCOSE SERPL-MCNC: 85 MG/DL (ref 70–99)
HCT VFR BLD AUTO: 46.9 % (ref 40–53)
HGB BLD-MCNC: 16.1 G/DL (ref 13.3–17.7)
IGG SERPL-MCNC: 980 MG/DL (ref 695–1620)
INR PPP: 1.11 (ref 0.86–1.14)
MCH RBC QN AUTO: 31.4 PG (ref 26.5–33)
MCHC RBC AUTO-ENTMCNC: 34.3 G/DL (ref 31.5–36.5)
MCV RBC AUTO: 92 FL (ref 78–100)
PLATELET # BLD AUTO: 181 10E9/L (ref 150–450)
POTASSIUM SERPL-SCNC: 4.4 MMOL/L (ref 3.4–5.3)
PROT SERPL-MCNC: 7.5 G/DL (ref 6.8–8.8)
RBC # BLD AUTO: 5.12 10E12/L (ref 4.4–5.9)
SODIUM SERPL-SCNC: 136 MMOL/L (ref 133–144)
WBC # BLD AUTO: 5.8 10E9/L (ref 4–11)

## 2019-11-22 PROCEDURE — 25000128 H RX IP 250 OP 636: Mod: ZF | Performed by: PHYSICIAN ASSISTANT

## 2019-11-22 PROCEDURE — 80048 BASIC METABOLIC PNL TOTAL CA: CPT | Performed by: PHYSICIAN ASSISTANT

## 2019-11-22 PROCEDURE — 85027 COMPLETE CBC AUTOMATED: CPT | Performed by: PHYSICIAN ASSISTANT

## 2019-11-22 PROCEDURE — G0463 HOSPITAL OUTPT CLINIC VISIT: HCPCS | Mod: 25,ZF

## 2019-11-22 PROCEDURE — 85610 PROTHROMBIN TIME: CPT | Performed by: PHYSICIAN ASSISTANT

## 2019-11-22 PROCEDURE — 90686 IIV4 VACC NO PRSV 0.5 ML IM: CPT | Mod: ZF | Performed by: PHYSICIAN ASSISTANT

## 2019-11-22 PROCEDURE — 80076 HEPATIC FUNCTION PANEL: CPT | Performed by: PHYSICIAN ASSISTANT

## 2019-11-22 PROCEDURE — 36415 COLL VENOUS BLD VENIPUNCTURE: CPT | Performed by: PHYSICIAN ASSISTANT

## 2019-11-22 PROCEDURE — 82784 ASSAY IGA/IGD/IGG/IGM EACH: CPT | Performed by: PHYSICIAN ASSISTANT

## 2019-11-22 PROCEDURE — G0008 ADMIN INFLUENZA VIRUS VAC: HCPCS | Mod: ZF

## 2019-11-22 RX ADMIN — INFLUENZA A VIRUS A/BRISBANE/02/2018 IVR-190 (H1N1) ANTIGEN (FORMALDEHYDE INACTIVATED), INFLUENZA A VIRUS A/KANSAS/14/2017 X-327 (H3N2) ANTIGEN (FORMALDEHYDE INACTIVATED), INFLUENZA B VIRUS B/PHUKET/3073/2013 ANTIGEN (FORMALDEHYDE INACTIVATED), AND INFLUENZA B VIRUS B/MARYLAND/15/2016 BX-69A ANTIGEN (FORMALDEHYDE INACTIVATED) 0.5 ML: 15; 15; 15; 15 INJECTION, SUSPENSION INTRAMUSCULAR at 11:27

## 2019-11-22 ASSESSMENT — PAIN SCALES - GENERAL: PAINLEVEL: NO PAIN (0)

## 2019-11-22 NOTE — NURSING NOTE
Chief Complaint   Patient presents with     RECHECK     F/U for liver enzyme elevation     Blood pressure 120/79, pulse 59, temperature 97.6  F (36.4  C), temperature source Oral, weight 85.5 kg (188 lb 8 oz), SpO2 99 %.    Evelina Clayton, CMA

## 2019-11-22 NOTE — PROGRESS NOTES
Hepatology Follow-up Clinic note  Satnam Gonzales   Date of Birth 1980  Date of Service 11/22/2019    Reason for follow-up: [facility]          Assessment/plan:   Satnam Gonzales is a 39 year old male with autoimmune hepatitis maintained on 75 mg Azathioprine. His transaminases are normal today. He otherwise has normal liver function. He has good compliance with medication. Liver biopsy on 11/1/2018 showing Stage 2 Fibrosis.     - Continue 75 mg Azathioprine   - IgG is pending    -Follow-up in clinic in six months    Nae Do PA-C   [facility] Hepatology clinic    -----------------------------------------------------       HPI:   Satnam Gonzales is a 39 year old male presenting for follow-up.     Autoimmune Hepatitis   Liver Biopsy on 11/1/2018: mildly active hepatitis with chronic features  Stage 2 fibrosis   -F-actin - 56   Treatment:   - Prednisone taper started 11/2018, stayed on 2.5 mg since February 2019  - Azathioprine started 11/2018, increased to 75 mg in January 5, 2019    Patient was last seen by me on 4/30/2019. He was in the ER for chest pain. Cardiac work-up was negative. Pain was thought to be spasms. He took flexeril for a few days. He has a good compliance with Azathioprine.     His appetite is normal. His weight is stable. He is having regular bowel movements. Patient denies jaundice, lower extremity edema, abdominal distension or confusion.  Patient also denies melena, hematochezia or hematemesis.    Patient denies weight loss, fevers, sweats or chills.    He does not drink alcohol. 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     Medical hx Surgical hx   Past Medical History:   Diagnosis Date     Autoimmune hepatitis (H)      Depression      Eosinophilic esophagitis     Past Surgical History:   Procedure Laterality Date     NO HISTORY OF SURGERY                   Medications:     Current Outpatient Medications   Medication     azaTHIOprine 75 MG TABS     Cetirizine  HCl (ZYRTEC PO)     multivitamin, therapeutic with minerals (MULTI-VITAMIN) TABS tablet     pantoprazole (PROTONIX) 40 MG EC tablet     COMPOUNDED NON-CONTROLLED SUBSTANCE (CMPD RX) - PHARMACY TO MIX COMPOUNDED MEDICATION     No current facility-administered medications for this visit.             Allergies:     Allergies   Allergen Reactions     Seasonal Allergies             Review of Systems:   10 points ROS was obtained and highlighted in the HPI, otherwise negative.          Physical Exam:   VS:  /79   Pulse 59   Temp 97.6  F (36.4  C) (Oral)   Wt 85.5 kg (188 lb 8 oz)   SpO2 99%   BMI 27.84 kg/m        Gen: A&Ox3, NAD, well developed  HEENT: non-icteric   CV: RRR, no overt murmurs  Lung: CTA Bilatererally, no wheezing or crackles.   Lym- no palpable lymphadenopathy  Abd: soft, NT, ND, no organomegaly.   Ext: no edema, intact pulses.   Skin: No rash, no palmar erythema, telangiectasias or jaundice  Neuro: grossly intact, no asterixis   Psych: appropriate mood and affects           Data:   Reviewed in person and significant for:    Lab Results   Component Value Date     10/10/2019      Lab Results   Component Value Date    POTASSIUM 3.9 10/10/2019     Lab Results   Component Value Date    CHLORIDE 103 10/10/2019     Lab Results   Component Value Date    CO2 32 10/10/2019     Lab Results   Component Value Date    BUN 13 10/10/2019     Lab Results   Component Value Date    CR 1.05 10/10/2019       Lab Results   Component Value Date    WBC 7.0 10/10/2019     Lab Results   Component Value Date    HGB 15.9 10/10/2019     Lab Results   Component Value Date    HCT 45.2 10/10/2019     Lab Results   Component Value Date    MCV 91 10/10/2019     Lab Results   Component Value Date     10/10/2019       Lab Results   Component Value Date    AST 31 10/10/2019     Lab Results   Component Value Date    ALT 54 10/10/2019     No results found for: BILICONJ   Lab Results   Component Value Date    BILITOTAL  0.6 10/10/2019       Lab Results   Component Value Date    ALBUMIN 4.5 10/10/2019     Lab Results   Component Value Date    PROTTOTAL 8.1 10/10/2019      Lab Results   Component Value Date    ALKPHOS 106 10/10/2019       Lab Results   Component Value Date    INR 1.18 04/30/2019

## 2019-11-22 NOTE — LETTER
11/22/2019      RE: Satnam Gonzales  [id number]  [facility] [id number]       Hepatology Follow-up Clinic note  Satnam Gonzales   Date of Birth 1980  Date of Service 11/22/2019    [facility]up: [facility]          Assessment/plan:   Satnam Gonzales is a 39 year old male with autoimmune hepatitis maintained on 75 mg Azathioprine. His transaminases are normal today. He otherwise has normal liver function. He has good compliance with medication. Liver biopsy on 11/1/2018 showing Stage 2 Fibrosis.     - Continue 75 mg Azathioprine   - IgG is pending    -Follow-up in clinic in six months    Nae oD PA-C   [facility] Hepatology clinic    -----------------------------------------------------       HPI:   Satnam Gonzales is a 39 year old male presenting for follow-up.     Autoimmune Hepatitis   Liver Biopsy on 11/1/2018: mildly active hepatitis with chronic features  Stage 2 fibrosis   -F-actin - 56   Treatment:   - Prednisone taper started 11/2018, stayed on 2.5 mg since February 2019  - Azathioprine started 11/2018, increased to 75 mg in January 5, 2019    Patient was last seen by me on 4/30/2019. He was in the ER for chest pain. Cardiac work-up was negative. Pain was thought to be spasms. He took flexeril for a few days. He has a good compliance with Azathioprine.     His appetite is normal. His weight is stable. He is having regular bowel movements. Patient denies jaundice, lower extremity edema, abdominal distension or confusion.  Patient also denies melena, hematochezia or hematemesis.    Patient denies weight loss, fevers, sweats or chills.    He does not drink alcohol. 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     Medical hx Surgical hx   Past Medical History:   Diagnosis Date     Autoimmune hepatitis (H)      Depression      Eosinophilic esophagitis     Past Surgical History:   Procedure Laterality Date     NO HISTORY OF SURGERY                   Medications:      Current Outpatient Medications   Medication     azaTHIOprine 75 MG TABS     Cetirizine HCl (ZYRTEC PO)     multivitamin, therapeutic with minerals (MULTI-VITAMIN) TABS tablet     pantoprazole (PROTONIX) 40 MG EC tablet     COMPOUNDED NON-CONTROLLED SUBSTANCE (CMPD RX) - PHARMACY TO MIX COMPOUNDED MEDICATION     No current facility-administered medications for this visit.             Allergies:     Allergies   Allergen Reactions     Seasonal Allergies             Review of Systems:   10 points ROS was obtained and highlighted in the HPI, otherwise negative.          Physical Exam:   VS:  /79   Pulse 59   Temp 97.6  F (36.4  C) (Oral)   Wt 85.5 kg (188 lb 8 oz)   SpO2 99%   BMI 27.84 kg/m         Gen: A&Ox3, NAD, well developed  HEENT: non-icteric   CV: RRR, no overt murmurs  Lung: CTA Bilatererally, no wheezing or crackles.   Lym- no palpable lymphadenopathy  Abd: soft, NT, ND, no organomegaly.   Ext: no edema, intact pulses.   Skin: No rash, no palmar erythema, telangiectasias or jaundice  Neuro: grossly intact, no asterixis   Psych: appropriate mood and affects           Data:   Reviewed in person and significant for:    Lab Results   Component Value Date     10/10/2019      Lab Results   Component Value Date    POTASSIUM 3.9 10/10/2019     Lab Results   Component Value Date    CHLORIDE 103 10/10/2019     Lab Results   Component Value Date    CO2 32 10/10/2019     Lab Results   Component Value Date    BUN 13 10/10/2019     Lab Results   Component Value Date    CR 1.05 10/10/2019       Lab Results   Component Value Date    WBC 7.0 10/10/2019     Lab Results   Component Value Date    HGB 15.9 10/10/2019     Lab Results   Component Value Date    HCT 45.2 10/10/2019     Lab Results   Component Value Date    MCV 91 10/10/2019     Lab Results   Component Value Date     10/10/2019       Lab Results   Component Value Date    AST 31 10/10/2019     Lab Results   Component Value Date    ALT 54  10/10/2019     No results found for: BILICONJ   Lab Results   Component Value Date    BILITOTAL 0.6 10/10/2019       Lab Results   Component Value Date    ALBUMIN 4.5 10/10/2019     Lab Results   Component Value Date    PROTTOTAL 8.1 10/10/2019      Lab Results   Component Value Date    ALKPHOS 106 10/10/2019       Lab Results   Component Value Date    INR 1.18 04/30/2019       Nae Do PA-C

## 2020-01-10 DIAGNOSIS — K20.0 EOSINOPHILIC ESOPHAGITIS: ICD-10-CM

## 2020-01-10 RX ORDER — PANTOPRAZOLE SODIUM 40 MG/1
40 TABLET, DELAYED RELEASE ORAL DAILY
Qty: 30 TABLET | Refills: 0 | Status: SHIPPED | OUTPATIENT
Start: 2020-01-10 | End: 2020-02-13

## 2020-01-10 NOTE — TELEPHONE ENCOUNTER
"pantoprazole (PROTONIX) 40 MG EC tablet    Last Written Prescription Date:  02/20/2019  Last Fill Quantity: 30,  # refills: 10   Last office visit: 10/15/2018 with prescribing provider:  Christofer   Future Office Visit:  Unknown     Requested Prescriptions   Pending Prescriptions Disp Refills     pantoprazole (PROTONIX) 40 MG EC tablet [Pharmacy Med Name: PANTOPRAZOLE SOD DR 40 MG TAB] 90 tablet 3     Sig: TAKE 1 TABLET (40 MG) BY MOUTH DAILY TAKE 30-60 MINUTES BEFORE A MEAL.       PPI Protocol Failed - 1/10/2020  2:03 AM        Failed - Recent (12 mo) or future (30 days) visit within the authorizing provider's specialty     Patient has had an office visit with the authorizing provider or a provider within the authorizing providers department within the previous 12 mos or has a future within next 30 days. See \"Patient Info\" tab in inbasket, or \"Choose Columns\" in Meds & Orders section of the refill encounter.              Passed - Not on Clopidogrel (unless Pantoprazole ordered)        Passed - No diagnosis of osteoporosis on record        Passed - Medication is active on med list        Passed - Patient is age 18 or older          "

## 2020-01-10 NOTE — TELEPHONE ENCOUNTER
[employer] message sent advising pt due for OV     Medication is being filled for 1 time refill only due to:  Patient needs to be seen because it has been more than one year since last visit.    Anusha VANEGAS RN

## 2020-01-30 ENCOUNTER — MYC MEDICAL ADVICE (OUTPATIENT)
Dept: GASTROENTEROLOGY | Facility: CLINIC | Age: 40
End: 2020-01-30

## 2020-01-30 DIAGNOSIS — K75.4 AUTOIMMUNE HEPATITIS (H): ICD-10-CM

## 2020-01-30 RX ORDER — AZATHIOPRINE 75 MG/1
75 TABLET ORAL DAILY
Qty: 30 TABLET | Refills: 11 | Status: SHIPPED | OUTPATIENT
Start: 2020-01-30 | End: 2021-01-20

## 2020-02-08 DIAGNOSIS — K20.0 EOSINOPHILIC ESOPHAGITIS: ICD-10-CM

## 2020-02-08 NOTE — TELEPHONE ENCOUNTER
"Last Written Prescription Date:  1/10/20  Last Fill Quantity: 30 tablet,  # refills: 0   Last office visit: 10/15/2018 with prescribing provider:  Christofer   Future Office Visit:      Requested Prescriptions   Pending Prescriptions Disp Refills     pantoprazole (PROTONIX) 40 MG EC tablet [Pharmacy Med Name: PANTOPRAZOLE SOD DR 40 MG TAB] 30 tablet 0     Sig: TAKE 1 TABLET (40 MG) BY MOUTH DAILY TAKE 30-60 MINUTES BEFORE A MEAL.       PPI Protocol Failed - 2/8/2020  9:33 AM        Failed - Recent (12 mo) or future (30 days) visit within the authorizing provider's specialty     Patient has had an office visit with the authorizing provider or a provider within the authorizing providers department within the previous 12 mos or has a future within next 30 days. See \"Patient Info\" tab in inbasket, or \"Choose Columns\" in Meds & Orders section of the refill encounter.              Passed - Not on Clopidogrel (unless Pantoprazole ordered)        Passed - No diagnosis of osteoporosis on record        Passed - Medication is active on med list        Passed - Patient is age 18 or older          "

## 2020-02-10 RX ORDER — PANTOPRAZOLE SODIUM 40 MG/1
40 TABLET, DELAYED RELEASE ORAL DAILY
Qty: 30 TABLET | Refills: 0 | OUTPATIENT
Start: 2020-02-10

## 2020-02-10 NOTE — TELEPHONE ENCOUNTER
Routing refill request to provider for review/approval because:  Any given x1 and patient did not follow up, please advise  Patient needs to be seen because it has been more than 1 year since last office visit.      TEJ TitusN, RN  [employer] Triage

## 2020-02-13 ENCOUNTER — APPOINTMENT (OUTPATIENT)
Dept: LAB | Facility: CLINIC | Age: 40
End: 2020-02-13
Payer: COMMERCIAL

## 2020-02-13 ENCOUNTER — OFFICE VISIT (OUTPATIENT)
Dept: FAMILY MEDICINE | Facility: CLINIC | Age: 40
End: 2020-02-13
Payer: COMMERCIAL

## 2020-02-13 VITALS
OXYGEN SATURATION: 99 % | HEART RATE: 50 BPM | HEIGHT: 69 IN | WEIGHT: 187 LBS | BODY MASS INDEX: 27.7 KG/M2 | TEMPERATURE: 97.1 F | DIASTOLIC BLOOD PRESSURE: 79 MMHG | SYSTOLIC BLOOD PRESSURE: 117 MMHG

## 2020-02-13 DIAGNOSIS — K75.4 AUTOIMMUNE HEPATITIS (H): ICD-10-CM

## 2020-02-13 DIAGNOSIS — J30.2 SEASONAL ALLERGIC RHINITIS, UNSPECIFIED TRIGGER: ICD-10-CM

## 2020-02-13 DIAGNOSIS — K20.0 EOSINOPHILIC ESOPHAGITIS: Primary | ICD-10-CM

## 2020-02-13 LAB
ERYTHROCYTE [DISTWIDTH] IN BLOOD BY AUTOMATED COUNT: 12.5 % (ref 10–15)
HCT VFR BLD AUTO: 44.3 % (ref 40–53)
HGB BLD-MCNC: 15.1 G/DL (ref 13.3–17.7)
MCH RBC QN AUTO: 32.1 PG (ref 26.5–33)
MCHC RBC AUTO-ENTMCNC: 34.1 G/DL (ref 31.5–36.5)
MCV RBC AUTO: 94 FL (ref 78–100)
PLATELET # BLD AUTO: 177 10E9/L (ref 150–450)
RBC # BLD AUTO: 4.71 10E12/L (ref 4.4–5.9)
WBC # BLD AUTO: 4.3 10E9/L (ref 4–11)

## 2020-02-13 PROCEDURE — 85027 COMPLETE CBC AUTOMATED: CPT | Performed by: PHYSICIAN ASSISTANT

## 2020-02-13 PROCEDURE — 99214 OFFICE O/P EST MOD 30 MIN: CPT | Performed by: INTERNAL MEDICINE

## 2020-02-13 PROCEDURE — 36415 COLL VENOUS BLD VENIPUNCTURE: CPT | Performed by: PHYSICIAN ASSISTANT

## 2020-02-13 RX ORDER — PANTOPRAZOLE SODIUM 40 MG/1
40 TABLET, DELAYED RELEASE ORAL DAILY
Qty: 90 TABLET | Refills: 3 | Status: SHIPPED | OUTPATIENT
Start: 2020-02-13 | End: 2021-02-04

## 2020-02-13 RX ORDER — CETIRIZINE HYDROCHLORIDE 10 MG/1
10 TABLET, CHEWABLE ORAL DAILY
Qty: 90 TABLET | Refills: 3 | Status: SHIPPED | OUTPATIENT
Start: 2020-02-13 | End: 2024-04-18

## 2020-02-13 ASSESSMENT — MIFFLIN-ST. JEOR: SCORE: 1748.61

## 2020-02-13 NOTE — PROGRESS NOTES
Chief Complaint:       Satnam Gonzales is a 39 year old male who presents to clinic today for the following health issues:    Follow up on Eosinophilic esophagitis    Medication Followup of pantoprazole    Taking Medication as prescribed: yes    Side Effects:  None    Medication Helping Symptoms:  yes       HPI:   Patient Satnam Gonzales is a very pleasant 39 year old male with history of allergic rhinitis who presents to Internal Medicine clinic today for follow up of multiple concerns including chronic eosinophilic esophagitis. Regarding the patient's chronic GERD symptoms due to eosinophilic esophagitis, the patient reports that his eosinophilic esophagitis symptoms are stable on current Protonix medication and he is due for a reifll of his GERD medication with Protonix at this time. No chest pain, headaches, fever or chills.        Current Medications:     Current Outpatient Medications   Medication Sig Dispense Refill     azaTHIOprine 75 MG TABS Take 75 mg by mouth daily 30 tablet 11     cetirizine (ZYRTEC) 10 MG CHEW Take 1 tablet (10 mg) by mouth daily 90 tablet 3     multivitamin, therapeutic with minerals (MULTI-VITAMIN) TABS tablet Take 1 tablet by mouth daily       pantoprazole (PROTONIX) 40 MG EC tablet Take 1 tablet (40 mg) by mouth daily Take 30-60 minutes before a meal. 90 tablet 3         Allergies:      Allergies   Allergen Reactions     Seasonal Allergies             Past Medical History:     Past Medical History:   Diagnosis Date     Autoimmune hepatitis (H)      Depression      Eosinophilic esophagitis          Past Surgical History:     Past Surgical History:   Procedure Laterality Date     NO HISTORY OF SURGERY           Family Medical History:     Family History   Problem Relation Age of Onset     C.A.D. Paternal Grandfather 50     Alzheimer Disease Maternal Grandfather 73     Food Allergy Father      Seasonal/Environmental Allergies Brother          Social History:     Social History      Socioeconomic History     Marital status:      Spouse name: Not on file     Number of children: Not on file     Years of education: Not on file     Highest education level: Not on file   Occupational History     Not on file   Social Needs     Financial resource strain: Not on file     Food insecurity:     Worry: Not on file     Inability: Not on file     Transportation needs:     Medical: Not on file     Non-medical: Not on file   Tobacco Use     Smoking status: Former Smoker     Packs/day: 1.00     Years: 10.00     Pack years: 10.00     Smokeless tobacco: Former User     Types: Chew   Substance and Sexual Activity     Alcohol use: No     Drug use: No     Sexual activity: Yes     Partners: Female   Lifestyle     Physical activity:     Days per week: Not on file     Minutes per session: Not on file     Stress: Not on file   Relationships     Social connections:     Talks on phone: Not on file     Gets together: Not on file     Attends Voodoo service: Not on file     Active member of club or organization: Not on file     Attends meetings of clubs or organizations: Not on file     Relationship status: Not on file     Intimate partner violence:     Fear of current or ex partner: Not on file     Emotionally abused: Not on file     Physically abused: Not on file     Forced sexual activity: Not on file   Other Topics Concern     Parent/sibling w/ CABG, MI or angioplasty before 65F 55M? Not Asked   Social History Narrative     Not on file           Review of System:     Constitutional: Negative for fever or chills  Skin: Negative for rashes  Ears/Nose/Throat: positive for chronic allergic rhinitis  Respiratory: No shortness of breath, dyspnea on exertion, cough, or hemoptysis  Cardiovascular: Negative for chest pain  Gastrointestinal: positive for chronic eosinophilic esophagitis and autoimmune hepatitis  Genitourinary: Negative for dysuria, hematuria  Musculoskeletal: Negative for myalgias  Neurologic:  "Negative for headaches  Psychiatric: Negative for depression, anxiety  Hematologic/Lymphatic/Immunologic: Negative  Endocrine: Negative  Behavioral: Negative for tobacco use       Physical Exam:   /79 (BP Location: Left arm, Patient Position: Sitting, Cuff Size: Adult Regular)   Pulse 50   Temp 97.1  F (36.2  C) (Tympanic)   Ht 1.753 m (5' 9\")   Wt 84.8 kg (187 lb)   SpO2 99%   BMI 27.62 kg/m      GENERAL: alert and no distress  EYES: eyes grossly normal to inspection, and conjunctivae and sclerae normal  HENT: Normocephalic atraumatic. Nose and mouth without ulcers or lesions  NECK: supple  RESP: lungs clear to auscultation   CV: regular rate and rhythm, normal S1 S2  LYMPH: no peripheral edema   ABDOMEN: nondistended  MS: no gross musculoskeletal defects noted  SKIN: no suspicious lesions or rashes  NEURO: Alert & Oriented x 3.   PSYCH: mentation appears normal, affect normal        Diagnostic Test Results:       Liver Function Studies -   Recent Labs   Lab Test 11/22/19  1019   PROTTOTAL 7.5   ALBUMIN 4.4   BILITOTAL 0.9   ALKPHOS 93   AST 26   ALT 47         ASSESSMENT/PLAN:       (K20.0) Eosinophilic esophagitis  (primary encounter diagnosis)  Comment: stable. Patient is due for a refill of Protonix.  Plan: pantoprazole (PROTONIX) 40 MG EC tablet,         GASTROENTEROLOGY ADULT REF CONSULT ONLY      (J30.2) Seasonal allergic rhinitis, unspecified trigger  Comment: stable. Patient is due for a refill of Zyrtec medication  Plan: cetirizine (ZYRTEC) 10 MG CHEW      (K75.4) Autoimmune hepatitis (H)  Comment: patient's currently followed by hepatology clinic at the [facility]  Plan: continue current azathioprine medication and outpatient hepatology clinic follow up going forward.        Follow Up Plan:     Patient is instructed to return to Internal Medicine clinic for follow-up visit in 3 months.        Chel Beaulieu MD  Internal Medicine  [facility]    "

## 2020-03-01 ENCOUNTER — HEALTH MAINTENANCE LETTER (OUTPATIENT)
Age: 40
End: 2020-03-01

## 2020-04-17 NOTE — TELEPHONE ENCOUNTER
RECORDS RECEIVED FROM: Internal referal    DATE RECEIVED: 4/23/20 8:30AM   NOTES STATUS DETAILS   OFFICE NOTE from referring provider Internal OV note Christofer 2/13/20   OFFICE NOTE from other specialist N/A    DISCHARGE SUMMARY from hospital N/A    OPERATIVE REPORT N/A    MEDICATION LIST Internal         ENDOSCOPY  Received 8/19/17 - [facility] EGD      REFERRAL INFORMATION    Date referral was placed: 4/23/20   Date all records received: N/A   Date records were scanned into Epic: N/A   Date records were sent to Provider to review: N/A   Date and recommendation received from provider:  LETTER SENT  SCHEDULE APPOINTMENT   Date patient was contacted to schedule: 4/17/20

## 2020-04-22 NOTE — PROGRESS NOTES
"Satnam Gonzales is a 40 year old male who is being evaluated via a billable video visit.      The patient has been notified of following:     \"This video visit will be conducted via a call between you and your physician/provider. We have found that certain health care needs can be provided without the need for an in-person physical exam.  This service lets us provide the care you need with a video conversation.  If a prescription is necessary we can send it directly to your pharmacy.  If lab work is needed we can place an order for that and you can then stop by our lab to have the test done at a later time.    Video visits are billed at different rates depending on your insurance coverage.  Please reach out to your insurance provider with any questions.    If during the course of the call the physician/provider feels a video visit is not appropriate, you will not be charged for this service.\"    Patient has given verbal consent for Video visit? Yes    How would you like to obtain your AVS? [facility]    Patient would like the video invitation sent by: Text to cell phone: [phone]    Will anyone else be joining your video visit? No      Referring provider: Dr Chel Beaulieu    CC: 41 yo man who was referred to GI clinic by Dr Chel Beaulieu for evaluation of eosinophilic esophagitis.     HPI:  40-year-old man with history of eosinophilic esophagitis and autoimmune hepatitis with stage II fibrosis 11/1/2018 on 75 mg azathioprine who presents for evaluation of eosinophilic esophagitis.    He had a history of solid food dysphagia to meats and was diagnosed with eosinophilic esophagitis in 2017.  He was started on pantoprazole 40 mg once per day.  The heartburn he was having improved while on pantoprazole.  I do not have the second endoscopy report, but he states he underwent repeat endoscopy and thinks the pantoprazole had benefit.  Since starting pantoprazole, and his dysphagia has been resolved.  He has no troubles eating or " drinking.  He does not have asthma.  He has seasonal allergies, no other food or medication allergies.  He takes NSAIDs approximately twice per month.  He underwent food allergy testing which was inconclusive.    He has had 2 episodes of pressure-like chest pain which he describes feeling like he might have pulled a muscle.  He was evaluated in the emergency room in October 2019.  EKG and troponin were normal.  He had a repeat episode within the last few weeks which lasted for approximately 5 days.  He thinks that it is a little more difficult to swallow during these episodes.  He notices more pain if he stretches his chest.  It is nonexertional.  He is still able to eat and drink without any other issues.  He is wondering if this is due to esophageal spasm.    We spent much of the appointment discussing pathology/etiology of eosinophilic esophagitis and the benefits, risks, and expectations of treatment algorithm including PPI, elimination diet, and topical steroids.    Past medical history:  Autoimmune hepatitis  GERD  Eosinophilic esophagitis    Past surgical history:  No abdominal surgeries.     Social history:  No alcohol use.  Non-smoker.  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    Family history:  Father with egg allergy.  Brother with egg and milk allergy.  No esophageal pathology.    Medications:  Azathioprine 75 mg  Cetirizine  Multivitamin  Pantoprazole 40 mg No abdominal surgeir    Physical exam:  No vitals taken.  GEN: No acute distress  Eyes: EOMI  Mouth: MMM  Neck: Full range of motion  Cardiopulmonary: Nonlabored  Skin: Nonjaundiced  MSK: Moves arms equally  Neuro: Awake and oriented  Psych: Normal affect    Imaging:  CXR 10/10/2019  IMPRESSION: No acute abnormality.    Endoscopy:  EGD 8/19/2017  Mucosal changes including ringed esophagus, feline appearance, longitudinal furrows and small caliber esophagus were found in the upper third of the esophagus, in the middle third of esophagus  and in the lower third of the esophagus.  Biopsies were taken with cold forceps for histology.  One moderate benign-appearing, intrinsic stenosis was found 38 cm from the incisors.  This measured 2 cm in length and was traversed.  A through-the-scope dilator was passed and dilated to 16.5 mm.  LA grade B esophagitis with no bleeding was found 39 cm from the incisors.    Pathology 8/19/2017  Antral biopsies normal without H. pylori.  GE junction demonstrates erosive reflux pattern esophagitis without Perry's.  Mid esophagus demonstrates eosinophilic esophagitis pattern of injury (up to 40 eosinophils per high-power field).    40-year-old man with history of eosinophilic esophagitis and autoimmune hepatitis with stage II fibrosis 11/1/2018 on 75 mg azathioprine who presents for evaluation of eosinophilic esophagitis.    #Eosinophilic esophagitis  In clinical remission while on pantoprazole.  Given the length of treatment with pantoprazole, it is very likely he is in histologic remission since he is asymptomatic.  There is not much benefit to performing endoscopy to verify at this point.  It is reasonable to continue pantoprazole 40 mg indefinitely.  We had a long discussion about associations, causations, risks and benefits of these medications.  We also discussed elimination diet.  He is going to think about these options and potentially go through an elimination diet in the future in an effort to reduce the amount of medications he is taking. If he decides to do this, we would get a baseline eosinophil count (while on pantoprazole), then have him stop pantoprazole and eliminate dairy and gluten for 8 weeks, then repeat endoscopy (following step up elimination diet from that point).    #Chest pain  Sounds musculoskeletal in etiology.  Very unlikely to represent esophageal spasm.  Advised him to take ibuprofen should this occur again, and if persistent, consider going to the emergency room for repeat evaluation to  rule out cardiac etiologies.    -Continue pantoprazole 40 mg daily  -RTC 6 months  -Consider elimination diet in the future    Video-Visit Details    Type of service:  Video Visit    Video Start Time: 0840  Video End Time: 0922    Originating Location (pt. Location): Home    Distant Location (provider location):  [facility]     Mode of Communication:  Video Conference via [employer]    Gavin Silverman MD    [facility]  Division of Gastroenterology  [phone]

## 2020-04-23 ENCOUNTER — PRE VISIT (OUTPATIENT)
Dept: GASTROENTEROLOGY | Facility: CLINIC | Age: 40
End: 2020-04-23

## 2020-04-23 ENCOUNTER — VIRTUAL VISIT (OUTPATIENT)
Dept: GASTROENTEROLOGY | Facility: CLINIC | Age: 40
End: 2020-04-23
Attending: INTERNAL MEDICINE
Payer: COMMERCIAL

## 2020-04-23 DIAGNOSIS — Z87.19 HISTORY OF ESOPHAGEAL STRICTURE: ICD-10-CM

## 2020-04-23 DIAGNOSIS — R13.19 ESOPHAGEAL DYSPHAGIA: ICD-10-CM

## 2020-04-23 DIAGNOSIS — K21.00 GASTROESOPHAGEAL REFLUX DISEASE WITH ESOPHAGITIS: ICD-10-CM

## 2020-04-23 DIAGNOSIS — K20.0 EOSINOPHILIC ESOPHAGITIS: Primary | ICD-10-CM

## 2020-04-23 DIAGNOSIS — R07.89 OTHER CHEST PAIN: ICD-10-CM

## 2020-04-23 RX ORDER — CYCLOBENZAPRINE HCL 10 MG
TABLET ORAL
Refills: 0 | COMMUNITY
Start: 2019-10-10 | End: 2020-12-03

## 2020-04-23 ASSESSMENT — PAIN SCALES - GENERAL: PAINLEVEL: NO PAIN (0)

## 2020-04-23 NOTE — NURSING NOTE
Chief Complaint   Patient presents with     Consult     Esophagitis       There were no vitals filed for this visit.    There is no height or weight on file to calculate BMI.      Maribell Piña LPN

## 2020-04-23 NOTE — PATIENT INSTRUCTIONS
It was a pleasure taking care of you today.  I've included a brief summary of our discussion and care plan from today's visit below.  Please review this information with your primary care provider.  _______________________________________________________________________    My recommendations are summarized as follows:  - continue pantoprazole 40mg once per day  - read about EoE and elimination diet (below)- this is an example of all 6 foods that are eliminated but we would start with eliminating 2 foods first (dairy and gluten)    Your esophageal biopsies demonstrated a condition called eosinophilic esophagitis (EoE). This is occasionally referred to 'allergic esophagus' or 'allergic food pipe'.     This condition is due to the presence of eosinophil cells in your esophagus (food pipe or tube) causing inflammation. It can lead to difficulty swallowing due to narrowing and scarring of the esophagus. The goal of treatment is to reduce the inflammation and prevent progressive narrowing and scarring of the esophagus.    There are different options to treat and monitor EoE. Initially, we start a PPI- a stomach acid lowering medication, typically omeprazole 40mg or pantoprazole 40mg once per day. PPI treatment will resolve esophageal inflammation in ~50% of patients. The response to treatment is determined by repeat upper endoscopy (EGD), because symptoms of the condition often lag behind treatment or are not reliable.     If the inflammation does not resolve with the stomach acid lowering medication, treatment options include swallowing a low-dose immune-suppressing steroid medication (similar to the inhaled steroids used for asthma) or a food elimination diet.     EMPIRIC ELIMINATION DIETS FOR EOSINOPHILIC ESOPHAGITIS IN CHILDREN AND ADULTS     Recommendations made by:   Dr. Ti Shahid. [facility], [facility]. [facility] ([facility]).   Dr. Tim Christianson. [facility]. [facility] ([facility]).     PRACTICAL TIPS   1. Dietary therapy is not a panacea. Current estimates for cure rates rounds 40% with a two-food group elimination diet, 50-60% with a four-food group elimination diet and 70% with a six-food group elimination diet.     2. Through 6-week empiric elimination diets, motivated patients may get to know what food groups cause their disease and see whether they can handle long-term avoidance of those foods. If they achieve symptom and inflammation remission, then they ll have to reintroduce one by one the eliminated foods. Each food should be reintroduced for 6 weeks with further endoscopy for each food.     3. Elimination diet may not be a good therapeutic option for patients with severe esophageal strictures or dysphagia, patients already on multiple food restrictions due to IgE-mediated food allergy or adolescent/young adults (who will have more likely a poor compliance).     4. Due to cross reactivity between foods (your body can also react to different foods with similarities to those eliminated), all dairy products will be eliminated, not only cow s milk (including goat s and sheep s milk). Likewise, gluten-containing cereals (barley, oats, rye) will be eliminated with wheat as well.     5. After any dietary intervention, an endoscopy with biopsies is mandatory. Symptoms do not adequately predict normalization of esophageal mucosa. Sedation for endoscopic procedures may be essential to engage patients with elimination diets.     6. Never combine topical corticosteroids and diets for treating EoE.     7. You may interrupt the reintroduction process. For instance, if a patient responds to a 2-food group elimination diet in May, but wants to restart the process after the summer, he can return to a normal  diet during the summer and then, after the summer, reintroduce one food, while eliminating the other, with an endoscopy assessment 6 weeks later.     SIX-FOOD GROUP ELIMINATION DIET (ANIMAL MILK, GLUTEN, EGG, LEGUMES, NUTS, AND FISH/SEAFOOD)   You can consume all these kind of foods for 6 weeks, preferably raw, fresh, or uncooked:   1. Vegetables and tubers (potato)   2. Meat (excepting processed or pre-cooked meats, like sausages and hamburgers)   3. Fruit     You cannot consume for 6 weeks any food known to trigger allergic symptoms on you, like itchy mouth, scratchy throat, hives, skin rash, or asthma. Avoid as much as possible eating out to have a better control of foods. Try to always pick fresh raw foods and avoid those cooked with sauces or fried in pans where potential contamination with breaded and/or wheat sources is likely. You can drink coffee, tea (without animal milk), tonic water, soda, cola, fruit juice, wine, gin, vodka and rum. Beer and whisky are forbidden since they are gluten-containing drinks. The most limited food in the day is breakfast. You can have expresso coffee. Gluten free products for celiac patients are allowed provided they do not contain milk, egg, legumes, or nuts (see below).     1. ANIMAL MILK As a general rule, you should avoid all foods you are not fully sure to be safe. FOODS TO AVOID: - All cow s, goat s and sheep s milk (whole, low-fat, skim, butter milk, evaporated, condensed, powdered, formula milk, hot cocoa) - Milk products (all kind of cheeses, yogurt, butter, margarine, ice creams, milkshakes, custard, creme caramel, rice pudding) - Foods that may contain milk (biscuits, cookies, donuts, muffins, pancakes, waffles, crackers, cream desserts, sweets, candies, chocolate with milk, wallnut cream, sausages, ham, pork sausage) HOW TO READ A LABEL Be sure to avoid foods that contain any of the following information: - Milk, cream, caseinates, hydrolysates, lactalbumin,  casein, whey. - E-4511, E-4512, E-4513. - Flavour, animal fat, cream, proteins, dehydrated powder or sauce. FOODS ALLOWED: - Dairy products made of rice, quinoa.     2. GLUTEN FOODS TO AVOID: - All products containing wheat, barley, rye, oats, spelt, triticale, semola, semolina, and kamut. This wide range of products may include: Wheat-containing: Bread, toast, biscuits, cookies, donuts, muffins, pretzel, pancakes, waffles, crackers, cream desserts, sweets, candies, pasta, cream, soups, sauces, malted food, breaded or floured vegetables. Beer, whisky HOW TO READ A LABEL Be sure to avoid foods that contain any of the following information: - Flour or floured, farina, wheat enriched, malted or malt added, breaded. - E-1404, E-1410, E-1412, E-1413, E-1414, E-1420, E-1422, , E-1442, E-1450. - Starch, fiber, protein, vegetable protein, semola, hydrolized protein, malt, malt extract, cous-cous, yeast, species, flavour. FOODS ALLOWED: - All products allowed for celiac patients provided they do not contain milk, egg, legumes and/or nuts (see below).    3. EGG FOODS TO AVOID: - All products containing egg, baked goods, pasta, cakes, biscuits, cookies, donuts, muffins, pretzel, pancakes, waffles, crackers, cream desserts, sweets, candies, processed meat, goose liver, mayonnaise, coated and wrapped in bread food, breaded or creamed vegetables, processed meat, goose liver, mayonnaise, sauces HOW TO READ A LABEL Be sure to avoid foods that contain any of the following information: - Albumin, apovitellin, binder, coagulant, cholesterol free egg substitute, dried egg, egg white, egg yolk, egg lecithin, egg lysosome, eggnog, egg wash, globulin, lecithin, livetin, lysozyme, meringue, meringue powder, simplesse, surimi, ovoalbumin, ovamucin, ovamucoid, ovomucin, ovomucoid, ovotransferrin, ovovitelin, powdered egg, trailblazer, vitellin, whole egg.     4. LEGUMES FOODS TO AVOID: - Soy, lentils, pea, chickpeas, beans, peanuts,  lupin, guar gum, carob bean, alfalfa. HOW TO READ A LABEL Be sure to avoid foods that contain any of the following information: - Oil made with any of the aforementioned legumes. -  and  ethnic foods often contain soy and peanuts. - Hydrolyzed plant, vegetable protein, plant protein, vegetable gum and vegetable starch. These products are usually present in canned or processed foods.     5. NUTS FOODS TO AVOID: - [place], Artificial nuts, Brazil nut, Beechnut, [place], Cashew, [place], [place] nut, Coconut, Filbert/hazelnut, Gianduja (a chocolate-nut mixture), Ginkgo nut, Hickory nut, Litchi/lichee/lychee nut, Macadamia nut, Marzipan/almond paste, Nangai nut, Natural nut extract (e.g., almond, walnut), Nut butters (e.g., cashew butter), Nut meal, Nut meat, Nut milk (e.g., almond milk, cashew milk), Nut paste (e.g., almond paste), Nut pieces, Pecan, Pesto, Mady nut, Pine nut (also referred to as Ugandan, pignoli, pigñolia, pignon, dwight, and pinyon nut), Pistachio, Praline, Perdomo nut, [place] HOW TO READ A LABEL Be sure to avoid foods that contain any of the following information: - Oil made with any of the aforementioned nuts. -  and  ethnic foods often contain nuts. - Tree nut proteins may be found in cereals, crackers, cookies, candy, chocolates, energy bars, flavored coffee, frozen desserts, marinades, barbeque sauces and some cold cuts, such as mortadella. Some alcoholic beverages may contain nut flavoring.    6. FISH/SEAFOOD FOODS TO AVOID: - All kinds of fish (Anchovies, Bass, Catfish, Cod, Flounder, Grouper, Adalid, Guero, Halibut, Herring, Angela Angela, Perch, [place], [place], [place], Scrod, Swordfish, Sole, Snapper, Tilapia, Trout, Tuna) - All kinds of shellfish (crab, lobster, prawns, shrimps) and mollusks (cockles, mussels, octopus, oyster, snails, squid). HOW TO READ A LABEL Be sure to avoid foods that contain any of the following information: - Oil or gelatin made with any of the  aforementioned fishes or seafood products. -  and  ethnic foods usually contain fish and seafood and are considered high-risk.         Please call our nurse Concepcion with any questions or concerns- [phone].  --    Return to GI Clinic in 6 months to review your progress.    _______________________________________________________________________    Who do I call with any questions after my visit?  Please be in touch if there are any further questions that arise following today's visit.  There are multiple ways to contact your gastroenterology care team.        During business hours, you may reach a Gastroenterology nurse at [phone] and choose option 3.         To schedule or reschedule an appointment, please call [phone].       You can always send a secure message through [employer].  [employer] messages are answered by your nurse or doctor typically within 24 hours.  Please allow extra time on weekends and holidays.        For urgent/emergent questions after business hours, you may reach the on-call GI Fellow by contacting the [facility]  at [phone].     How will I get the results of any tests ordered?    You will receive all of your results.  If you have signed up for [employer] [facility], any tests ordered at your visit will be available to you after your physician reviews them.  Typically this takes 1-2 weeks.  If there are urgent results that require a change in your care plan, your physician or nurse will call you to discuss the next steps.      What is [employer]?  [employer] is a secure way for you to access all of your healthcare records from the [facility].  It is a web based computer program, so you can sign on to it from any location.  It also allows you to send secure messages to your care team.  I recommend signing up for [employer] access if you have not already done so and are comfortable with using a computer.      How to I schedule a follow-up visit?  If you did not  schedule a follow-up visit today, please call [phone] to schedule a follow-up office visit.        Sincerely,    Gavin Silverman MD     [facility]  Division of Gastroenterology

## 2020-05-07 ENCOUNTER — TELEPHONE (OUTPATIENT)
Dept: GASTROENTEROLOGY | Facility: CLINIC | Age: 40
End: 2020-05-07

## 2020-06-11 DIAGNOSIS — K75.4 AUTOIMMUNE HEPATITIS (H): ICD-10-CM

## 2020-06-11 LAB
ERYTHROCYTE [DISTWIDTH] IN BLOOD BY AUTOMATED COUNT: 12.2 % (ref 10–15)
HCT VFR BLD AUTO: 43.9 % (ref 40–53)
HGB BLD-MCNC: 15.2 G/DL (ref 13.3–17.7)
MCH RBC QN AUTO: 32.5 PG (ref 26.5–33)
MCHC RBC AUTO-ENTMCNC: 34.6 G/DL (ref 31.5–36.5)
MCV RBC AUTO: 94 FL (ref 78–100)
PLATELET # BLD AUTO: 152 10E9/L (ref 150–450)
RBC # BLD AUTO: 4.67 10E12/L (ref 4.4–5.9)
WBC # BLD AUTO: 5.7 10E9/L (ref 4–11)

## 2020-06-11 PROCEDURE — 36415 COLL VENOUS BLD VENIPUNCTURE: CPT | Performed by: INTERNAL MEDICINE

## 2020-06-11 PROCEDURE — 85027 COMPLETE CBC AUTOMATED: CPT | Performed by: INTERNAL MEDICINE

## 2020-06-16 ENCOUNTER — VIRTUAL VISIT (OUTPATIENT)
Dept: GASTROENTEROLOGY | Facility: CLINIC | Age: 40
End: 2020-06-16
Attending: PHYSICIAN ASSISTANT
Payer: COMMERCIAL

## 2020-06-16 DIAGNOSIS — K75.4 AUTOIMMUNE HEPATITIS (H): Primary | ICD-10-CM

## 2020-06-16 ASSESSMENT — PAIN SCALES - GENERAL: PAINLEVEL: NO PAIN (0)

## 2020-06-16 NOTE — PROGRESS NOTES
"Satnam Gonzales is a 40 year old male who is being evaluated via a billable video visit.      The patient has been notified of following:     \"This video visit will be conducted via a call between you and your physician/provider. We have found that certain health care needs can be provided without the need for an in-person physical exam.  This service lets us provide the care you need with a video conversation.  If a prescription is necessary we can send it directly to your pharmacy.  If lab work is needed we can place an order for that and you can then stop by our lab to have the test done at a later time.    Video visits are billed at different rates depending on your insurance coverage.  Please reach out to your insurance provider with any questions.    If during the course of the call the physician/provider feels a video visit is not appropriate, you will not be charged for this service.\"    Patient has given verbal consent for Video visit? Yes    Will anyone else be joining your video visit? No        Video-Visit Details    Type of service:  Video Visit     Video Start Time:12:45 pm   Video End Time: 1:03 pm   Originating Location (pt. Location): Home    Distant Location (provider location):  [facility] HEPATOLOGY     Platform used for Video Visit:Sp Do PA-C      Hepatology Follow-up Clinic note  Satnam Gonzales   Date of Birth 1980  Date of Service 6/16/2020    Reason for follow-up: [facility]          Assessment/plan:   Satnam Gonzales is a 40 year old male with autoimmune hepatitis that has been maintained on 75 mg Azathioprine. Unfortunately recent labs did not include hepatic panel. His platelets are low normal, but his liver function has otherwise been normal. Liver biopsy in 2018 showed Stage 2 fibrosis. He has demonstrated good compliance with his medications.     - Hepatic panel, BMP and INR in the near future  - Continue CBC every 3 months  - Repeat hepatic panel, BMP and INR in six months  - " Fibrosis scan in six months   - Follow-up in clinic in one year or sooner as needed    Nae Do PA-C   [facility] Hepatology clinic    -----------------------------------------------------       HPI:   Satnam Gonzales is a 40 year old male presenting for follow-up.     Autoimmune Hepatitis   Liver Biopsy on 11/1/2018: mildly active hepatitis with chronic features  Stage 2 fibrosis   F-actin - 56   Treatment:   -Prednisone taper started 11/2018, stayed on 2.5 mg since February 2019  -Azathioprine started 11/2018, increased to 75 mg in January 5, 2019    Patient was last seen by me on 11/22/2019. No recent hospitalizations or ER visits. No new medications. He has was seen by esophogeal specialist for EoE. He is considering an elimination diet in the future .    Her reports good compliance with Azathioprine. He has been exercising more due to COVID pandemic. He states he may have lost some weight. No other particular complaints or concerns.     Patient denies jaundice, lower extremity edema, abdominal distension or confusion.  Patient also denies melena, hematochezia or hematemesis.Patient denies fevers, sweats or chills.    He does not drink alcohol. He works as a teacher.     Medical hx Surgical hx   Past Medical History:   Diagnosis Date     Autoimmune hepatitis (H)      Depression      Eosinophilic esophagitis     Past Surgical History:   Procedure Laterality Date     NO HISTORY OF SURGERY                   Medications:     Current Outpatient Medications   Medication     azaTHIOprine 75 MG TABS     cetirizine (ZYRTEC) 10 MG CHEW     pantoprazole (PROTONIX) 40 MG EC tablet     cyclobenzaprine (FLEXERIL) 10 MG tablet     multivitamin, therapeutic with minerals (MULTI-VITAMIN) TABS tablet     No current facility-administered medications for this visit.             Allergies:     Allergies   Allergen Reactions     Seasonal Allergies             Review of Systems:   10 points ROS was obtained and  highlighted in the HPI, otherwise negative.          Physical Exam:     GENERAL: healthy, alert and no distress  EYES: Eyes grossly normal to inspection, conjunctivae and sclerae normal  RESP: no audible wheeze, cough, or visible cyanosis.  No visible retractions or increased work of breathing.  Able to speak fully in complete sentences  NEURO: Cranial nerves grossly intact, mentation intact and speech normal  PSYCH: mentation appears normal, affect normal/bright, judgement and insight intact, normal speech and appearance well-groomed           Data:   Reviewed in person and significant for:    Lab Results   Component Value Date     11/22/2019      Lab Results   Component Value Date    POTASSIUM 4.4 11/22/2019     Lab Results   Component Value Date    CHLORIDE 102 11/22/2019     Lab Results   Component Value Date    CO2 32 11/22/2019     Lab Results   Component Value Date    BUN 17 11/22/2019     Lab Results   Component Value Date    CR 1.15 11/22/2019       Lab Results   Component Value Date    WBC 5.7 06/11/2020     Lab Results   Component Value Date    HGB 15.2 06/11/2020     Lab Results   Component Value Date    HCT 43.9 06/11/2020     Lab Results   Component Value Date    MCV 94 06/11/2020     Lab Results   Component Value Date     06/11/2020       Lab Results   Component Value Date    AST 26 11/22/2019     Lab Results   Component Value Date    ALT 47 11/22/2019     No results found for: BILICONJ   Lab Results   Component Value Date    BILITOTAL 0.9 11/22/2019       Lab Results   Component Value Date    ALBUMIN 4.4 11/22/2019     Lab Results   Component Value Date    PROTTOTAL 7.5 11/22/2019      Lab Results   Component Value Date    ALKPHOS 93 11/22/2019       Lab Results   Component Value Date    INR 1.11 11/22/2019

## 2020-06-16 NOTE — LETTER
"    6/16/2020         RE: Satnam Gonzales  [address]  [place] [address]        Dear Colleague,    Thank you for referring your patient, Satnam Gonzales, to the [facility] HEPATOLOGY. Please see a copy of my visit note below.    Left voicemail for patient to call back to set up telemedicine visit, will call again before appointment time.  Inés Vieira, CMA on 6/16/2020 at 11:38 AM      Satnam Gonzales is a 40 year old male who is being evaluated via a billable video visit.      The patient has been notified of following:     \"This video visit will be conducted via a call between you and your physician/provider. We have found that certain health care needs can be provided without the need for an in-person physical exam.  This service lets us provide the care you need with a video conversation.  If a prescription is necessary we can send it directly to your pharmacy.  If lab work is needed we can place an order for that and you can then stop by our lab to have the test done at a later time.    Video visits are billed at different rates depending on your insurance coverage.  Please reach out to your insurance provider with any questions.    If during the course of the call the physician/provider feels a video visit is not appropriate, you will not be charged for this service.\"    Patient has given verbal consent for Video visit? Yes    Will anyone else be joining your video visit? No        Video-Visit Details    Type of service:  Video Visit     Video Start Time:12:45 pm   Video End Time: 1:03 pm   Originating Location (pt. Location): Home    Distant Location (provider location):  [facility] HEPATOLOGY     Platform used for Video Visit:Sp Do PA-C      Hepatology Follow-up Clinic note  Satnam Gonzales   Date of Birth 1980  Date of Service 6/16/2020    Reason for follow-up: [facility]          Assessment/plan:   Satnam Gonzales is a 40 year old male with autoimmune hepatitis that has been maintained on 75 " mg Azathioprine. Unfortunately recent labs did not include hepatic panel. His platelets are low normal, but his liver function has otherwise been normal. Liver biopsy in 2018 showed Stage 2 fibrosis. He has demonstrated good compliance with his medications.     - Hepatic panel, BMP and INR in the near future  - Continue CBC every 3 months  - Repeat hepatic panel, BMP and INR in six months  - Fibrosis scan in six months   - Follow-up in clinic in one year or sooner as needed    Nae Do PA-C   [facility] Hepatology clinic    -----------------------------------------------------       HPI:   Satnam Gonzales is a 40 year old male presenting for follow-up.     Autoimmune Hepatitis   Liver Biopsy on 11/1/2018: mildly active hepatitis with chronic features  Stage 2 fibrosis   F-actin - 56   Treatment:   -Prednisone taper started 11/2018, stayed on 2.5 mg since February 2019  -Azathioprine started 11/2018, increased to 75 mg in January 5, 2019    Patient was last seen by me on 11/22/2019. No recent hospitalizations or ER visits. No new medications. He has was seen by esophogeal specialist for EoE. He is considering an elimination diet in the future .    Her reports good compliance with Azathioprine. He has been exercising more due to COVID pandemic. He states he may have lost some weight. No other particular complaints or concerns.     Patient denies jaundice, lower extremity edema, abdominal distension or confusion.  Patient also denies melena, hematochezia or hematemesis.Patient denies fevers, sweats or chills.    He does not drink alcohol. He works as a teacher.     Medical hx Surgical hx   Past Medical History:   Diagnosis Date     Autoimmune hepatitis (H)      Depression      Eosinophilic esophagitis     Past Surgical History:   Procedure Laterality Date     NO HISTORY OF SURGERY                   Medications:     Current Outpatient Medications   Medication     azaTHIOprine 75 MG TABS     cetirizine  (ZYRTEC) 10 MG CHEW     pantoprazole (PROTONIX) 40 MG EC tablet     cyclobenzaprine (FLEXERIL) 10 MG tablet     multivitamin, therapeutic with minerals (MULTI-VITAMIN) TABS tablet     No current facility-administered medications for this visit.             Allergies:     Allergies   Allergen Reactions     Seasonal Allergies             Review of Systems:   10 points ROS was obtained and highlighted in the HPI, otherwise negative.          Physical Exam:     GENERAL: healthy, alert and no distress  EYES: Eyes grossly normal to inspection, conjunctivae and sclerae normal  RESP: no audible wheeze, cough, or visible cyanosis.  No visible retractions or increased work of breathing.  Able to speak fully in complete sentences  NEURO: Cranial nerves grossly intact, mentation intact and speech normal  PSYCH: mentation appears normal, affect normal/bright, judgement and insight intact, normal speech and appearance well-groomed           Data:   Reviewed in person and significant for:    Lab Results   Component Value Date     11/22/2019      Lab Results   Component Value Date    POTASSIUM 4.4 11/22/2019     Lab Results   Component Value Date    CHLORIDE 102 11/22/2019     Lab Results   Component Value Date    CO2 32 11/22/2019     Lab Results   Component Value Date    BUN 17 11/22/2019     Lab Results   Component Value Date    CR 1.15 11/22/2019       Lab Results   Component Value Date    WBC 5.7 06/11/2020     Lab Results   Component Value Date    HGB 15.2 06/11/2020     Lab Results   Component Value Date    HCT 43.9 06/11/2020     Lab Results   Component Value Date    MCV 94 06/11/2020     Lab Results   Component Value Date     06/11/2020       Lab Results   Component Value Date    AST 26 11/22/2019     Lab Results   Component Value Date    ALT 47 11/22/2019     No results found for: BILICONJ   Lab Results   Component Value Date    BILITOTAL 0.9 11/22/2019       Lab Results   Component Value Date    ALBUMIN 4.4  11/22/2019     Lab Results   Component Value Date    PROTTOTAL 7.5 11/22/2019      Lab Results   Component Value Date    ALKPHOS 93 11/22/2019       Lab Results   Component Value Date    INR 1.11 11/22/2019               Again, thank you for allowing me to participate in the care of your patient.        Sincerely,        Nae Do PA-C

## 2020-06-16 NOTE — PROGRESS NOTES
Left voicemail for patient to call back to set up telemedicine visit, will call again before appointment time.  Inés Vieira CMA on 6/16/2020 at 11:38 AM

## 2020-08-04 DIAGNOSIS — K75.4 AUTOIMMUNE HEPATITIS (H): ICD-10-CM

## 2020-08-04 LAB — INR PPP: 1.05 (ref 0.86–1.14)

## 2020-08-04 PROCEDURE — 36415 COLL VENOUS BLD VENIPUNCTURE: CPT | Performed by: INTERNAL MEDICINE

## 2020-08-04 PROCEDURE — 85610 PROTHROMBIN TIME: CPT | Performed by: INTERNAL MEDICINE

## 2020-08-04 PROCEDURE — 80048 BASIC METABOLIC PNL TOTAL CA: CPT | Performed by: INTERNAL MEDICINE

## 2020-08-04 PROCEDURE — 80076 HEPATIC FUNCTION PANEL: CPT | Performed by: INTERNAL MEDICINE

## 2020-08-05 LAB
ALBUMIN SERPL-MCNC: 3.8 G/DL (ref 3.4–5)
ALP SERPL-CCNC: 77 U/L (ref 40–150)
ALT SERPL W P-5'-P-CCNC: 65 U/L (ref 0–70)
ANION GAP SERPL CALCULATED.3IONS-SCNC: 5 MMOL/L (ref 3–14)
AST SERPL W P-5'-P-CCNC: 96 U/L (ref 0–45)
BILIRUB DIRECT SERPL-MCNC: 0.2 MG/DL (ref 0–0.2)
BILIRUB SERPL-MCNC: 0.8 MG/DL (ref 0.2–1.3)
BUN SERPL-MCNC: 13 MG/DL (ref 7–30)
CALCIUM SERPL-MCNC: 8.7 MG/DL (ref 8.5–10.1)
CHLORIDE SERPL-SCNC: 105 MMOL/L (ref 94–109)
CO2 SERPL-SCNC: 28 MMOL/L (ref 20–32)
CREAT SERPL-MCNC: 1.05 MG/DL (ref 0.66–1.25)
GFR SERPL CREATININE-BSD FRML MDRD: 88 ML/MIN/{1.73_M2}
GLUCOSE SERPL-MCNC: 84 MG/DL (ref 70–99)
POTASSIUM SERPL-SCNC: 4.2 MMOL/L (ref 3.4–5.3)
PROT SERPL-MCNC: 7.2 G/DL (ref 6.8–8.8)
SODIUM SERPL-SCNC: 138 MMOL/L (ref 133–144)

## 2020-08-11 ENCOUNTER — MYC MEDICAL ADVICE (OUTPATIENT)
Dept: GASTROENTEROLOGY | Facility: CLINIC | Age: 40
End: 2020-08-11

## 2020-08-11 DIAGNOSIS — K75.4 AUTOIMMUNE HEPATITIS (H): Primary | ICD-10-CM

## 2020-11-03 DIAGNOSIS — K75.4 AUTOIMMUNE HEPATITIS (H): ICD-10-CM

## 2020-11-03 LAB
ERYTHROCYTE [DISTWIDTH] IN BLOOD BY AUTOMATED COUNT: 12.3 % (ref 10–15)
HCT VFR BLD AUTO: 43.8 % (ref 40–53)
HGB BLD-MCNC: 15.5 G/DL (ref 13.3–17.7)
MCH RBC QN AUTO: 32.5 PG (ref 26.5–33)
MCHC RBC AUTO-ENTMCNC: 35.4 G/DL (ref 31.5–36.5)
MCV RBC AUTO: 92 FL (ref 78–100)
PLATELET # BLD AUTO: 177 10E9/L (ref 150–450)
RBC # BLD AUTO: 4.77 10E12/L (ref 4.4–5.9)
WBC # BLD AUTO: 8.9 10E9/L (ref 4–11)

## 2020-11-03 PROCEDURE — 80076 HEPATIC FUNCTION PANEL: CPT | Performed by: PHYSICIAN ASSISTANT

## 2020-11-03 PROCEDURE — 82784 ASSAY IGA/IGD/IGG/IGM EACH: CPT | Performed by: PHYSICIAN ASSISTANT

## 2020-11-03 PROCEDURE — 36415 COLL VENOUS BLD VENIPUNCTURE: CPT | Performed by: PHYSICIAN ASSISTANT

## 2020-11-03 PROCEDURE — 85027 COMPLETE CBC AUTOMATED: CPT | Performed by: PHYSICIAN ASSISTANT

## 2020-11-04 LAB
ALBUMIN SERPL-MCNC: 4.3 G/DL (ref 3.4–5)
ALP SERPL-CCNC: 80 U/L (ref 40–150)
ALT SERPL W P-5'-P-CCNC: 28 U/L (ref 0–70)
AST SERPL W P-5'-P-CCNC: 19 U/L (ref 0–45)
BILIRUB DIRECT SERPL-MCNC: 0.2 MG/DL (ref 0–0.2)
BILIRUB SERPL-MCNC: 0.9 MG/DL (ref 0.2–1.3)
IGG SERPL-MCNC: 938 MG/DL (ref 610–1616)
PROT SERPL-MCNC: 7.2 G/DL (ref 6.8–8.8)

## 2020-12-03 ENCOUNTER — VIRTUAL VISIT (OUTPATIENT)
Dept: FAMILY MEDICINE | Facility: CLINIC | Age: 40
End: 2020-12-03
Payer: COMMERCIAL

## 2020-12-03 DIAGNOSIS — K20.0 EOSINOPHILIC ESOPHAGITIS: ICD-10-CM

## 2020-12-03 DIAGNOSIS — K75.4 AUTOIMMUNE HEPATITIS (H): ICD-10-CM

## 2020-12-03 DIAGNOSIS — U07.1 CLINICAL DIAGNOSIS OF COVID-19: Primary | ICD-10-CM

## 2020-12-03 PROCEDURE — 99214 OFFICE O/P EST MOD 30 MIN: CPT | Mod: 95 | Performed by: INTERNAL MEDICINE

## 2020-12-03 RX ORDER — PREDNISONE 20 MG/1
TABLET ORAL
Qty: 20 TABLET | Refills: 0 | Status: SHIPPED | OUTPATIENT
Start: 2020-12-03 | End: 2021-08-27

## 2020-12-03 NOTE — PROGRESS NOTES
"Satnam Gonzales is a 40 year old male who is being evaluated via a billable telephone visit.      The patient has been notified of following:     \"This telephone visit will be conducted via a call between you and your physician/provider. We have found that certain health care needs can be provided without the need for a physical exam.  This service lets us provide the care you need with a short phone conversation.  If a prescription is necessary we can send it directly to your pharmacy.  If lab work is needed we can place an order for that and you can then stop by our lab to have the test done at a later time.    Telephone visits are billed at different rates depending on your insurance coverage. During this emergency period, for some insurers they may be billed the same as an in-person visit.  Please reach out to your insurance provider with any questions.    If during the course of the call the physician/provider feels a telephone visit is not appropriate, you will not be charged for this service.\"    Patient has given verbal consent for Telephone visit?  Yes    What phone number would you like to be contacted at? [phone]      How would you like to obtain your AVS? MyChart    Subjective     Satnam Gonzales is a 40 year old male who presents via phone visit today for the following health issues:    HPI          Chief Complaint:     Follow up on COVID-19 diagnosis      HPI:   Patient Satnam Gonzales is a very pleasant 39 year old male with history of eosinophilic esophagitis, allergic rhinitis today for telephone visit for follow up of multiple concerns including follow up on recent COVID-19 diagnosis, chronic eosinophilic esophagitis. Regarding the patient's chronic GERD symptoms due to eosinophilic esophagitis, the patient reports that his eosinophilic esophagitis symptoms are stable on current Protonix medication and he is not due for a reifll of his GERD medication with Protonix at this time. No chest pain, " headaches, fever or chills. COVID-19 infection symptoms improving although not fully resolved at this time.        Current Medications:     Current Outpatient Medications   Medication Sig Dispense Refill     azaTHIOprine 75 MG TABS Take 75 mg by mouth daily 30 tablet 11     cetirizine (ZYRTEC) 10 MG CHEW Take 1 tablet (10 mg) by mouth daily 90 tablet 3     multivitamin, therapeutic with minerals (MULTI-VITAMIN) TABS tablet Take 1 tablet by mouth daily       pantoprazole (PROTONIX) 40 MG EC tablet Take 1 tablet (40 mg) by mouth daily Take 30-60 minutes before a meal. 90 tablet 3     predniSONE (DELTASONE) 20 MG tablet Take 3 tabs by mouth daily x 3 days, then 2 tabs daily x 3 days, then 1 tab daily x 3 days, then 1/2 tab daily x 3 days. 20 tablet 0         Allergies:      Allergies   Allergen Reactions     Seasonal Allergies             Past Medical History:     Past Medical History:   Diagnosis Date     Autoimmune hepatitis (H)      Depression      Eosinophilic esophagitis          Past Surgical History:     Past Surgical History:   Procedure Laterality Date     NO HISTORY OF SURGERY           Family Medical History:     Family History   Problem Relation Age of Onset     C.A.D. Paternal Grandfather 50     Alzheimer Disease Maternal Grandfather 73     Food Allergy Father      Seasonal/Environmental Allergies Brother          Social History:     Social History     Socioeconomic History     Marital status:      Spouse name: Not on file     Number of children: Not on file     Years of education: Not on file     Highest education level: Not on file   Occupational History     Not on file   Social Needs     Financial resource strain: Not on file     Food insecurity     Worry: Not on file     Inability: Not on file     Transportation needs     Medical: Not on file     Non-medical: Not on file   Tobacco Use     Smoking status: Former Smoker     Packs/day: 1.00     Years: 10.00     Pack years: 10.00     Smokeless  tobacco: Former User     Types: Chew   Substance and Sexual Activity     Alcohol use: No     Drug use: No     Sexual activity: Yes     Partners: Female   Lifestyle     Physical activity     Days per week: Not on file     Minutes per session: Not on file     Stress: Not on file   Relationships     Social connections     Talks on phone: Not on file     Gets together: Not on file     Attends [employer] service: Not on file     Active member of club or organization: Not on file     Attends meetings of clubs or organizations: Not on file     Relationship status: Not on file     Intimate partner violence     Fear of current or ex partner: Not on file     Emotionally abused: Not on file     Physically abused: Not on file     Forced sexual activity: Not on file   Other Topics Concern     Parent/sibling w/ CABG, MI or angioplasty before 65F 55M? Not Asked   Social History Narrative     Not on file           Review of System:     Constitutional: Negative for fever or chills  Skin: Negative for rashes  Ears/Nose/Throat: positive for chronic allergic rhinitis  Respiratory: positive for recent COVID-19 diagnosis  Cardiovascular: Negative for chest pain  Gastrointestinal: positive for chronic eosinophilic esophagitis and autoimmune hepatitis  Genitourinary: Negative for dysuria, hematuria  Musculoskeletal: Negative for myalgias  Neurologic: Negative for headaches  Psychiatric: Negative for depression, anxiety  Hematologic/Lymphatic/Immunologic: Negative  Endocrine: Negative  Behavioral: Negative for tobacco use       Physical Exam:   There were no vitals taken for this visit.    RESP: no cough over the phone   NEURO: Alert & Oriented x 3 over the phone  PSYCH: mentation appears normal, affect normal over the phone        Diagnostic Test Results:       Liver Function Studies -   Recent Labs   Lab Test 11/22/19  1019   PROTTOTAL 7.5   ALBUMIN 4.4   BILITOTAL 0.9   ALKPHOS 93   AST 26   ALT 47         ASSESSMENT/PLAN:     (U07.1)  Clinical diagnosis of COVID-19  (primary encounter diagnosis)  Comment: recent diagnosis of COVID-19 infection, symptoms improving although not fully resolved at this time.  Plan: predniSONE (DELTASONE) 20 MG tablet, COVID-19         Virus (Coronavirus) Antibody & Titer Reflex      (K20.0) Eosinophilic esophagitis   Comment: stable on Protonix.  Plan: continue pantoprazole (PROTONIX) 40 MG EC tablet,          (K75.4) Autoimmune hepatitis (H)  Comment: patient's currently followed by hepatology clinic at the [facility]  Plan: continue current azathioprine medication and outpatient hepatology clinic follow up going forward.        Follow Up Plan:     Patient is instructed to return to Internal Medicine clinic for follow-up visit in 1 week    Phone call duration:  25minutes      Chel Beaulieu MD  Internal Medicine  [facility]

## 2020-12-10 DIAGNOSIS — U07.1 CLINICAL DIAGNOSIS OF COVID-19: ICD-10-CM

## 2020-12-10 PROCEDURE — 86769 SARS-COV-2 COVID-19 ANTIBODY: CPT | Performed by: INTERNAL MEDICINE

## 2020-12-10 PROCEDURE — 36415 COLL VENOUS BLD VENIPUNCTURE: CPT | Performed by: INTERNAL MEDICINE

## 2020-12-12 LAB
COVID-19 SPIKE RBD ABY TITER: NORMAL
COVID-19 SPIKE RBD ABY: POSITIVE

## 2021-01-20 DIAGNOSIS — K75.4 AUTOIMMUNE HEPATITIS (H): ICD-10-CM

## 2021-01-20 RX ORDER — AZATHIOPRINE 75 MG/1
75 TABLET ORAL DAILY
Qty: 30 TABLET | Refills: 11 | Status: SHIPPED | OUTPATIENT
Start: 2021-01-20 | End: 2021-10-27

## 2021-02-03 DIAGNOSIS — K20.0 EOSINOPHILIC ESOPHAGITIS: ICD-10-CM

## 2021-02-04 RX ORDER — PANTOPRAZOLE SODIUM 40 MG/1
40 TABLET, DELAYED RELEASE ORAL DAILY
Qty: 90 TABLET | Refills: 0 | Status: SHIPPED | OUTPATIENT
Start: 2021-02-04 | End: 2021-05-03

## 2021-04-18 ENCOUNTER — HEALTH MAINTENANCE LETTER (OUTPATIENT)
Age: 41
End: 2021-04-18

## 2021-05-01 DIAGNOSIS — K20.0 EOSINOPHILIC ESOPHAGITIS: ICD-10-CM

## 2021-05-03 RX ORDER — PANTOPRAZOLE SODIUM 40 MG/1
40 TABLET, DELAYED RELEASE ORAL DAILY
Qty: 90 TABLET | Refills: 0 | Status: SHIPPED | OUTPATIENT
Start: 2021-05-03 | End: 2021-07-28

## 2021-06-04 DIAGNOSIS — K75.4 AUTOIMMUNE HEPATITIS (H): Primary | ICD-10-CM

## 2021-07-09 DIAGNOSIS — K75.4 AUTOIMMUNE HEPATITIS (H): ICD-10-CM

## 2021-07-09 LAB
ALBUMIN SERPL-MCNC: 4.4 G/DL (ref 3.4–5)
ALP SERPL-CCNC: 73 U/L (ref 40–150)
ALT SERPL W P-5'-P-CCNC: 25 U/L (ref 0–70)
AST SERPL W P-5'-P-CCNC: 16 U/L (ref 0–45)
BILIRUB DIRECT SERPL-MCNC: 0.2 MG/DL (ref 0–0.2)
BILIRUB SERPL-MCNC: 0.9 MG/DL (ref 0.2–1.3)
ERYTHROCYTE [DISTWIDTH] IN BLOOD BY AUTOMATED COUNT: 12.4 % (ref 10–15)
HCT VFR BLD AUTO: 43.9 % (ref 40–53)
HGB BLD-MCNC: 15.6 G/DL (ref 13.3–17.7)
MCH RBC QN AUTO: 32.3 PG (ref 26.5–33)
MCHC RBC AUTO-ENTMCNC: 35.5 G/DL (ref 31.5–36.5)
MCV RBC AUTO: 91 FL (ref 78–100)
PLATELET # BLD AUTO: 172 10E9/L (ref 150–450)
PROT SERPL-MCNC: 7 G/DL (ref 6.8–8.8)
RBC # BLD AUTO: 4.83 10E12/L (ref 4.4–5.9)
WBC # BLD AUTO: 4.4 10E9/L (ref 4–11)

## 2021-07-09 PROCEDURE — 85027 COMPLETE CBC AUTOMATED: CPT | Performed by: PHYSICIAN ASSISTANT

## 2021-07-09 PROCEDURE — 36415 COLL VENOUS BLD VENIPUNCTURE: CPT | Performed by: PHYSICIAN ASSISTANT

## 2021-07-09 PROCEDURE — 80076 HEPATIC FUNCTION PANEL: CPT | Performed by: PHYSICIAN ASSISTANT

## 2021-08-27 ENCOUNTER — VIRTUAL VISIT (OUTPATIENT)
Dept: GASTROENTEROLOGY | Facility: CLINIC | Age: 41
End: 2021-08-27
Payer: COMMERCIAL

## 2021-08-27 VITALS — WEIGHT: 178 LBS | BODY MASS INDEX: 26.29 KG/M2

## 2021-08-27 DIAGNOSIS — K20.0 EOSINOPHILIC ESOPHAGITIS: Primary | ICD-10-CM

## 2021-08-27 DIAGNOSIS — K21.00 GASTROESOPHAGEAL REFLUX DISEASE WITH ESOPHAGITIS WITHOUT HEMORRHAGE: ICD-10-CM

## 2021-08-27 DIAGNOSIS — Z79.899 CURRENT USE OF PROTON PUMP INHIBITOR: ICD-10-CM

## 2021-08-27 PROCEDURE — 99215 OFFICE O/P EST HI 40 MIN: CPT | Mod: 95 | Performed by: INTERNAL MEDICINE

## 2021-08-27 NOTE — NURSING NOTE
Chief Complaint   Patient presents with     Follow Up       Vitals:    08/27/21 0857   Weight: 80.7 kg (178 lb)       Body mass index is 26.29 kg/m .    Xiao Brar CMA

## 2021-08-27 NOTE — Clinical Note
Hey N- FYI he may call you in the future if he decides to pursue EGD off pantoprazole.     Thanks! T

## 2021-08-27 NOTE — PATIENT INSTRUCTIONS
It was a pleasure taking care of you today.  I've included a brief summary of our discussion and care plan from today's visit below.  Please review this information with your primary care provider.  _______________________________________________________________________    My recommendations are summarized as follows:  - ok to stay on pantoprazole 40mg once per day indefinitely.  - also ok to wean pantoprazole- 20mg once per day x7days, then 20mg every other day x7days, then off.   - let us know if you choose the latter option and we can schedule an upper endoscopy 8 to 12 weeks after you completely stop panprazole (EGD under MAC) and repeat biopsies to see if the azathioprine is helping your EoE or not.     Please call our nurse Concepcion with any questions or concerns- [phone].  --    Return to GI Clinic in 12 months to review your progress.    _______________________________________________________________________    Who do I call with any questions after my visit?  Please be in touch if there are any further questions that arise following today's visit.  There are multiple ways to contact your gastroenterology care team.        During business hours, you may reach a Gastroenterology nurse at [phone] and choose option 3.         To schedule or reschedule an appointment, please call [phone].       You can always send a secure message through [employer].  [employer] messages are answered by your nurse or doctor typically within 24 hours.  Please allow extra time on weekends and holidays.        For urgent/emergent questions after business hours, you may reach the on-call GI Fellow by contacting the [facility] at [phone].     How will I get the results of any tests ordered?    You will receive all of your results.  If you have signed up for MyChart, any tests ordered at your visit will be available to you after your physician reviews them.  Typically this takes 1-2 weeks.  If there are  urgent results that require a change in your care plan, your physician or nurse will call you to discuss the next steps.      What is [employer]?  [employer] is a secure way for you to access all of your healthcare records from the [facility].  It is a web based computer program, so you can sign on to it from any location.  It also allows you to send secure messages to your care team.  I recommend signing up for [employer] access if you have not already done so and are comfortable with using a computer.      How to I schedule a follow-up visit?  If you did not schedule a follow-up visit today, please call [phone] to schedule a follow-up office visit.        Sincerely,    Gavin Silverman MD     [facility]  Division of Gastroenterology

## 2021-08-27 NOTE — PROGRESS NOTES
"Satnam Gonzales is a 41 year old male who is being evaluated via a billable video visit.      The patient has been notified of following:     \"This video visit will be conducted via a call between you and your physician/provider. We have found that certain health care needs can be provided without the need for an in-person physical exam.  This service lets us provide the care you need with a video conversation.  If a prescription is necessary we can send it directly to your pharmacy.  If lab work is needed we can place an order for that and you can then stop by our lab to have the test done at a later time.    If during the course of the call the physician/provider feels a video visit is not appropriate, you will not be charged for this service.\"     Patient confirmed that they are in [place] for today's visit yes.    Video-Visit Details  Type of service:  Video Visit    Video Start Time: 927a  Video End Time:  9:55 AM    Originating Location (pt. Location): Home    Distant Location (provider location):  [facility] GASTROENTEROLOGY CLINIC [place]     Platform used: [employer]            "

## 2021-08-27 NOTE — PROGRESS NOTES
HPI:   40 yo man with history of eosinophilic esophagitis and autoimmune hepatitis with stage II fibrosis 11/1/2018 on 75 mg azathioprine who presents for evaluation of eosinophilic esophagitis.    No concerns or complaints in regard to swallowing. No dysphagia, odynophagia. No issues with bleeding or bowel movements.     He scheduled this appointment mostly to discuss longterm use of PPI.    A 10 point ROS is otherwise negative.    Past Medical History:   Diagnosis Date     Autoimmune hepatitis (H)      Depression      Eosinophilic esophagitis       Past surgical history:  No abdominal surgeries.      Social history:  No alcohol use.  Non-smoker.  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     Family history:  Father with egg allergy.  Brother with egg and milk allergy.  No esophageal pathology.    Social History     Tobacco Use     Smoking status: Former Smoker     Packs/day: 1.00     Years: 10.00     Pack years: 10.00     Smokeless tobacco: Former User     Types: Chew   Substance Use Topics     Alcohol use: No     Current Outpatient Medications   Medication     azaTHIOprine 75 MG TABS     cetirizine (ZYRTEC) 10 MG CHEW     multivitamin, therapeutic with minerals (MULTI-VITAMIN) TABS tablet     pantoprazole (PROTONIX) 40 MG EC tablet     No current facility-administered medications for this visit.      Physical exam:  GEN: NAD  Eyes: EOMI  Ears: hearing intact  Mouth: MMM  Neck: full ROM  Cardiopulmonary: non labored  Skin: no jaundice  Neuro: awake and oriented  MSK: moves arms equally  Psych: normal affect     Labs:   No peripheral eosinophilia.    Imaging:   No new imaging.    Endoscopy:  EGD 8/19/2017  Mucosal changes including ringed esophagus, feline appearance, longitudinal furrows and small caliber esophagus were found in the upper third of the esophagus, in the middle third of esophagus and in the lower third of the esophagus.  Biopsies were taken with cold forceps for histology.  One moderate  benign-appearing, intrinsic stenosis was found 38 cm from the incisors.  This measured 2 cm in length and was traversed.  A through-the-scope dilator was passed and dilated to 16.5 mm.  LA grade B esophagitis with no bleeding was found 39 cm from the incisors.    Pathology 8/19/2017  Antral biopsies normal without H. pylori.  GE junction demonstrates erosive reflux pattern esophagitis without Perry's.  Mid esophagus demonstrates eosinophilic esophagitis pattern of injury (up to 40 eosinophils per high-power field).    Assessment and recommendations:   40 yo man with history of eosinophilic esophagitis and autoimmune hepatitis with stage II fibrosis 11/1/2018 on 75 mg azathioprine who presents for evaluation of eosinophilic esophagitis.    #Eosinophilic esophagitis  In clinical remission while on pantoprazole.  Given the length of treatment with pantoprazole, it is very likely he is in histologic remission since he is asymptomatic.  It is reasonable to continue pantoprazole 40 mg indefinitely.  We had a long discussion about associations, causations, risks and benefits of these medications.  We also discussed elimination diet and steroid treatment.     It is also possible azathioprine is treating EoE- it is described in case reports. We discussed that it would be reasonable to repeat an EGD off PPI to determine this. He will think about it and get back to us.    - ok to stay on pantoprazole 40mg once per day indefinitely.  - also ok to wean pantoprazole- 20mg once per day x7days, then 20mg every other day x7days, then off.   - if he chooses the latter option, we will schedule an upper endoscopy 8 to 12 weeks after completely stopped panprazole (EGD under MAC) and repeat biopsies to see if the azathioprine is helping EoE or not.     RTC 12 months    41 minutes spent on the date of the encounter doing chart review, test interpretation, history and exam, documentation and further activities as noted above.    Gavin  MD Herrera    [facility]  Division of Gastroenterology  [phone]

## 2021-08-30 ENCOUNTER — TELEPHONE (OUTPATIENT)
Dept: GASTROENTEROLOGY | Facility: CLINIC | Age: 41
End: 2021-08-30

## 2021-08-30 NOTE — TELEPHONE ENCOUNTER
courtneym to schedule 1 yr follow up appt with Herrera. Left call center phone number and sent luciernat.

## 2021-10-02 ENCOUNTER — HEALTH MAINTENANCE LETTER (OUTPATIENT)
Age: 41
End: 2021-10-02

## 2021-10-27 DIAGNOSIS — K75.4 AUTOIMMUNE HEPATITIS (H): ICD-10-CM

## 2021-10-27 RX ORDER — AZATHIOPRINE 75 MG/1
75 TABLET ORAL DAILY
Qty: 30 TABLET | Refills: 1 | Status: SHIPPED | OUTPATIENT
Start: 2021-10-27 | End: 2021-11-05

## 2021-10-28 ENCOUNTER — TRANSFERRED RECORDS (OUTPATIENT)
Dept: HEALTH INFORMATION MANAGEMENT | Facility: CLINIC | Age: 41
End: 2021-10-28
Payer: COMMERCIAL

## 2021-11-01 ENCOUNTER — TELEPHONE (OUTPATIENT)
Dept: GASTROENTEROLOGY | Facility: CLINIC | Age: 41
End: 2021-11-01

## 2021-11-02 ENCOUNTER — LAB (OUTPATIENT)
Dept: LAB | Facility: CLINIC | Age: 41
End: 2021-11-02
Payer: COMMERCIAL

## 2021-11-02 DIAGNOSIS — K75.4 AUTOIMMUNE HEPATITIS (H): ICD-10-CM

## 2021-11-02 LAB
ERYTHROCYTE [DISTWIDTH] IN BLOOD BY AUTOMATED COUNT: 12.1 % (ref 10–15)
HCT VFR BLD AUTO: 43.9 % (ref 40–53)
HGB BLD-MCNC: 15.8 G/DL (ref 13.3–17.7)
MCH RBC QN AUTO: 33 PG (ref 26.5–33)
MCHC RBC AUTO-ENTMCNC: 36 G/DL (ref 31.5–36.5)
MCV RBC AUTO: 92 FL (ref 78–100)
PLATELET # BLD AUTO: 175 10E3/UL (ref 150–450)
RBC # BLD AUTO: 4.79 10E6/UL (ref 4.4–5.9)
WBC # BLD AUTO: 4.6 10E3/UL (ref 4–11)

## 2021-11-02 PROCEDURE — 80076 HEPATIC FUNCTION PANEL: CPT

## 2021-11-02 PROCEDURE — 36415 COLL VENOUS BLD VENIPUNCTURE: CPT

## 2021-11-02 PROCEDURE — 85027 COMPLETE CBC AUTOMATED: CPT

## 2021-11-03 LAB
ALBUMIN SERPL-MCNC: 4.1 G/DL (ref 3.4–5)
ALP SERPL-CCNC: 81 U/L (ref 40–150)
ALT SERPL W P-5'-P-CCNC: 18 U/L (ref 0–70)
AST SERPL W P-5'-P-CCNC: 12 U/L (ref 0–45)
BILIRUB DIRECT SERPL-MCNC: 0.1 MG/DL (ref 0–0.2)
BILIRUB SERPL-MCNC: 0.6 MG/DL (ref 0.2–1.3)
PROT SERPL-MCNC: 7.1 G/DL (ref 6.8–8.8)

## 2021-11-05 DIAGNOSIS — K75.4 AUTOIMMUNE HEPATITIS (H): ICD-10-CM

## 2021-11-05 RX ORDER — AZATHIOPRINE 75 MG/1
75 TABLET ORAL DAILY
Qty: 90 TABLET | Refills: 1 | Status: SHIPPED | OUTPATIENT
Start: 2021-11-05 | End: 2021-11-08

## 2021-11-08 ENCOUNTER — VIRTUAL VISIT (OUTPATIENT)
Dept: GASTROENTEROLOGY | Facility: CLINIC | Age: 41
End: 2021-11-08
Attending: PHYSICIAN ASSISTANT
Payer: COMMERCIAL

## 2021-11-08 DIAGNOSIS — K75.4 AUTOIMMUNE HEPATITIS (H): ICD-10-CM

## 2021-11-08 PROCEDURE — 99214 OFFICE O/P EST MOD 30 MIN: CPT | Mod: 95 | Performed by: PHYSICIAN ASSISTANT

## 2021-11-08 RX ORDER — AZATHIOPRINE 50 MG/1
50 TABLET ORAL DAILY
Qty: 90 TABLET | Refills: 3 | Status: SHIPPED | OUTPATIENT
Start: 2021-11-08 | End: 2022-12-21

## 2021-11-08 NOTE — PROGRESS NOTES
Adams is a 41 year old who is being evaluated via a billable video visit.       How would you like to obtain your AVS?[email or web address]     If the video visit is dropped, the invitation should be resent by: .[phone]    Will anyone else be joining your video visit? No     Video Start Time: 3:25 pm   Video-Visit Details    Type of service:  Video Visit    Video End Time: 3:45 pm     Originating Location (pt. Location): Work     Distant Location (provider location):  [facility] [place]     Platform used for Video Visit: [facility]     Hepatology Follow-up Clinic note  Satnam Gonzales   Date of Birth 1980  Date of Service 11/9/2021    Reason for follow-up: AIH          Assessment/plan:   Satnam Gonzales is a 41 year old male with history of AIH that has been well controlled, maintained on Azathiorpine 75 mg. Given stability of transaminases (recently ALT 18, AST 12), discussed reducing dose to 50 mg with close follow up of labs. Liver biopsy in 2018 showing Stage 2 Fibrosis. Liver function is otherwise normal and he has no stigmata of advanced liver disease.     - Decrease to 50 mg Azathioprine after next refill  - Repeat hepatic panel 3 months after change  - Continue CBC standing every 3 months   - Follow-up in clinic in one year    Nae Do PA-C   [facility] Hepatology clinic    -----------------------------------------------------       HPI:   Satnam Gonzales is a 41 year old male presenting for follow-up.     Autoimmune Hepatitis   Liver Biopsy on 11/1/2018: mildly active hepatitis with chronic features  Stage 2 fibrosis   F-actin - 56   Treatment:   -Prednisone taper started 11/2018, stayed on 2.5 mg since February 2019  -Azathioprine started 11/2018, increased to 75 mg in January 5, 2019    Patient was last seen by me on 6/16/2020. No recent hospitalizations or ER visits. No new medications.     His appetite is good. Weight is stable. He is having regular bowel movements.      Patient denies jaundice, lower extremity edema, abdominal distension or confusion.  Patient also denies melena, hematochezia or hematemesis. Patient denies weight loss, fevers, sweats or chills.    He does not drink alcohol.     Medical hx Surgical hx   Past Medical History:   Diagnosis Date     Autoimmune hepatitis (H)      Depression      Eosinophilic esophagitis     Past Surgical History:   Procedure Laterality Date     NO HISTORY OF SURGERY                   Medications:     Current Outpatient Medications   Medication     azaTHIOprine (IMURAN) 50 MG tablet     cetirizine (ZYRTEC) 10 MG CHEW     multivitamin, therapeutic with minerals (MULTI-VITAMIN) TABS tablet     pantoprazole (PROTONIX) 40 MG EC tablet     No current facility-administered medications for this visit.            Allergies:     Allergies   Allergen Reactions     Seasonal Allergies             Review of Systems:   10 points ROS was obtained and highlighted in the HPI, otherwise negative.          Physical Exam:     GENERAL: healthy, alert and no distress  EYES: Eyes grossly normal to inspection, conjunctivae and sclerae normal  RESP: no audible wheeze, cough, or visible cyanosis.  No visible retractions or increased work of breathing.  Able to speak fully in complete sentences  NEURO: Cranial nerves grossly intact, mentation intact and speech normal  PSYCH: mentation appears normal, affect normal/bright, judgement and insight intact, normal speech and appearance well-groomed         Data:   Reviewed in person and significant for:    Lab Results   Component Value Date     08/04/2020      Lab Results   Component Value Date    POTASSIUM 4.2 08/04/2020     Lab Results   Component Value Date    CHLORIDE 105 08/04/2020     Lab Results   Component Value Date    CO2 28 08/04/2020     Lab Results   Component Value Date    BUN 13 08/04/2020     Lab Results   Component Value Date    CR 1.05 08/04/2020       Lab Results   Component Value Date    WBC  4.6 11/02/2021    WBC 4.4 07/09/2021     Lab Results   Component Value Date    HGB 15.8 11/02/2021    HGB 15.6 07/09/2021     Lab Results   Component Value Date    HCT 43.9 11/02/2021    HCT 43.9 07/09/2021     Lab Results   Component Value Date    MCV 92 11/02/2021    MCV 91 07/09/2021     Lab Results   Component Value Date     11/02/2021     07/09/2021       Lab Results   Component Value Date    AST 12 11/02/2021    AST 16 07/09/2021     Lab Results   Component Value Date    ALT 18 11/02/2021    ALT 25 07/09/2021     No results found for: BILICONJ   Lab Results   Component Value Date    BILITOTAL 0.6 11/02/2021    BILITOTAL 0.9 07/09/2021       Lab Results   Component Value Date    ALBUMIN 4.1 11/02/2021    ALBUMIN 4.4 07/09/2021     Lab Results   Component Value Date    PROTTOTAL 7.1 11/02/2021    PROTTOTAL 7.0 07/09/2021      Lab Results   Component Value Date    ALKPHOS 81 11/02/2021    ALKPHOS 73 07/09/2021       Lab Results   Component Value Date    INR 1.05 08/04/2020     No recent abdominal imaging

## 2021-11-08 NOTE — LETTER
11/8/2021     RE: Satnam Gonzales  [address]  [facility] [address]    Dear Colleague,    Thank you for referring your patient, Satnam Gonzales, to the [facility]PAT[facility] [place]. Please see a copy of my visit note below.    Adams is a 41 year old who is being evaluated via a billable video visit.       How would you like to obtain your AVS?MyChart     If the video visit is dropped, the invitation should be resent by: .[phone]    Will anyone else be joining your video visit? No     Video Start Time: 3:25 pm   Video-Visit Details    Type of service:  Video Visit    Video End Time: 3:45 pm     Originating Location (pt. Location): Work     Distant Location (provider location):  [facility] HEPATOLOGY [facility]     Platform used for Video Visit: [employer]     Hepatology Follow-up Clinic note  Satnam Gonzales   Date of Birth 1980  Date of Service 11/9/2021    Reason for follow-up: AIH          Assessment/plan:   Satnam Gonzales is a 41 year old male with history of AIH that has been well controlled, maintained on Azathiorpine 75 mg. Given stability of transaminases (recently ALT 18, AST 12), discussed reducing dose to 50 mg with close follow up of labs. Liver biopsy in 2018 showing Stage 2 Fibrosis. Liver function is otherwise normal and he has no stigmata of advanced liver disease.     - Decrease to 50 mg Azathioprine after next refill  - Repeat hepatic panel 3 months after change  - Continue CBC standing every 3 months   - Follow-up in clinic in one year    Nae Do PA-C   [facility] Hepatology clinic    -----------------------------------------------------       HPI:   Satnam Gonzales is a 41 year old male presenting for follow-up.     Autoimmune Hepatitis   Liver Biopsy on 11/1/2018: mildly active hepatitis with chronic features  Stage 2 fibrosis   F-actin - 56   Treatment:   -Prednisone taper started 11/2018, stayed on 2.5 mg since February  2019  -Azathioprine started 11/2018, increased to 75 mg in January 5, 2019    Patient was last seen by me on 6/16/2020. No recent hospitalizations or ER visits. No new medications.     His appetite is good. Weight is stable. He is having regular bowel movements.     Patient denies jaundice, lower extremity edema, abdominal distension or confusion.  Patient also denies melena, hematochezia or hematemesis. Patient denies weight loss, fevers, sweats or chills.    He does not drink alcohol.     Medical hx Surgical hx   Past Medical History:   Diagnosis Date     Autoimmune hepatitis (H)      Depression      Eosinophilic esophagitis     Past Surgical History:   Procedure Laterality Date     NO HISTORY OF SURGERY               Medications:     Current Outpatient Medications   Medication     azaTHIOprine (IMURAN) 50 MG tablet     cetirizine (ZYRTEC) 10 MG CHEW     multivitamin, therapeutic with minerals (MULTI-VITAMIN) TABS tablet     pantoprazole (PROTONIX) 40 MG EC tablet     No current facility-administered medications for this visit.          Allergies:     Allergies   Allergen Reactions     Seasonal Allergies             Review of Systems:   10 points ROS was obtained and highlighted in the HPI, otherwise negative.          Physical Exam:     GENERAL: healthy, alert and no distress  EYES: Eyes grossly normal to inspection, conjunctivae and sclerae normal  RESP: no audible wheeze, cough, or visible cyanosis.  No visible retractions or increased work of breathing.  Able to speak fully in complete sentences  NEURO: Cranial nerves grossly intact, mentation intact and speech normal  PSYCH: mentation appears normal, affect normal/bright, judgement and insight intact, normal speech and appearance well-groomed         Data:   Reviewed in person and significant for:    Lab Results   Component Value Date     08/04/2020      Lab Results   Component Value Date    POTASSIUM 4.2 08/04/2020     Lab Results   Component Value  Date    CHLORIDE 105 08/04/2020     Lab Results   Component Value Date    CO2 28 08/04/2020     Lab Results   Component Value Date    BUN 13 08/04/2020     Lab Results   Component Value Date    CR 1.05 08/04/2020     Lab Results   Component Value Date    WBC 4.6 11/02/2021    WBC 4.4 07/09/2021     Lab Results   Component Value Date    HGB 15.8 11/02/2021    HGB 15.6 07/09/2021     Lab Results   Component Value Date    HCT 43.9 11/02/2021    HCT 43.9 07/09/2021     Lab Results   Component Value Date    MCV 92 11/02/2021    MCV 91 07/09/2021     Lab Results   Component Value Date     11/02/2021     07/09/2021     Lab Results   Component Value Date    AST 12 11/02/2021    AST 16 07/09/2021     Lab Results   Component Value Date    ALT 18 11/02/2021    ALT 25 07/09/2021     No results found for: BILICONJ   Lab Results   Component Value Date    BILITOTAL 0.6 11/02/2021    BILITOTAL 0.9 07/09/2021       Lab Results   Component Value Date    ALBUMIN 4.1 11/02/2021    ALBUMIN 4.4 07/09/2021     Lab Results   Component Value Date    PROTTOTAL 7.1 11/02/2021    PROTTOTAL 7.0 07/09/2021      Lab Results   Component Value Date    ALKPHOS 81 11/02/2021    ALKPHOS 73 07/09/2021       Lab Results   Component Value Date    INR 1.05 08/04/2020     No recent abdominal imaging     Again, thank you for allowing me to participate in the care of your patient.      Sincerely,      Nae Do PA-C

## 2021-11-08 NOTE — Clinical Note
Please call patient and help schedule an appointment 3 months after he decreases his medication dose.   Follow up in one year.   Thanks, GG

## 2022-04-01 ENCOUNTER — LAB (OUTPATIENT)
Dept: LAB | Facility: CLINIC | Age: 42
End: 2022-04-01
Payer: COMMERCIAL

## 2022-04-01 DIAGNOSIS — K75.4 AUTOIMMUNE HEPATITIS (H): ICD-10-CM

## 2022-04-01 LAB
ERYTHROCYTE [DISTWIDTH] IN BLOOD BY AUTOMATED COUNT: 11.9 % (ref 10–15)
HCT VFR BLD AUTO: 46 % (ref 40–53)
HGB BLD-MCNC: 16.3 G/DL (ref 13.3–17.7)
MCH RBC QN AUTO: 32.1 PG (ref 26.5–33)
MCHC RBC AUTO-ENTMCNC: 35.4 G/DL (ref 31.5–36.5)
MCV RBC AUTO: 91 FL (ref 78–100)
PLATELET # BLD AUTO: 171 10E3/UL (ref 150–450)
RBC # BLD AUTO: 5.07 10E6/UL (ref 4.4–5.9)
WBC # BLD AUTO: 4.5 10E3/UL (ref 4–11)

## 2022-04-01 PROCEDURE — 85027 COMPLETE CBC AUTOMATED: CPT

## 2022-04-01 PROCEDURE — 36415 COLL VENOUS BLD VENIPUNCTURE: CPT

## 2022-04-01 PROCEDURE — 80076 HEPATIC FUNCTION PANEL: CPT

## 2022-04-03 LAB
ALBUMIN SERPL-MCNC: 4.2 G/DL (ref 3.4–5)
ALP SERPL-CCNC: 78 U/L (ref 40–150)
ALT SERPL W P-5'-P-CCNC: 28 U/L (ref 0–70)
AST SERPL W P-5'-P-CCNC: 16 U/L (ref 0–45)
BILIRUB DIRECT SERPL-MCNC: 0.2 MG/DL (ref 0–0.2)
BILIRUB SERPL-MCNC: 0.8 MG/DL (ref 0.2–1.3)
PROT SERPL-MCNC: 7.4 G/DL (ref 6.8–8.8)

## 2022-04-20 ENCOUNTER — OFFICE VISIT (OUTPATIENT)
Dept: FAMILY MEDICINE | Facility: CLINIC | Age: 42
End: 2022-04-20
Payer: COMMERCIAL

## 2022-04-20 VITALS
TEMPERATURE: 97.7 F | SYSTOLIC BLOOD PRESSURE: 121 MMHG | BODY MASS INDEX: 27.4 KG/M2 | WEIGHT: 185 LBS | DIASTOLIC BLOOD PRESSURE: 77 MMHG | RESPIRATION RATE: 16 BRPM | HEIGHT: 69 IN | OXYGEN SATURATION: 98 % | HEART RATE: 55 BPM

## 2022-04-20 DIAGNOSIS — Z00.00 ROUTINE HISTORY AND PHYSICAL EXAMINATION OF ADULT: Primary | ICD-10-CM

## 2022-04-20 DIAGNOSIS — J30.2 SEASONAL ALLERGIC RHINITIS, UNSPECIFIED TRIGGER: ICD-10-CM

## 2022-04-20 DIAGNOSIS — K20.0 EOSINOPHILIC ESOPHAGITIS: ICD-10-CM

## 2022-04-20 PROCEDURE — 99396 PREV VISIT EST AGE 40-64: CPT | Performed by: INTERNAL MEDICINE

## 2022-04-20 RX ORDER — PANTOPRAZOLE SODIUM 40 MG/1
40 TABLET, DELAYED RELEASE ORAL DAILY
Qty: 90 TABLET | Refills: 3 | Status: SHIPPED | OUTPATIENT
Start: 2022-04-20 | End: 2023-04-28

## 2022-04-20 ASSESSMENT — PAIN SCALES - GENERAL: PAINLEVEL: NO PAIN (0)

## 2022-04-20 NOTE — PROGRESS NOTES
Answers for HPI/ROS submitted by the patient on 4/20/2022  How many servings of fruits and vegetables do you eat daily?: 2-3  On average, how many sweetened beverages do you drink each day (Examples: soda, juice, sweet tea, etc.  Do NOT count diet or artificially sweetened beverages)?: 0  How many minutes a day do you exercise enough to make your heart beat faster?: 20 to 29  How many days a week do you exercise enough to make your heart beat faster?: 3 or less  How many days per week do you miss taking your medication?: 0  What is the reason for your visit today?: Check up and medication renewal  What are your symptoms?: None

## 2022-04-20 NOTE — PROGRESS NOTES
SUBJECTIVE:   CC: Satnam Gonzales is an 42 year old male who presents for preventative health visit.     Patient has been advised of split billing requirements and indicates understanding: Yes  History of Present Illness       Reason for visit:  Check up and medication renewal  Symptoms include:  None    He eats 2-3 servings of fruits and vegetables daily.He consumes 0 sweetened beverage(s) daily.He exercises with enough effort to increase his heart rate 20 to 29 minutes per day.  He exercises with enough effort to increase his heart rate 3 or less days per week.   He is taking medications regularly.  He is not taking prescribed medications regularly due to Not applicable.  Healthy Habits:     Getting at least 3 servings of Calcium per day:  Yes    Bi-annual eye exam:  Yes    Dental care twice a year:  Yes    Sleep apnea or symptoms of sleep apnea:  None    Diet:  Regular (no restrictions)    Frequency of exercise:  4-5 days/week    Duration of exercise:  30-45 minutes    Taking medications regularly:  0    Barriers to taking medications:  Not applicable    Medication side effects:  None    PHQ-2 Total Score: 0    Additional concerns today:  No    Ability to successfully perform activities of daily living: Yes, no assistance needed  Home safety:  none identified   Hearing impairment: none        Today's PHQ-2 Score:   PHQ-2 ( 1999 [employer]) 4/20/2022   Q1: Little interest or pleasure in doing things 0   Q2: Feeling down, depressed or hopeless 0   PHQ-2 Score 0   PHQ-2 Total Score (12-17 Years)- Positive if 3 or more points; Administer PHQ-A if positive -   Q1: Little interest or pleasure in doing things Not at all   Q2: Feeling down, depressed or hopeless Not at all   PHQ-2 Score 0       Abuse: Current or Past(Physical, Sexual or Emotional)- No  Do you feel safe in your environment? Yes    Have you ever done Advance Care Planning? (For example, a Health Directive, POLST, or a discussion with a medical provider or your  loved ones about your wishes): No, advance care planning information given to patient to review.  Patient plans to discuss their wishes with loved ones or provider.      Social History     Tobacco Use     Smoking status: Former Smoker     Packs/day: 1.00     Years: 10.00     Pack years: 10.00     Smokeless tobacco: Former User     Types: Chew   Substance Use Topics     Alcohol use: No     If you drink alcohol do you typically have >3 drinks per day or >7 drinks per week? No    Alcohol Use 8/3/2017   Prescreen: >3 drinks/day or >7 drinks/week? The patient does not drink >3 drinks per day nor >7 drinks per week.     Last PSA: No results found for: PSA    Reviewed orders with patient. Reviewed health maintenance and updated orders accordingly - Yes  Labs reviewed in EPIC    Reviewed and updated as needed this visit by clinical staff   Tobacco   Meds                Reviewed and updated as needed this visit by Provider                   Past Medical History:   Diagnosis Date     Autoimmune hepatitis (H)      Depression      Eosinophilic esophagitis         Review of Systems  CONSTITUTIONAL: NEGATIVE for fever, chills, change in weight  INTEGUMENTARY/SKIN: NEGATIVE for worrisome rashes, moles or lesions  EYES: NEGATIVE for vision changes or irritation  ENT: NEGATIVE for ear, mouth and throat problems  RESP: NEGATIVE for significant cough or SOB  CV: NEGATIVE for chest pain, palpitations or peripheral edema  GI: NEGATIVE for nausea, abdominal pain, heartburn, or change in bowel habits   male: negative for dysuria, hematuria, decreased urinary stream, erectile dysfunction, urethral discharge  MUSCULOSKELETAL: NEGATIVE for significant arthralgias or myalgia  NEURO: NEGATIVE for weakness, dizziness or paresthesias  PSYCHIATRIC: NEGATIVE for changes in mood or affect    OBJECTIVE:   /77 (BP Location: Left arm, Patient Position: Sitting, Cuff Size: Adult Regular)   Pulse 55   Temp 97.7  F (36.5  C) (Temporal)    "Resp 16   Ht 1.753 m (5' 9\")   Wt 83.9 kg (185 lb)   SpO2 98%   BMI 27.32 kg/m      Physical Exam  GENERAL: alert and no distress  EYES: Eyes grossly normal to inspection, PERRL and conjunctivae and sclerae normal  HENT: nose and mouth without ulcers or lesions  NECK: no adenopathy, no asymmetry, masses, or scars and thyroid normal to palpation  RESP: lungs clear to auscultation - no rales, rhonchi or wheezes  CV: regular rate and rhythm  ABDOMEN: soft, nontender, no hepatosplenomegaly, no masses and bowel sounds normal  MS: no gross musculoskeletal defects noted, no edema  SKIN: no suspicious lesions or rashes  NEURO: Normal strength and tone, mentation intact and speech normal  PSYCH: mentation appears normal, affect normal/bright    Diagnostic Test Results:  Labs reviewed in Epic    ASSESSMENT/PLAN:   Satnam was seen today for recheck and physical.    Diagnoses and all orders for this visit:    Routine history and physical examination of adult    Eosinophilic esophagitis  -     pantoprazole (PROTONIX) 40 MG EC tablet; Take 1 tablet (40 mg) by mouth daily Take 30-60 minutes before a meal.    Seasonal allergic rhinitis, unspecified trigger  -     loratadine-pseudoePHEDrine (CLARITIN-D 24-HOUR)  MG 24 hr tablet; Take 1 tablet by mouth daily        Patient has been advised of split billing requirements and indicates understanding: Yes    COUNSELING:   Reviewed preventive health counseling, as reflected in patient instructions  Special attention given to:        Regular exercise       Healthy diet/nutrition    Estimated body mass index is 27.32 kg/m  as calculated from the following:    Height as of this encounter: 1.753 m (5' 9\").    Weight as of this encounter: 83.9 kg (185 lb).     Weight management plan: Discussed healthy diet and exercise guidelines    He reports that he has quit smoking. He has a 10.00 pack-year smoking history. He has quit using smokeless tobacco.  His smokeless tobacco use included " chew.      Counseling Resources:  ATP IV Guidelines  Pooled Cohorts Equation Calculator  FRAX Risk Assessment  ICSI Preventive Guidelines  Dietary Guidelines for Americans, 2010  USDA's MyPlate  ASA Prophylaxis  Lung CA Screening    Chel Beaulieu MD  [facility]

## 2022-09-03 ENCOUNTER — HEALTH MAINTENANCE LETTER (OUTPATIENT)
Age: 42
End: 2022-09-03

## 2022-12-17 DIAGNOSIS — K75.4 AUTOIMMUNE HEPATITIS (H): ICD-10-CM

## 2022-12-21 DIAGNOSIS — K75.4 AUTOIMMUNE HEPATITIS (H): ICD-10-CM

## 2022-12-21 RX ORDER — AZATHIOPRINE 50 MG/1
50 TABLET ORAL DAILY
Qty: 90 TABLET | Refills: 0 | Status: SHIPPED | OUTPATIENT
Start: 2022-12-21 | End: 2023-03-09

## 2022-12-21 RX ORDER — AZATHIOPRINE 50 MG/1
TABLET ORAL
Qty: 90 TABLET | Refills: 3 | OUTPATIENT
Start: 2022-12-21

## 2023-01-09 ENCOUNTER — VIRTUAL VISIT (OUTPATIENT)
Dept: FAMILY MEDICINE | Facility: CLINIC | Age: 43
End: 2023-01-09
Payer: COMMERCIAL

## 2023-01-09 DIAGNOSIS — J06.9 UPPER RESPIRATORY TRACT INFECTION, UNSPECIFIED TYPE: ICD-10-CM

## 2023-01-09 DIAGNOSIS — R05.1 ACUTE COUGH: ICD-10-CM

## 2023-01-09 DIAGNOSIS — J30.2 SEASONAL ALLERGIC RHINITIS, UNSPECIFIED TRIGGER: Primary | ICD-10-CM

## 2023-01-09 DIAGNOSIS — R09.81 NASAL CONGESTION: ICD-10-CM

## 2023-01-09 DIAGNOSIS — R53.81 MALAISE: ICD-10-CM

## 2023-01-09 DIAGNOSIS — J02.9 SORE THROAT: ICD-10-CM

## 2023-01-09 DIAGNOSIS — K20.0 EOSINOPHILIC ESOPHAGITIS: ICD-10-CM

## 2023-01-09 PROCEDURE — 99214 OFFICE O/P EST MOD 30 MIN: CPT | Mod: 95 | Performed by: INTERNAL MEDICINE

## 2023-01-09 RX ORDER — METHYLPREDNISOLONE 4 MG
TABLET, DOSE PACK ORAL
Qty: 21 TABLET | Refills: 2 | Status: SHIPPED | OUTPATIENT
Start: 2023-01-09 | End: 2023-04-18

## 2023-01-09 RX ORDER — AZITHROMYCIN 250 MG/1
TABLET, FILM COATED ORAL
Qty: 6 TABLET | Refills: 0 | Status: SHIPPED | OUTPATIENT
Start: 2023-01-09 | End: 2023-01-14

## 2023-01-09 RX ORDER — GUAIFENESIN/DEXTROMETHORPHAN 100-10MG/5
10 SYRUP ORAL EVERY 4 HOURS PRN
Qty: 354 ML | Refills: 3 | Status: SHIPPED | OUTPATIENT
Start: 2023-01-09 | End: 2023-04-18

## 2023-01-09 NOTE — PROGRESS NOTES
Adams is a 42 year old who is being evaluated via a billable video visit.      How would you like to obtain your AVS? Dano  If the video visit is dropped, the invitation should be resent by: Dano   Will anyone else be joining your video visit? No      Subjective   Adams is a 42 year old presenting for the following health issues:    Cough, URI symptoms    History of Present Illness       Reason for visit:  Cold that has last 3 weeks.  Symptom onset:  3-4 weeks ago  Symptoms include:  Headaches, congestion, runny nose, lethargic, cough  Symptom intensity:  Moderate  Symptom progression:  Worsening  Had these symptoms before:  Yes  Has tried/received treatment for these symptoms:  No    He eats 2-3 servings of fruits and vegetables daily.He consumes 1 sweetened beverage(s) daily.He exercises with enough effort to increase his heart rate 10 to 19 minutes per day.  He exercises with enough effort to increase his heart rate 3 or less days per week.   He is taking medications regularly.         Current Medications:     Current Outpatient Medications   Medication Sig Dispense Refill     azaTHIOprine (IMURAN) 50 MG tablet Take 1 tablet (50 mg) by mouth daily Need appt for further refills 90 tablet 0     azithromycin (ZITHROMAX) 250 MG tablet Take 2 tablets (500 mg) by mouth daily for 1 day, THEN 1 tablet (250 mg) daily for 4 days. 6 tablet 0     cetirizine (ZYRTEC) 10 MG CHEW Take 1 tablet (10 mg) by mouth daily 90 tablet 3     guaiFENesin-dextromethorphan (ROBITUSSIN DM) 100-10 MG/5ML syrup Take 10 mLs by mouth every 4 hours as needed for cough or congestion 354 mL 3     loratadine-pseudoePHEDrine (CLARITIN-D 24-HOUR)  MG 24 hr tablet Take 1 tablet by mouth daily 30 tablet 2     methylPREDNISolone (MEDROL DOSEPAK) 4 MG tablet therapy pack Follow Package Directions 21 tablet 2     pantoprazole (PROTONIX) 40 MG EC tablet Take 1 tablet (40 mg) by mouth daily Take 30-60 minutes before a meal. 90 tablet 3          Allergies:      Allergies   Allergen Reactions     Seasonal Allergies             Past Medical History:     Past Medical History:   Diagnosis Date     Autoimmune hepatitis (H)      Depression      Eosinophilic esophagitis          Past Surgical History:     Past Surgical History:   Procedure Laterality Date     NO HISTORY OF SURGERY           Family Medical History:     Family History   Problem Relation Age of Onset     C.A.D. Paternal Grandfather 50     Alzheimer Disease Maternal Grandfather 73     Food Allergy Father      Seasonal/Environmental Allergies Brother          Social History:     Social History     Socioeconomic History     Marital status:      Spouse name: Not on file     Number of children: Not on file     Years of education: Not on file     Highest education level: Not on file   Occupational History     Not on file   Tobacco Use     Smoking status: Former     Packs/day: 1.00     Years: 10.00     Pack years: 10.00     Types: Cigarettes     Smokeless tobacco: Former     Types: Chew   Substance and Sexual Activity     Alcohol use: No     Drug use: No     Sexual activity: Yes     Partners: Female   Other Topics Concern     Parent/sibling w/ CABG, MI or angioplasty before 65F 55M? Not Asked   Social History Narrative     Not on file     Social Determinants of Health     Financial Resource Strain: Not on file   Food Insecurity: Not on file   Transportation Needs: Not on file   Physical Activity: Not on file   Stress: Not on file   Social Connections: Not on file   Intimate Partner Violence: Not on file   Housing Stability: Not on file           Review of System:     Constitutional: Negative for fever or chills  Skin: Negative for rashes  Ears/Nose/Throat: positive for nasal congestion, sore throat  Respiratory: positive for recent URI, cough  Cardiovascular: Negative for chest pain  Gastrointestinal: Negative for nausea, vomiting  Genitourinary: Negative for dysuria,  hematuria  Musculoskeletal: Negative for myalgias  Neurologic: Negative for headaches  Psychiatric: Negative for depression, anxiety  Hematologic/Lymphatic/Immunologic: Negative  Endocrine: Negative  Behavioral: Negative for tobacco use       Physical Exam:   There were no vitals taken for this visit.    GENERAL: alert and no distress  EYES: eyes grossly normal to inspection, and conjunctivae and sclerae normal  HENT: Normocephalic atraumatic. Nose and mouth without ulcers or lesions  NECK: supple  RESP: cough present  CV: patient appears to be hemodynamically stable  NEURO: Alert & Oriented x 3.   PSYCH: mentation appears normal, affect normal        Diagnostic Test Results:     Diagnostic Test Results:  Labs reviewed in Epic    ASSESSMENT/PLAN:       Satnam was seen today for uri.    Diagnoses and all orders for this visit:    Seasonal allergic rhinitis, unspecified trigger  -     methylPREDNISolone (MEDROL DOSEPAK) 4 MG tablet therapy pack; Follow Package Directions  -     azithromycin (ZITHROMAX) 250 MG tablet; Take 2 tablets (500 mg) by mouth daily for 1 day, THEN 1 tablet (250 mg) daily for 4 days.  -     guaiFENesin-dextromethorphan (ROBITUSSIN DM) 100-10 MG/5ML syrup; Take 10 mLs by mouth every 4 hours as needed for cough or congestion    Upper respiratory tract infection, unspecified type  -     methylPREDNISolone (MEDROL DOSEPAK) 4 MG tablet therapy pack; Follow Package Directions  -     azithromycin (ZITHROMAX) 250 MG tablet; Take 2 tablets (500 mg) by mouth daily for 1 day, THEN 1 tablet (250 mg) daily for 4 days.  -     guaiFENesin-dextromethorphan (ROBITUSSIN DM) 100-10 MG/5ML syrup; Take 10 mLs by mouth every 4 hours as needed for cough or congestion    Acute cough    Malaise    Nasal congestion    Sore throat    Eosinophilic esophagitis  - stable, continue current therapy    Other orders  -     REVIEW OF HEALTH MAINTENANCE PROTOCOL ORDERS            Follow Up Plan:     Patient is instructed to return  to Internal Medicine clinic for follow-up visit in 1 week.        Chel Beaulieu MD  Internal Medicine  [facility]    Video-Visit Details    Type of service:  Video Visit     Originating Location (pt. Location): Home  Distant Location (provider location):  Off-site  Platform used for Video Visit: Sp

## 2023-01-21 DIAGNOSIS — K75.4 AUTOIMMUNE HEPATITIS (H): ICD-10-CM

## 2023-01-23 RX ORDER — AZATHIOPRINE 50 MG/1
50 TABLET ORAL DAILY
Qty: 90 TABLET | Refills: 0 | OUTPATIENT
Start: 2023-01-23

## 2023-01-26 ENCOUNTER — OFFICE VISIT (OUTPATIENT)
Dept: FAMILY MEDICINE | Facility: CLINIC | Age: 43
End: 2023-01-26
Payer: COMMERCIAL

## 2023-01-26 ENCOUNTER — ANCILLARY PROCEDURE (OUTPATIENT)
Dept: GENERAL RADIOLOGY | Facility: CLINIC | Age: 43
End: 2023-01-26
Attending: INTERNAL MEDICINE
Payer: COMMERCIAL

## 2023-01-26 VITALS
HEIGHT: 69 IN | SYSTOLIC BLOOD PRESSURE: 121 MMHG | RESPIRATION RATE: 19 BRPM | DIASTOLIC BLOOD PRESSURE: 82 MMHG | OXYGEN SATURATION: 96 % | HEART RATE: 68 BPM | TEMPERATURE: 97.7 F | WEIGHT: 191.7 LBS | BODY MASS INDEX: 28.39 KG/M2

## 2023-01-26 DIAGNOSIS — Z11.4 SCREENING FOR HIV (HUMAN IMMUNODEFICIENCY VIRUS): ICD-10-CM

## 2023-01-26 DIAGNOSIS — J06.9 UPPER RESPIRATORY TRACT INFECTION, UNSPECIFIED TYPE: ICD-10-CM

## 2023-01-26 DIAGNOSIS — R05.2 SUBACUTE COUGH: ICD-10-CM

## 2023-01-26 DIAGNOSIS — J30.2 SEASONAL ALLERGIC RHINITIS, UNSPECIFIED TRIGGER: ICD-10-CM

## 2023-01-26 DIAGNOSIS — K20.0 EOSINOPHILIC ESOPHAGITIS: ICD-10-CM

## 2023-01-26 DIAGNOSIS — R05.2 SUBACUTE COUGH: Primary | ICD-10-CM

## 2023-01-26 PROCEDURE — 71046 X-RAY EXAM CHEST 2 VIEWS: CPT | Mod: TC | Performed by: RADIOLOGY

## 2023-01-26 PROCEDURE — 99214 OFFICE O/P EST MOD 30 MIN: CPT | Performed by: INTERNAL MEDICINE

## 2023-01-26 RX ORDER — PREDNISONE 20 MG/1
TABLET ORAL
Qty: 20 TABLET | Refills: 0 | Status: SHIPPED | OUTPATIENT
Start: 2023-01-26 | End: 2023-04-18

## 2023-01-26 ASSESSMENT — PAIN SCALES - GENERAL: PAINLEVEL: NO PAIN (0)

## 2023-01-26 NOTE — PROGRESS NOTES
Lluvia Lamas is a 42 year old presenting for the following health issues:      Cough    History of Present Illness     Reason for visit:  I ve been sick for 5+ weeks. I cannot get rid of this cough    He eats 2-3 servings of fruits and vegetables daily.He consumes 1 sweetened beverage(s) daily.He exercises with enough effort to increase his heart rate 9 or less minutes per day.  He exercises with enough effort to increase his heart rate 3 or less days per week.   He is taking medications regularly.      Current Medications:     Current Outpatient Medications   Medication Sig Dispense Refill     azaTHIOprine (IMURAN) 50 MG tablet Take 1 tablet (50 mg) by mouth daily Need appt for further refills 90 tablet 0     cetirizine (ZYRTEC) 10 MG CHEW Take 1 tablet (10 mg) by mouth daily 90 tablet 3     guaiFENesin-dextromethorphan (ROBITUSSIN DM) 100-10 MG/5ML syrup Take 10 mLs by mouth every 4 hours as needed for cough or congestion 354 mL 3     loratadine-pseudoePHEDrine (CLARITIN-D 24-HOUR)  MG 24 hr tablet Take 1 tablet by mouth daily 30 tablet 2     loratadine-pseudoePHEDrine (CLARITIN-D 24-HOUR)  MG 24 hr tablet Take 1 tablet by mouth daily 30 tablet 2     methylPREDNISolone (MEDROL DOSEPAK) 4 MG tablet therapy pack Follow Package Directions 21 tablet 2     pantoprazole (PROTONIX) 40 MG EC tablet Take 1 tablet (40 mg) by mouth daily Take 30-60 minutes before a meal. 90 tablet 3     predniSONE (DELTASONE) 20 MG tablet Take 3 tabs by mouth daily x 3 days, then 2 tabs daily x 3 days, then 1 tab daily x 3 days, then 1/2 tab daily x 3 days. 20 tablet 0         Allergies:      Allergies   Allergen Reactions     Seasonal Allergies             Past Medical History:     Past Medical History:   Diagnosis Date     Autoimmune hepatitis (H)      Depression      Eosinophilic esophagitis          Past Surgical History:     Past Surgical History:   Procedure Laterality Date     NO HISTORY OF SURGERY           Family  Medical History:     Family History   Problem Relation Age of Onset     C.A.D. Paternal Grandfather 50     Alzheimer Disease Maternal Grandfather 73     Food Allergy Father      Seasonal/Environmental Allergies Brother          Social History:     Social History     Socioeconomic History     Marital status:      Spouse name: Not on file     Number of children: Not on file     Years of education: Not on file     Highest education level: Not on file   Occupational History     Not on file   Tobacco Use     Smoking status: Former     Packs/day: 1.00     Years: 10.00     Pack years: 10.00     Types: Cigarettes     Smokeless tobacco: Former     Types: Chew   Vaping Use     Vaping Use: Never used   Substance and Sexual Activity     Alcohol use: No     Drug use: No     Sexual activity: Yes     Partners: Female   Other Topics Concern     Parent/sibling w/ CABG, MI or angioplasty before 65F 55M? Not Asked   Social History Narrative     Not on file     Social Determinants of Health     Financial Resource Strain: Not on file   Food Insecurity: Not on file   Transportation Needs: Not on file   Physical Activity: Not on file   Stress: Not on file   Social Connections: Not on file   Intimate Partner Violence: Not on file   Housing Stability: Not on file           Review of System:     Constitutional: Negative for fever or chills  Skin: Negative for rashes  Ears/Nose/Throat: positive for nasal congestion, sore throat  Respiratory: positive for cough  Cardiovascular: Negative for chest pain  Gastrointestinal: Negative for nausea, vomiting  Genitourinary: Negative for dysuria, hematuria  Musculoskeletal: Negative for myalgias  Neurologic: Negative for headaches  Psychiatric: Negative for depression, anxiety  Hematologic/Lymphatic/Immunologic: Negative  Endocrine: Negative  Behavioral: Negative for tobacco use       Physical Exam:   /82 (BP Location: Right arm, Patient Position: Sitting, Cuff Size: Adult Regular)   Pulse  "68   Temp 97.7  F (36.5  C) (Oral)   Resp 19   Ht 1.753 m (5' 9\")   Wt 87 kg (191 lb 11.2 oz)   SpO2 96%   BMI 28.31 kg/m      GENERAL:  alert and no distress  EYES: eyes grossly normal to inspection, and conjunctivae and sclerae normal  HENT: Normocephalic atraumatic. Nose and mouth without ulcers or lesions, nasal congestion present  NECK: supple  RESP: dry cough present  CV: regular rate and rhythm, normal S1 S2  LYMPH: no peripheral edema   ABDOMEN: nondistended  MS: no gross musculoskeletal defects noted  SKIN: no suspicious lesions or rashes  NEURO: Alert & Oriented x 3.   PSYCH: mentation appears normal, affect normal        Diagnostic Test Results:     Diagnostic Test Results:  Labs reviewed in Epic    Results for orders placed or performed in visit on 01/26/23   XR Chest 2 Views     Status: None    Narrative    CHEST TWO VIEWS 1/26/2023 10:06 AM     HISTORY: Subacute cough. Upper respiratory tract infection,  unspecified type.    COMPARISON: October 10, 2019.       Impression    IMPRESSION: There are no acute infiltrates. The cardiac silhouette is  not enlarged. Pulmonary vasculature is unremarkable.    ROD OSWALD MD         SYSTEM ID:  [id number]         ASSESSMENT/PLAN:       Satnam was seen today for cough.    Diagnoses and all orders for this visit:    Subacute cough  -     XR Chest 2 Views; Future    Screening for HIV (human immunodeficiency virus)    Upper respiratory tract infection, unspecified type  -     XR Chest 2 Views; Future  -     predniSONE (DELTASONE) 20 MG tablet; Take 3 tabs by mouth daily x 3 days, then 2 tabs daily x 3 days, then 1 tab daily x 3 days, then 1/2 tab daily x 3 days.  -     loratadine-pseudoePHEDrine (CLARITIN-D 24-HOUR)  MG 24 hr tablet; Take 1 tablet by mouth daily    Seasonal allergic rhinitis, unspecified trigger  -     predniSONE (DELTASONE) 20 MG tablet; Take 3 tabs by mouth daily x 3 days, then 2 tabs daily x 3 days, then 1 tab daily x 3 days, then 1/2 " tab daily x 3 days.  -     loratadine-pseudoePHEDrine (CLARITIN-D 24-HOUR)  MG 24 hr tablet; Take 1 tablet by mouth daily    Eosinophilic esophagitis    - stable, continue current therapy        Follow Up Plan:     Patient is instructed to return to Internal Medicine clinic for follow-up visit in 1 month.        Chel Beaulieu MD  Internal Medicine  [facility]

## 2023-02-07 ENCOUNTER — MYC MEDICAL ADVICE (OUTPATIENT)
Dept: FAMILY MEDICINE | Facility: CLINIC | Age: 43
End: 2023-02-07
Payer: COMMERCIAL

## 2023-02-07 DIAGNOSIS — J30.2 SEASONAL ALLERGIC RHINITIS, UNSPECIFIED TRIGGER: ICD-10-CM

## 2023-02-07 DIAGNOSIS — J06.9 UPPER RESPIRATORY TRACT INFECTION, UNSPECIFIED TYPE: Primary | ICD-10-CM

## 2023-02-07 RX ORDER — PREDNISONE 20 MG/1
TABLET ORAL
Qty: 20 TABLET | Refills: 1 | Status: SHIPPED | OUTPATIENT
Start: 2023-02-07 | End: 2023-04-18

## 2023-02-07 RX ORDER — AZITHROMYCIN 250 MG/1
TABLET, FILM COATED ORAL
Qty: 6 TABLET | Refills: 0 | Status: SHIPPED | OUTPATIENT
Start: 2023-02-07 | End: 2023-02-12

## 2023-02-07 NOTE — TELEPHONE ENCOUNTER
Returned call, spoke to pt by phone today. restarted prednisone, azithromycin for recurrent sinusitis.

## 2023-03-09 DIAGNOSIS — K75.4 AUTOIMMUNE HEPATITIS (H): Primary | ICD-10-CM

## 2023-03-09 RX ORDER — AZATHIOPRINE 50 MG/1
50 TABLET ORAL DAILY
Qty: 60 TABLET | Refills: 0 | Status: SHIPPED | OUTPATIENT
Start: 2023-03-09 | End: 2023-04-17

## 2023-04-17 ENCOUNTER — LAB (OUTPATIENT)
Dept: LAB | Facility: CLINIC | Age: 43
End: 2023-04-17
Payer: COMMERCIAL

## 2023-04-17 ENCOUNTER — OFFICE VISIT (OUTPATIENT)
Dept: GASTROENTEROLOGY | Facility: CLINIC | Age: 43
End: 2023-04-17
Attending: PHYSICIAN ASSISTANT
Payer: COMMERCIAL

## 2023-04-17 VITALS
SYSTOLIC BLOOD PRESSURE: 103 MMHG | DIASTOLIC BLOOD PRESSURE: 73 MMHG | OXYGEN SATURATION: 98 % | HEART RATE: 71 BPM | BODY MASS INDEX: 28.57 KG/M2 | WEIGHT: 193.5 LBS | TEMPERATURE: 97.8 F

## 2023-04-17 DIAGNOSIS — K75.4 AUTOIMMUNE HEPATITIS (H): ICD-10-CM

## 2023-04-17 LAB
ALBUMIN SERPL BCG-MCNC: 4.6 G/DL (ref 3.5–5.2)
ALP SERPL-CCNC: 81 U/L (ref 40–129)
ALT SERPL W P-5'-P-CCNC: 19 U/L (ref 10–50)
AST SERPL W P-5'-P-CCNC: 20 U/L (ref 10–50)
BILIRUB DIRECT SERPL-MCNC: <0.2 MG/DL (ref 0–0.3)
BILIRUB SERPL-MCNC: 0.7 MG/DL
ERYTHROCYTE [DISTWIDTH] IN BLOOD BY AUTOMATED COUNT: 11.9 % (ref 10–15)
HCT VFR BLD AUTO: 47.8 % (ref 40–53)
HGB BLD-MCNC: 16.7 G/DL (ref 13.3–17.7)
MCH RBC QN AUTO: 30.8 PG (ref 26.5–33)
MCHC RBC AUTO-ENTMCNC: 34.9 G/DL (ref 31.5–36.5)
MCV RBC AUTO: 88 FL (ref 78–100)
PLATELET # BLD AUTO: 205 10E3/UL (ref 150–450)
PROT SERPL-MCNC: 7.2 G/DL (ref 6.4–8.3)
RBC # BLD AUTO: 5.43 10E6/UL (ref 4.4–5.9)
WBC # BLD AUTO: 9.2 10E3/UL (ref 4–11)

## 2023-04-17 PROCEDURE — 80076 HEPATIC FUNCTION PANEL: CPT | Performed by: PATHOLOGY

## 2023-04-17 PROCEDURE — G0463 HOSPITAL OUTPT CLINIC VISIT: HCPCS | Performed by: PHYSICIAN ASSISTANT

## 2023-04-17 PROCEDURE — 99214 OFFICE O/P EST MOD 30 MIN: CPT | Performed by: PHYSICIAN ASSISTANT

## 2023-04-17 PROCEDURE — 85027 COMPLETE CBC AUTOMATED: CPT | Performed by: PATHOLOGY

## 2023-04-17 PROCEDURE — 36415 COLL VENOUS BLD VENIPUNCTURE: CPT | Performed by: PATHOLOGY

## 2023-04-17 RX ORDER — AZATHIOPRINE 50 MG/1
50 TABLET ORAL DAILY
Qty: 90 TABLET | Refills: 3 | Status: SHIPPED | OUTPATIENT
Start: 2023-04-17 | End: 2024-04-15

## 2023-04-17 ASSESSMENT — PAIN SCALES - GENERAL: PAINLEVEL: NO PAIN (0)

## 2023-04-17 NOTE — PROGRESS NOTES
Hepatology Follow-up Clinic note  Satnam Gonzales   Date of Birth 1980  Date of Service 4/17/2023    Reason for follow-up: Cirrhosis          Assessment/plan:   Satnam Gonzales is a 43 year old male with history of autoimmune hepatitis maintained on 50 mg azathioprine.  He remains compliant with his medications.  Transaminases and liver function today are normal. Will continue to monitor CBC for any cytopenias. routinely for any Initial liver biopsy in 2018 showing Stage 2 fibrosis.     # Continue 50 mg azathioprine  - CBC and hepatic panel over 3 months    # Follow-up in clinic in one year     Nae Do PA-C   [facility] Hepatology clinic    -----------------------------------------------------       HPI:   Satnam Gonzales is a 43 year old male presenting for follow-up.     Autoimmune Hepatitis   Liver Biopsy on 11/1/2018: mildly active hepatitis with chronic features  Stage 2 fibrosis   F-actin - 56   Treatment:   - Prednisone taper started 11/2018, stayed on 2.5 mg since February 2019  - Azathioprine started 11/2018, increased to 75 mg in January 5, 2019  - Azathioprine reduced to 50 mg in 2021    Patient was last seen by 11/8/2021. No recent hospitalizations or ER visits. Had prolonged co ugh and URI this winter. Was treated with a few prednisone taper due to being unable to clear symptoms.     Taking 50 mg Azathioprine routinely. Appetite is okay. Weight is flutctuate over the winter.     Continues to take pantoprazole for EoE.     Patient denies jaundice, lower extremity edema, abdominal distension or confusion.  Patient also denies melena, hematochezia or hematemesis.  Patient denies weight loss, fevers, sweats or chills. 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 He does not drink alcohol.          Medications:     Current Outpatient Medications   Medication     azaTHIOprine (IMURAN) 50 MG tablet     cetirizine (ZYRTEC) 10 MG CHEW     pantoprazole (PROTONIX) 40 MG EC tablet      guaiFENesin-dextromethorphan (ROBITUSSIN DM) 100-10 MG/5ML syrup     loratadine-pseudoePHEDrine (CLARITIN-D 24-HOUR)  MG 24 hr tablet     loratadine-pseudoePHEDrine (CLARITIN-D 24-HOUR)  MG 24 hr tablet     methylPREDNISolone (MEDROL DOSEPAK) 4 MG tablet therapy pack     predniSONE (DELTASONE) 20 MG tablet     predniSONE (DELTASONE) 20 MG tablet     No current facility-administered medications for this visit.            Review of Systems:   10 points ROS was obtained and highlighted in the HPI, otherwise negative.          Physical Exam:   VS:  /73   Pulse 71   Temp 97.8  F (36.6  C) (Oral)   Wt 87.8 kg (193 lb 8 oz)   SpO2 98%   BMI 28.57 kg/m        Gen: A&Ox3, NAD, well developed  Lung: CTA Bilatererally, no wheezing or crackles.   Lym- no palpable lymphadenopathy  Abd: soft, NT, ND, no organomegaly.   Ext: no edema, intact pulses.   Skin: No rash, no palmar erythema, telangiectasias or jaundice  Neuro: grossly intact, no asterixis   Psych: appropriate mood and affects         Data:   Reviewed in person and significant for:    Lab Results   Component Value Date     08/04/2020      Lab Results   Component Value Date    POTASSIUM 4.2 08/04/2020     Lab Results   Component Value Date    CHLORIDE 105 08/04/2020     Lab Results   Component Value Date    CO2 28 08/04/2020     Lab Results   Component Value Date    BUN 13 08/04/2020     Lab Results   Component Value Date    CR 1.05 08/04/2020       Lab Results   Component Value Date    WBC 9.2 04/17/2023    WBC 4.4 07/09/2021     Lab Results   Component Value Date    HGB 16.7 04/17/2023    HGB 15.6 07/09/2021     Lab Results   Component Value Date    HCT 47.8 04/17/2023    HCT 43.9 07/09/2021     Lab Results   Component Value Date    MCV 88 04/17/2023    MCV 91 07/09/2021     Lab Results   Component Value Date     04/17/2023     07/09/2021       Lab Results   Component Value Date    AST 20 04/17/2023    AST 16 07/09/2021      Lab Results   Component Value Date    ALT 19 04/17/2023    ALT 25 07/09/2021     No results found for: BILICONJ   Lab Results   Component Value Date    BILITOTAL 0.7 04/17/2023    BILITOTAL 0.9 07/09/2021       Lab Results   Component Value Date    ALBUMIN 4.6 04/17/2023    ALBUMIN 4.2 04/01/2022    ALBUMIN 4.4 07/09/2021     Lab Results   Component Value Date    PROTTOTAL 7.2 04/17/2023    PROTTOTAL 7.0 07/09/2021      Lab Results   Component Value Date    ALKPHOS 81 04/17/2023    ALKPHOS 73 07/09/2021       Lab Results   Component Value Date    INR 1.05 08/04/2020

## 2023-04-17 NOTE — NURSING NOTE
Chief Complaint   Patient presents with     RECHECK     Yearly f/u       /73   Pulse 71   Temp 97.8  F (36.6  C) (Oral)   Wt 87.8 kg (193 lb 8 oz)   SpO2 98%   BMI 28.57 kg/m      Osman Jacobsen on 4/17/2023 at 7:56 AM

## 2023-04-17 NOTE — LETTER
4/17/2023         RE: Satnam Gonzales  [address]  [facility] [address]        Dear Colleague,    Thank you for referring your patient, Satnam Gonzales, to the [facility] [place]. Please see a copy of my visit note below.    Hepatology Follow-up Clinic note  Satnam Gonzales   Date of Birth 1980  Date of Service 4/17/2023    Reason for follow-up: Cirrhosis          Assessment/plan:   Satnam Gonzales is a 43 year old male with history of autoimmune hepatitis maintained on 50 mg azathioprine.  He remains compliant with his medications.  Transaminases and liver function today are normal. Will continue to monitor CBC for any cytopenias. routinely for any Initial liver biopsy in 2018 showing Stage 2 fibrosis.     # Continue 50 mg azathioprine  - CBC and hepatic panel over 3 months    # Follow-up in clinic in one year     Nae Do PA-C   [facility] Hepatology clinic    -----------------------------------------------------       HPI:   Satnam Gonzales is a 43 year old male presenting for follow-up.     Autoimmune Hepatitis   Liver Biopsy on 11/1/2018: mildly active hepatitis with chronic features  Stage 2 fibrosis   F-actin - 56   Treatment:   - Prednisone taper started 11/2018, stayed on 2.5 mg since February 2019  - Azathioprine started 11/2018, increased to 75 mg in January 5, 2019  - Azathioprine reduced to 50 mg in 2021    Patient was last seen by 11/8/2021. No recent hospitalizations or ER visits. Had prolonged co ugh and URI this winter. Was treated with a few prednisone taper due to being unable to clear symptoms.     Taking 50 mg Azathioprine routinely. Appetite is okay. Weight is flutctuate over the winter.     Continues to take pantoprazole for EoE.     Patient denies jaundice, lower extremity edema, abdominal distension or confusion.  Patient also denies melena, hematochezia or hematemesis.  Patient denies weight loss, fevers, sweats or chills. 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          Medications:     Current Outpatient Medications   Medication    azaTHIOprine (IMURAN) 50 MG tablet    cetirizine (ZYRTEC) 10 MG CHEW    pantoprazole (PROTONIX) 40 MG EC tablet    guaiFENesin-dextromethorphan (ROBITUSSIN DM) 100-10 MG/5ML syrup    loratadine-pseudoePHEDrine (CLARITIN-D 24-HOUR)  MG 24 hr tablet    loratadine-pseudoePHEDrine (CLARITIN-D 24-HOUR)  MG 24 hr tablet    methylPREDNISolone (MEDROL DOSEPAK) 4 MG tablet therapy pack    predniSONE (DELTASONE) 20 MG tablet    predniSONE (DELTASONE) 20 MG tablet     No current facility-administered medications for this visit.            Review of Systems:   10 points ROS was obtained and highlighted in the HPI, otherwise negative.          Physical Exam:   VS:  /73   Pulse 71   Temp 97.8  F (36.6  C) (Oral)   Wt 87.8 kg (193 lb 8 oz)   SpO2 98%   BMI 28.57 kg/m        Gen: A&Ox3, NAD, well developed  Lung: CTA Bilatererally, no wheezing or crackles.   Lym- no palpable lymphadenopathy  Abd: soft, NT, ND, no organomegaly.   Ext: no edema, intact pulses.   Skin: No rash, no palmar erythema, telangiectasias or jaundice  Neuro: grossly intact, no asterixis   Psych: appropriate mood and affects         Data:   Reviewed in person and significant for:    Lab Results   Component Value Date     08/04/2020      Lab Results   Component Value Date    POTASSIUM 4.2 08/04/2020     Lab Results   Component Value Date    CHLORIDE 105 08/04/2020     Lab Results   Component Value Date    CO2 28 08/04/2020     Lab Results   Component Value Date    BUN 13 08/04/2020     Lab Results   Component Value Date    CR 1.05 08/04/2020       Lab Results   Component Value Date    WBC 9.2 04/17/2023    WBC 4.4 07/09/2021     Lab Results   Component Value Date    HGB 16.7 04/17/2023    HGB 15.6 07/09/2021     Lab Results   Component Value Date    HCT 47.8 04/17/2023    HCT 43.9  07/09/2021     Lab Results   Component Value Date    MCV 88 04/17/2023    MCV 91 07/09/2021     Lab Results   Component Value Date     04/17/2023     07/09/2021       Lab Results   Component Value Date    AST 20 04/17/2023    AST 16 07/09/2021     Lab Results   Component Value Date    ALT 19 04/17/2023    ALT 25 07/09/2021     No results found for: BILICONJ   Lab Results   Component Value Date    BILITOTAL 0.7 04/17/2023    BILITOTAL 0.9 07/09/2021       Lab Results   Component Value Date    ALBUMIN 4.6 04/17/2023    ALBUMIN 4.2 04/01/2022    ALBUMIN 4.4 07/09/2021     Lab Results   Component Value Date    PROTTOTAL 7.2 04/17/2023    PROTTOTAL 7.0 07/09/2021      Lab Results   Component Value Date    ALKPHOS 81 04/17/2023    ALKPHOS 73 07/09/2021       Lab Results   Component Value Date    INR 1.05 08/04/2020           Again, thank you for allowing me to participate in the care of your patient.        Sincerely,        Nae Do PA-C

## 2023-04-28 DIAGNOSIS — K20.0 EOSINOPHILIC ESOPHAGITIS: ICD-10-CM

## 2023-04-28 RX ORDER — PANTOPRAZOLE SODIUM 40 MG/1
40 TABLET, DELAYED RELEASE ORAL DAILY
Qty: 90 TABLET | Refills: 1 | Status: SHIPPED | OUTPATIENT
Start: 2023-04-28 | End: 2023-10-26

## 2023-06-03 ENCOUNTER — HEALTH MAINTENANCE LETTER (OUTPATIENT)
Age: 43
End: 2023-06-03

## 2023-07-20 ENCOUNTER — LAB (OUTPATIENT)
Dept: LAB | Facility: CLINIC | Age: 43
End: 2023-07-20
Payer: COMMERCIAL

## 2023-07-20 DIAGNOSIS — K75.4 AUTOIMMUNE HEPATITIS (H): ICD-10-CM

## 2023-07-20 LAB
ERYTHROCYTE [DISTWIDTH] IN BLOOD BY AUTOMATED COUNT: 12.1 % (ref 10–15)
HCT VFR BLD AUTO: 44 % (ref 40–53)
HGB BLD-MCNC: 14.5 G/DL (ref 13.3–17.7)
MCH RBC QN AUTO: 30.3 PG (ref 26.5–33)
MCHC RBC AUTO-ENTMCNC: 33 G/DL (ref 31.5–36.5)
MCV RBC AUTO: 92 FL (ref 78–100)
PLATELET # BLD AUTO: 185 10E3/UL (ref 150–450)
RBC # BLD AUTO: 4.78 10E6/UL (ref 4.4–5.9)
WBC # BLD AUTO: 4.3 10E3/UL (ref 4–11)

## 2023-07-20 PROCEDURE — 85027 COMPLETE CBC AUTOMATED: CPT

## 2023-07-20 PROCEDURE — 36415 COLL VENOUS BLD VENIPUNCTURE: CPT

## 2023-07-20 PROCEDURE — 80076 HEPATIC FUNCTION PANEL: CPT

## 2023-07-21 LAB
ALBUMIN SERPL BCG-MCNC: 4.6 G/DL (ref 3.5–5.2)
ALP SERPL-CCNC: 70 U/L (ref 40–129)
ALT SERPL W P-5'-P-CCNC: 24 U/L (ref 0–70)
AST SERPL W P-5'-P-CCNC: 27 U/L (ref 0–45)
BILIRUB DIRECT SERPL-MCNC: <0.2 MG/DL (ref 0–0.3)
BILIRUB SERPL-MCNC: 0.7 MG/DL
PROT SERPL-MCNC: 6.9 G/DL (ref 6.4–8.3)

## 2023-10-26 DIAGNOSIS — K20.0 EOSINOPHILIC ESOPHAGITIS: ICD-10-CM

## 2023-10-26 RX ORDER — PANTOPRAZOLE SODIUM 40 MG/1
40 TABLET, DELAYED RELEASE ORAL DAILY
Qty: 90 TABLET | Refills: 0 | Status: SHIPPED | OUTPATIENT
Start: 2023-10-26 | End: 2024-01-22

## 2023-10-26 NOTE — TELEPHONE ENCOUNTER
Prescription approved per [facility] Refill Protocol.  Elda AARON RN  [facility]

## 2023-11-03 ENCOUNTER — LAB (OUTPATIENT)
Dept: LAB | Facility: CLINIC | Age: 43
End: 2023-11-03
Payer: COMMERCIAL

## 2023-11-03 DIAGNOSIS — K75.4 AUTOIMMUNE HEPATITIS (H): ICD-10-CM

## 2023-11-03 LAB
ALBUMIN SERPL BCG-MCNC: 4.5 G/DL (ref 3.5–5.2)
ALP SERPL-CCNC: 82 U/L (ref 40–129)
ALT SERPL W P-5'-P-CCNC: 25 U/L (ref 0–70)
AST SERPL W P-5'-P-CCNC: 25 U/L (ref 0–45)
BILIRUB DIRECT SERPL-MCNC: <0.2 MG/DL (ref 0–0.3)
BILIRUB SERPL-MCNC: 0.7 MG/DL
ERYTHROCYTE [DISTWIDTH] IN BLOOD BY AUTOMATED COUNT: 11.7 % (ref 10–15)
HCT VFR BLD AUTO: 44.7 % (ref 40–53)
HGB BLD-MCNC: 15 G/DL (ref 13.3–17.7)
MCH RBC QN AUTO: 30.5 PG (ref 26.5–33)
MCHC RBC AUTO-ENTMCNC: 33.6 G/DL (ref 31.5–36.5)
MCV RBC AUTO: 91 FL (ref 78–100)
PLATELET # BLD AUTO: 185 10E3/UL (ref 150–450)
PROT SERPL-MCNC: 6.7 G/DL (ref 6.4–8.3)
RBC # BLD AUTO: 4.92 10E6/UL (ref 4.4–5.9)
WBC # BLD AUTO: 4.3 10E3/UL (ref 4–11)

## 2023-11-03 PROCEDURE — 80076 HEPATIC FUNCTION PANEL: CPT

## 2023-11-03 PROCEDURE — 85027 COMPLETE CBC AUTOMATED: CPT

## 2023-11-03 PROCEDURE — 36415 COLL VENOUS BLD VENIPUNCTURE: CPT

## 2024-01-22 DIAGNOSIS — K20.0 EOSINOPHILIC ESOPHAGITIS: ICD-10-CM

## 2024-01-22 RX ORDER — PANTOPRAZOLE SODIUM 40 MG/1
40 TABLET, DELAYED RELEASE ORAL DAILY
Qty: 90 TABLET | Refills: 0 | Status: SHIPPED | OUTPATIENT
Start: 2024-01-22 | End: 2024-04-24

## 2024-01-22 NOTE — TELEPHONE ENCOUNTER
Prescription approved per [facility] Refill Protocol.  Elisa Roberto RN  [facility]

## 2024-02-15 ENCOUNTER — LAB (OUTPATIENT)
Dept: LAB | Facility: CLINIC | Age: 44
End: 2024-02-15
Payer: COMMERCIAL

## 2024-02-15 DIAGNOSIS — K75.4 AUTOIMMUNE HEPATITIS (H): ICD-10-CM

## 2024-02-15 LAB
ALBUMIN SERPL BCG-MCNC: 4.9 G/DL (ref 3.5–5.2)
ALP SERPL-CCNC: 78 U/L (ref 40–150)
ALT SERPL W P-5'-P-CCNC: 16 U/L (ref 0–70)
AST SERPL W P-5'-P-CCNC: 23 U/L (ref 0–45)
BILIRUB DIRECT SERPL-MCNC: <0.2 MG/DL (ref 0–0.3)
BILIRUB SERPL-MCNC: 0.7 MG/DL
ERYTHROCYTE [DISTWIDTH] IN BLOOD BY AUTOMATED COUNT: 11.6 % (ref 10–15)
HCT VFR BLD AUTO: 45.3 % (ref 40–53)
HGB BLD-MCNC: 15.4 G/DL (ref 13.3–17.7)
MCH RBC QN AUTO: 30.8 PG (ref 26.5–33)
MCHC RBC AUTO-ENTMCNC: 34 G/DL (ref 31.5–36.5)
MCV RBC AUTO: 91 FL (ref 78–100)
PLATELET # BLD AUTO: 200 10E3/UL (ref 150–450)
PROT SERPL-MCNC: 7.1 G/DL (ref 6.4–8.3)
RBC # BLD AUTO: 5 10E6/UL (ref 4.4–5.9)
WBC # BLD AUTO: 4.7 10E3/UL (ref 4–11)

## 2024-02-15 PROCEDURE — 36415 COLL VENOUS BLD VENIPUNCTURE: CPT

## 2024-02-15 PROCEDURE — 85027 COMPLETE CBC AUTOMATED: CPT

## 2024-02-15 PROCEDURE — 80076 HEPATIC FUNCTION PANEL: CPT

## 2024-04-08 DIAGNOSIS — K75.4 AUTOIMMUNE HEPATITIS (H): Primary | ICD-10-CM

## 2024-04-15 ENCOUNTER — LAB (OUTPATIENT)
Dept: LAB | Facility: CLINIC | Age: 44
End: 2024-04-15
Attending: PHYSICIAN ASSISTANT
Payer: COMMERCIAL

## 2024-04-15 ENCOUNTER — OFFICE VISIT (OUTPATIENT)
Dept: GASTROENTEROLOGY | Facility: CLINIC | Age: 44
End: 2024-04-15
Attending: PHYSICIAN ASSISTANT
Payer: COMMERCIAL

## 2024-04-15 VITALS
RESPIRATION RATE: 16 BRPM | DIASTOLIC BLOOD PRESSURE: 76 MMHG | WEIGHT: 177 LBS | TEMPERATURE: 97.7 F | OXYGEN SATURATION: 98 % | HEIGHT: 69 IN | BODY MASS INDEX: 26.22 KG/M2 | HEART RATE: 53 BPM | SYSTOLIC BLOOD PRESSURE: 120 MMHG

## 2024-04-15 DIAGNOSIS — Z12.11 COLON CANCER SCREENING: Primary | ICD-10-CM

## 2024-04-15 DIAGNOSIS — K75.4 AUTOIMMUNE HEPATITIS (H): ICD-10-CM

## 2024-04-15 LAB
ALBUMIN SERPL BCG-MCNC: 4.7 G/DL (ref 3.5–5.2)
ALP SERPL-CCNC: 84 U/L (ref 40–150)
ALT SERPL W P-5'-P-CCNC: 26 U/L (ref 0–70)
ANION GAP SERPL CALCULATED.3IONS-SCNC: 8 MMOL/L (ref 7–15)
AST SERPL W P-5'-P-CCNC: 21 U/L (ref 0–45)
BILIRUB DIRECT SERPL-MCNC: 0.23 MG/DL (ref 0–0.3)
BILIRUB SERPL-MCNC: 0.9 MG/DL
BUN SERPL-MCNC: 15.5 MG/DL (ref 6–20)
CALCIUM SERPL-MCNC: 9.8 MG/DL (ref 8.6–10)
CHLORIDE SERPL-SCNC: 103 MMOL/L (ref 98–107)
CREAT SERPL-MCNC: 1.09 MG/DL (ref 0.67–1.17)
DEPRECATED HCO3 PLAS-SCNC: 30 MMOL/L (ref 22–29)
EGFRCR SERPLBLD CKD-EPI 2021: 85 ML/MIN/1.73M2
ERYTHROCYTE [DISTWIDTH] IN BLOOD BY AUTOMATED COUNT: 12.2 % (ref 10–15)
GLUCOSE SERPL-MCNC: 106 MG/DL (ref 70–99)
HCT VFR BLD AUTO: 44.1 % (ref 40–53)
HGB BLD-MCNC: 15.3 G/DL (ref 13.3–17.7)
INR PPP: 1.09 (ref 0.85–1.15)
MCH RBC QN AUTO: 31.1 PG (ref 26.5–33)
MCHC RBC AUTO-ENTMCNC: 34.7 G/DL (ref 31.5–36.5)
MCV RBC AUTO: 90 FL (ref 78–100)
PLATELET # BLD AUTO: 176 10E3/UL (ref 150–450)
POTASSIUM SERPL-SCNC: 4.7 MMOL/L (ref 3.4–5.3)
PROT SERPL-MCNC: 7.3 G/DL (ref 6.4–8.3)
RBC # BLD AUTO: 4.92 10E6/UL (ref 4.4–5.9)
SODIUM SERPL-SCNC: 141 MMOL/L (ref 135–145)
WBC # BLD AUTO: 3.8 10E3/UL (ref 4–11)

## 2024-04-15 PROCEDURE — 85027 COMPLETE CBC AUTOMATED: CPT | Performed by: PATHOLOGY

## 2024-04-15 PROCEDURE — 36415 COLL VENOUS BLD VENIPUNCTURE: CPT | Performed by: PATHOLOGY

## 2024-04-15 PROCEDURE — 80053 COMPREHEN METABOLIC PANEL: CPT | Performed by: PATHOLOGY

## 2024-04-15 PROCEDURE — 99214 OFFICE O/P EST MOD 30 MIN: CPT | Performed by: PHYSICIAN ASSISTANT

## 2024-04-15 PROCEDURE — 85610 PROTHROMBIN TIME: CPT | Performed by: PATHOLOGY

## 2024-04-15 PROCEDURE — 82248 BILIRUBIN DIRECT: CPT | Performed by: PATHOLOGY

## 2024-04-15 RX ORDER — AZATHIOPRINE 50 MG/1
50 TABLET ORAL DAILY
Qty: 90 TABLET | Refills: 3 | Status: SHIPPED | OUTPATIENT
Start: 2024-04-15

## 2024-04-15 RX ORDER — FEXOFENADINE HCL 180 MG/1
180 TABLET ORAL DAILY PRN
COMMUNITY
Start: 2024-03-01

## 2024-04-15 ASSESSMENT — PAIN SCALES - GENERAL: PAINLEVEL: NO PAIN (0)

## 2024-04-15 NOTE — PROGRESS NOTES
Hepatology Follow-up Clinic note  Satnam Gonzales   Date of Birth 1980      Reason for follow-up:          Assessment/plan:   Satnam Gonzales is a 44 year old male with AIH maintained on Azathioprine with good disease control and compliance. Initial liver biopsy showing Stage 2 Fibrosis. Transaminases remain normal. Liver function is also normal with no stigmata of advanced liver disease.     # Autoimmune hepatitis, well-controlled:  - Continue azathioprine 50 mg daily  - Continue monitoring labs every 3 months to assess for appropriate dosing, side effects for toxicity and efficacy (with CBC, hepatic panel and IgG).   - WBC trending down a bit today. Currently getting over an upper respiratory infections, which have happened consistently over the past few years and have seems harder to recover.     # Colon cancer screening:   - Colonoscopy at next convenience     Follow-up in clinic in one year     Nae Do PA-C   [facility] Hepatology clinic    Total time for E/M services performed on the date of the encounter 30 minutes.  This included review of previous: clinic visits, hospital records, lab results, imaging studies, and procedural documentation. Time also includes patient visit, documentation and discussion with other providers.  The findings from this review are summarized in the above note.     -----------------------------------------------------       HPI:   Satnam Gonzales is a 44 year old male presenting for follow-up.     Autoimmune Hepatitis   Liver Biopsy on 11/1/2018: mildly active hepatitis with chronic features and Stage 2 fibrosis   F-actin - 56   Treatment:   - Prednisone taper started 11/2018, stayed on 2.5 mg since February 2019  - Azathioprine started 11/2018, increased to 75 mg in January 5, 2019  - Azathioprine reduced to 50 mg in 2021    Patient was last seen by me on 4/17/2023. No recent hospitalizations or ER visits. No new medications.     Appetite is good. Weight is down  "about 15 lbs. More consistent with working out.     Continues to have symptom relief with pantoprazole 40 mg daily.     Patient denies jaundice, lower extremity edema, abdominal distension or confusion.    Patient also denies melena, hematochezia or hematemesis.  Patient denies weight loss, fevers, sweats or chills.    He does not drink alcohol.     Recent Labs   Lab Test 02/15/24  1514 11/03/23  1504 07/20/23  1329 04/17/23  0733 04/01/22  1132 11/02/21  1609 07/09/21  1341 11/03/20  1151 08/04/20  1450 11/22/19  1019   ALKPHOS 78 82 70 81 78 81 73 80 77 93   ALT 16 25 24 19 28 18 25 28 65 47   AST 23 25 27 20 16 12 16 19 96* 26   BILITOTAL 0.7 0.7 0.7 0.7 0.8 0.6 0.9 0.9 0.8 0.9        Medical hx Surgical hx   Past Medical History:   Diagnosis Date    Autoimmune hepatitis (H)     Depression     Eosinophilic esophagitis     Past Surgical History:   Procedure Laterality Date    NO HISTORY OF SURGERY                   Medications:     Current Outpatient Medications   Medication Sig Dispense Refill    azaTHIOprine (IMURAN) 50 MG tablet Take 1 tablet (50 mg) by mouth daily Need appt for further refills 90 tablet 3    cetirizine (ZYRTEC) 10 MG CHEW Take 1 tablet (10 mg) by mouth daily 90 tablet 3    loratadine-pseudoePHEDrine (CLARITIN-D 24-HOUR)  MG 24 hr tablet Take 1 tablet by mouth daily (Patient not taking: Reported on 4/17/2023) 30 tablet 2    pantoprazole (PROTONIX) 40 MG EC tablet TAKE 1 TABLET (40 MG) BY MOUTH DAILY TAKE 30-60 MINUTES BEFORE A MEAL. 90 tablet 0     No current facility-administered medications for this visit.            Allergies:     Allergies   Allergen Reactions    Seasonal Allergies             Review of Systems:   10 points ROS was obtained and highlighted in the HPI, otherwise negative.          Physical Exam:   /76 (BP Location: Right arm, Patient Position: Sitting, Cuff Size: Adult Regular)   Pulse 53   Temp 97.7  F (36.5  C) (Oral)   Resp 16   Ht 1.753 m (5' 9.02\")   Wt " "80.3 kg (177 lb)   SpO2 98%   BMI 26.13 kg/m      Gen- well, NAD, A+Ox3, normal color  Lym- no palpable LAD  Abd- soft, nontender.   Extr- hands normal, no DANNIELLE  Skin- no rash or jaundice  Neuro- no asterixis  Psych- normal mood         Data:   Reviewed in person and significant for:    Lab Results   Component Value Date     08/04/2020      Lab Results   Component Value Date    POTASSIUM 4.2 08/04/2020     Lab Results   Component Value Date    CHLORIDE 105 08/04/2020     Lab Results   Component Value Date    CO2 28 08/04/2020     Lab Results   Component Value Date    BUN 13 08/04/2020     Lab Results   Component Value Date    CR 1.05 08/04/2020       Lab Results   Component Value Date    WBC 4.7 02/15/2024    WBC 4.4 07/09/2021     Lab Results   Component Value Date    HGB 15.4 02/15/2024    HGB 15.6 07/09/2021     Lab Results   Component Value Date    HCT 45.3 02/15/2024    HCT 43.9 07/09/2021     Lab Results   Component Value Date    MCV 91 02/15/2024    MCV 91 07/09/2021     Lab Results   Component Value Date     02/15/2024     07/09/2021       Lab Results   Component Value Date    AST 23 02/15/2024    AST 16 07/09/2021     Lab Results   Component Value Date    ALT 16 02/15/2024    ALT 25 07/09/2021     No results found for: \"BILICONJ\"   Lab Results   Component Value Date    BILITOTAL 0.7 02/15/2024    BILITOTAL 0.9 07/09/2021       Lab Results   Component Value Date    ALBUMIN 4.9 02/15/2024    ALBUMIN 4.2 04/01/2022    ALBUMIN 4.4 07/09/2021     Lab Results   Component Value Date    PROTTOTAL 7.1 02/15/2024    PROTTOTAL 7.0 07/09/2021      Lab Results   Component Value Date    ALKPHOS 78 02/15/2024    ALKPHOS 73 07/09/2021       Lab Results   Component Value Date    INR 1.05 08/04/2020       RIGHT UPPER QUADRANT ULTRASOUND 10/24/2018 7:12 AM     HISTORY:  Elevated liver enzymes.     COMPARISON: None.     FINDINGS:    Gallbladder:  Normal with no cholelithiasis, wall thickening or " focal  tenderness.       Bile ducts:   CHD is normal diameter.  No intrahepatic biliary  dilatation.     Liver:   Normal.      Pancreas:   Normal.      Right kidney:   Normal.                                                                       IMPRESSION:   Normal right upper quadrant ultrasound.      ROSENDA FELDMAN MD

## 2024-04-15 NOTE — LETTER
4/15/2024         RE: Satnam Gonzales  [address] [facility] [address]        Dear Colleague,    Thank you for referring your patient, Satnam Gonzales, to the [facility] [place]. Please see a copy of my visit note below.    Hepatology Follow-up Clinic note  Satnam Gonzales   Date of Birth 1980      Reason for follow-up:          Assessment/plan:   Satnma Gonzales is a 44 year old male with AIH maintained on Azathioprine with good disease control and compliance. Initial liver biopsy showing Stage 2 Fibrosis. Transaminases remain normal. Liver function is also normal with no stigmata of advanced liver disease.     # Autoimmune hepatitis, well-controlled:  - Continue azathioprine 50 mg daily  - Continue monitoring labs every 3 months to assess for appropriate dosing, side effects for toxicity and efficacy (with CBC, hepatic panel and IgG).   - WBC trending down a bit today. Currently getting over an upper respiratory infections, which have happened consistently over the past few years and have seems harder to recover.     # Colon cancer screening:   - Colonoscopy at next convenience     Follow-up in clinic in one year     Nae Do PA-C   [facility] Hepatology clinic    Total time for E/M services performed on the date of the encounter 30 minutes.  This included review of previous: clinic visits, hospital records, lab results, imaging studies, and procedural documentation. Time also includes patient visit, documentation and discussion with other providers.  The findings from this review are summarized in the above note.     -----------------------------------------------------       HPI:   Satnam Gonzales is a 44 year old male presenting for follow-up.     Autoimmune Hepatitis   Liver Biopsy on 11/1/2018: mildly active hepatitis with chronic features and Stage 2 fibrosis   F-actin - 56   Treatment:   - Prednisone taper started 11/2018, stayed on 2.5 mg since February  2019  - Azathioprine started 11/2018, increased to 75 mg in January 5, 2019  - Azathioprine reduced to 50 mg in 2021    Patient was last seen by me on 4/17/2023. No recent hospitalizations or ER visits. No new medications.     Appetite is good. Weight is down about 15 lbs. More consistent with working out.     Continues to have symptom relief with pantoprazole 40 mg daily.     Patient denies jaundice, lower extremity edema, abdominal distension or confusion.    Patient also denies melena, hematochezia or hematemesis.  Patient denies weight loss, fevers, sweats or chills.    He does not drink alcohol.     Recent Labs   Lab Test 02/15/24  1514 11/03/23  1504 07/20/23  1329 04/17/23  0733 04/01/22  1132 11/02/21  1609 07/09/21  1341 11/03/20  1151 08/04/20  1450 11/22/19  1019   ALKPHOS 78 82 70 81 78 81 73 80 77 93   ALT 16 25 24 19 28 18 25 28 65 47   AST 23 25 27 20 16 12 16 19 96* 26   BILITOTAL 0.7 0.7 0.7 0.7 0.8 0.6 0.9 0.9 0.8 0.9        Medical hx Surgical hx   Past Medical History:   Diagnosis Date     Autoimmune hepatitis (H)      Depression      Eosinophilic esophagitis     Past Surgical History:   Procedure Laterality Date     NO HISTORY OF SURGERY                   Medications:     Current Outpatient Medications   Medication Sig Dispense Refill     azaTHIOprine (IMURAN) 50 MG tablet Take 1 tablet (50 mg) by mouth daily Need appt for further refills 90 tablet 3     cetirizine (ZYRTEC) 10 MG CHEW Take 1 tablet (10 mg) by mouth daily 90 tablet 3     loratadine-pseudoePHEDrine (CLARITIN-D 24-HOUR)  MG 24 hr tablet Take 1 tablet by mouth daily (Patient not taking: Reported on 4/17/2023) 30 tablet 2     pantoprazole (PROTONIX) 40 MG EC tablet TAKE 1 TABLET (40 MG) BY MOUTH DAILY TAKE 30-60 MINUTES BEFORE A MEAL. 90 tablet 0     No current facility-administered medications for this visit.            Allergies:     Allergies   Allergen Reactions     Seasonal Allergies             Review of Systems:   10  "points ROS was obtained and highlighted in the HPI, otherwise negative.          Physical Exam:   /76 (BP Location: Right arm, Patient Position: Sitting, Cuff Size: Adult Regular)   Pulse 53   Temp 97.7  F (36.5  C) (Oral)   Resp 16   Ht 1.753 m (5' 9.02\")   Wt 80.3 kg (177 lb)   SpO2 98%   BMI 26.13 kg/m      Gen- well, NAD, A+Ox3, normal color  Lym- no palpable LAD  Abd- soft, nontender.   Extr- hands normal, no DANNIELLE  Skin- no rash or jaundice  Neuro- no asterixis  Psych- normal mood         Data:   Reviewed in person and significant for:    Lab Results   Component Value Date     08/04/2020      Lab Results   Component Value Date    POTASSIUM 4.2 08/04/2020     Lab Results   Component Value Date    CHLORIDE 105 08/04/2020     Lab Results   Component Value Date    CO2 28 08/04/2020     Lab Results   Component Value Date    BUN 13 08/04/2020     Lab Results   Component Value Date    CR 1.05 08/04/2020       Lab Results   Component Value Date    WBC 4.7 02/15/2024    WBC 4.4 07/09/2021     Lab Results   Component Value Date    HGB 15.4 02/15/2024    HGB 15.6 07/09/2021     Lab Results   Component Value Date    HCT 45.3 02/15/2024    HCT 43.9 07/09/2021     Lab Results   Component Value Date    MCV 91 02/15/2024    MCV 91 07/09/2021     Lab Results   Component Value Date     02/15/2024     07/09/2021       Lab Results   Component Value Date    AST 23 02/15/2024    AST 16 07/09/2021     Lab Results   Component Value Date    ALT 16 02/15/2024    ALT 25 07/09/2021     No results found for: \"BILICONJ\"   Lab Results   Component Value Date    BILITOTAL 0.7 02/15/2024    BILITOTAL 0.9 07/09/2021       Lab Results   Component Value Date    ALBUMIN 4.9 02/15/2024    ALBUMIN 4.2 04/01/2022    ALBUMIN 4.4 07/09/2021     Lab Results   Component Value Date    PROTTOTAL 7.1 02/15/2024    PROTTOTAL 7.0 07/09/2021      Lab Results   Component Value Date    ALKPHOS 78 02/15/2024    ALKPHOS 73 07/09/2021 "       Lab Results   Component Value Date    INR 1.05 08/04/2020       RIGHT UPPER QUADRANT ULTRASOUND 10/24/2018 7:12 AM     HISTORY:  Elevated liver enzymes.     COMPARISON: None.     FINDINGS:    Gallbladder:  Normal with no cholelithiasis, wall thickening or focal  tenderness.       Bile ducts:   CHD is normal diameter.  No intrahepatic biliary  dilatation.     Liver:   Normal.      Pancreas:   Normal.      Right kidney:   Normal.                                                                       IMPRESSION:   Normal right upper quadrant ultrasound.      ROSENDA FELDMAN MD      Again, thank you for allowing me to participate in the care of your patient.        Sincerely,        Nae Do PA-C

## 2024-04-23 DIAGNOSIS — K20.0 EOSINOPHILIC ESOPHAGITIS: ICD-10-CM

## 2024-04-24 ENCOUNTER — MYC REFILL (OUTPATIENT)
Dept: FAMILY MEDICINE | Facility: CLINIC | Age: 44
End: 2024-04-24
Payer: COMMERCIAL

## 2024-04-24 DIAGNOSIS — K20.0 EOSINOPHILIC ESOPHAGITIS: ICD-10-CM

## 2024-04-24 RX ORDER — PANTOPRAZOLE SODIUM 40 MG/1
40 TABLET, DELAYED RELEASE ORAL DAILY
Qty: 90 TABLET | Refills: 0 | OUTPATIENT
Start: 2024-04-24

## 2024-04-24 RX ORDER — PANTOPRAZOLE SODIUM 40 MG/1
40 TABLET, DELAYED RELEASE ORAL DAILY
Qty: 30 TABLET | Refills: 0 | Status: SHIPPED | OUTPATIENT
Start: 2024-04-24 | End: 2024-05-13

## 2024-04-24 NOTE — TELEPHONE ENCOUNTER
Pt sent request in separate MY CHART message 4-24-24 for omeprazole and was given qnty #30.  Tacked a note onto that MY CHART encounter DUE APPT, please schedule.     Kacy BRUNO MA

## 2024-05-01 NOTE — TELEPHONE ENCOUNTER
Patient Contact    Attempt # 1    Was call answered?  No.  Left message on voicemail with information to call clinic back.    Sent pt a MyC message advising to call back to clinic for sooner appt.    Paola Motley RN  [facility]

## 2024-05-13 ENCOUNTER — VIRTUAL VISIT (OUTPATIENT)
Dept: FAMILY MEDICINE | Facility: CLINIC | Age: 44
End: 2024-05-13
Payer: COMMERCIAL

## 2024-05-13 DIAGNOSIS — K20.0 EOSINOPHILIC ESOPHAGITIS: ICD-10-CM

## 2024-05-13 PROCEDURE — 99213 OFFICE O/P EST LOW 20 MIN: CPT | Mod: 95 | Performed by: INTERNAL MEDICINE

## 2024-05-13 RX ORDER — PANTOPRAZOLE SODIUM 40 MG/1
40 TABLET, DELAYED RELEASE ORAL DAILY
Qty: 90 TABLET | Refills: 3 | Status: SHIPPED | OUTPATIENT
Start: 2024-05-13

## 2024-05-13 NOTE — PROGRESS NOTES
Adams is a 44 year old who is being evaluated via a billable video visit.    How would you like to obtain your AVS? MyChart  If the video visit is dropped, the invitation should be resent by: Text to cell phone: [phone]  Will anyone else be joining your video visit? No      Subjective   Adams is a 44 year old, presenting for the following health issues:  Recheck Medication    History of Present Illness       Reason for visit:  So i can receive more refills.    He eats 2-3 servings of fruits and vegetables daily.He consumes 1 sweetened beverage(s) daily.He exercises with enough effort to increase his heart rate 20 to 29 minutes per day.  He exercises with enough effort to increase his heart rate 4 days per week.   He is taking medications regularly.           Current Medications:     Current Outpatient Medications   Medication Sig Dispense Refill    azaTHIOprine (IMURAN) 50 MG tablet Take 1 tablet (50 mg) by mouth daily Need appt for further refills 90 tablet 3    fexofenadine (ALLEGRA) 180 MG tablet Take 180 mg by mouth daily as needed for allergies      pantoprazole (PROTONIX) 40 MG EC tablet Take 1 tablet (40 mg) by mouth daily Take 30-60 minutes before a meal. 90 tablet 3         Allergies:      Allergies   Allergen Reactions    Seasonal Allergies             Past Medical History:     Past Medical History:   Diagnosis Date    Autoimmune hepatitis (H)     Depression     Eosinophilic esophagitis          Past Surgical History:     Past Surgical History:   Procedure Laterality Date    NO HISTORY OF SURGERY           Family Medical History:     Family History   Problem Relation Age of Onset    C.A.D. Paternal Grandfather 50    Alzheimer Disease Maternal Grandfather 73    Food Allergy Father     Seasonal/Environmental Allergies Brother          Social History:     Social History     Socioeconomic History    Marital status:      Spouse name: Not on file    Number of children: Not on file    Years of  education: Not on file    Highest education level: Not on file   Occupational History    Not on file   Tobacco Use    Smoking status: Former     Current packs/day: 1.00     Average packs/day: 1 pack/day for 10.0 years (10.0 ttl pk-yrs)     Types: Cigarettes    Smokeless tobacco: Former     Types: Chew   Vaping Use    Vaping status: Never Used   Substance and Sexual Activity    Alcohol use: No    Drug use: No    Sexual activity: Yes     Partners: Female   Other Topics Concern    Parent/sibling w/ CABG, MI or angioplasty before 65F 55M? Not Asked   Social History Narrative    Not on file     Social Determinants of Health     Financial Resource Strain: Not on file   Food Insecurity: Not on file   Transportation Needs: Not on file   Physical Activity: Not on file   Stress: Not on file   Social Connections: Not on file   Interpersonal Safety: Not on file   Housing Stability: Not on file           Review of System:     Constitutional: Negative for fever or chills  Skin: Negative for rashes  Ears/Nose/Throat: Negative for nasal congestion, sore throat  Respiratory: No shortness of breath, dyspnea on exertion, cough, or hemoptysis  Cardiovascular: Negative for chest pain  Gastrointestinal: positive for eosinophilic esophagitis  Genitourinary: Negative for dysuria, hematuria  Musculoskeletal: Negative for myalgias  Neurologic: Negative for headaches  Psychiatric: Negative for depression, anxiety  Hematologic/Lymphatic/Immunologic: Negative  Endocrine: Negative  Behavioral: Negative for tobacco use       Physical Exam:   There were no vitals taken for this visit.    GENERAL: alert and no distress  EYES: eyes grossly normal to inspection  HENT: Normocephalic atraumatic.   RESP: no cough present  ABDOMEN: nondistended  MS: no gross musculoskeletal defects noted  SKIN: no suspicious lesions or rashes  NEURO: Alert & Oriented x 3.   PSYCH: mentation appears normal, affect normal        Diagnostic Test Results:     Diagnostic Test  Results:  Labs reviewed in Epic    ASSESSMENT/PLAN:       Adams was seen today for recheck medication.    Diagnoses and all orders for this visit:    Eosinophilic esophagitis  -  stable  - medication refilled today for   pantoprazole (PROTONIX) 40 MG EC tablet; Take 1 tablet (40 mg) by mouth daily Take 30-60 minutes before a meal.            Follow Up Plan:     Patient is instructed to return to Internal Medicine clinic for follow-up visit in 2 months.        Chel Beaulieu MD  Internal Medicine  [facility]      Video-Visit Details    Type of service:  Video Visit   Originating Location (pt. Location): Home    Distant Location (provider location):  Off-site  Platform used for Video Visit: Vicky  Signed Electronically by: Chel Beaulieu MD

## 2024-06-11 ENCOUNTER — TELEPHONE (OUTPATIENT)
Dept: DERMATOLOGY | Facility: CLINIC | Age: 44
End: 2024-06-11
Payer: COMMERCIAL

## 2024-06-11 NOTE — TELEPHONE ENCOUNTER
Called patient to reschedule his appt on 9/24/24 with Dr. Carrizales. He elected to cancel rather than taking the next available appt in Jan. 2025. [email or web address]

## 2024-07-06 ENCOUNTER — HEALTH MAINTENANCE LETTER (OUTPATIENT)
Age: 44
End: 2024-07-06

## 2024-07-14 SDOH — HEALTH STABILITY: PHYSICAL HEALTH: ON AVERAGE, HOW MANY DAYS PER WEEK DO YOU ENGAGE IN MODERATE TO STRENUOUS EXERCISE (LIKE A BRISK WALK)?: 4 DAYS

## 2024-07-14 SDOH — HEALTH STABILITY: PHYSICAL HEALTH: ON AVERAGE, HOW MANY MINUTES DO YOU ENGAGE IN EXERCISE AT THIS LEVEL?: 40 MIN

## 2024-07-14 ASSESSMENT — SOCIAL DETERMINANTS OF HEALTH (SDOH): HOW OFTEN DO YOU GET TOGETHER WITH FRIENDS OR RELATIVES?: TWICE A WEEK

## 2024-07-15 ENCOUNTER — LAB (OUTPATIENT)
Dept: LAB | Facility: CLINIC | Age: 44
End: 2024-07-15
Payer: COMMERCIAL

## 2024-07-15 DIAGNOSIS — Z13.220 LIPID SCREENING: ICD-10-CM

## 2024-07-15 DIAGNOSIS — K75.4 AUTOIMMUNE HEPATITIS (H): ICD-10-CM

## 2024-07-15 DIAGNOSIS — Z12.5 SCREENING FOR PROSTATE CANCER: ICD-10-CM

## 2024-07-15 LAB
ALBUMIN SERPL BCG-MCNC: 4.8 G/DL (ref 3.5–5.2)
ALP SERPL-CCNC: 83 U/L (ref 40–150)
ALT SERPL W P-5'-P-CCNC: 18 U/L (ref 0–70)
AST SERPL W P-5'-P-CCNC: 20 U/L (ref 0–45)
BILIRUB DIRECT SERPL-MCNC: <0.2 MG/DL (ref 0–0.3)
BILIRUB SERPL-MCNC: 0.5 MG/DL
ERYTHROCYTE [DISTWIDTH] IN BLOOD BY AUTOMATED COUNT: 11.2 % (ref 10–15)
HCT VFR BLD AUTO: 47.5 % (ref 40–53)
HGB BLD-MCNC: 16 G/DL (ref 13.3–17.7)
MCH RBC QN AUTO: 30.8 PG (ref 26.5–33)
MCHC RBC AUTO-ENTMCNC: 33.7 G/DL (ref 31.5–36.5)
MCV RBC AUTO: 91 FL (ref 78–100)
PLATELET # BLD AUTO: 193 10E3/UL (ref 150–450)
PROT SERPL-MCNC: 7 G/DL (ref 6.4–8.3)
RBC # BLD AUTO: 5.2 10E6/UL (ref 4.4–5.9)
WBC # BLD AUTO: 4.1 10E3/UL (ref 4–11)

## 2024-07-15 PROCEDURE — 80076 HEPATIC FUNCTION PANEL: CPT

## 2024-07-15 PROCEDURE — 85027 COMPLETE CBC AUTOMATED: CPT

## 2024-07-15 PROCEDURE — G0103 PSA SCREENING: HCPCS

## 2024-07-15 PROCEDURE — 80061 LIPID PANEL: CPT

## 2024-07-15 PROCEDURE — 36415 COLL VENOUS BLD VENIPUNCTURE: CPT

## 2024-07-17 ENCOUNTER — OFFICE VISIT (OUTPATIENT)
Dept: FAMILY MEDICINE | Facility: CLINIC | Age: 44
End: 2024-07-17
Payer: COMMERCIAL

## 2024-07-17 VITALS
RESPIRATION RATE: 16 BRPM | HEART RATE: 59 BPM | HEIGHT: 69 IN | OXYGEN SATURATION: 99 % | DIASTOLIC BLOOD PRESSURE: 71 MMHG | BODY MASS INDEX: 26.22 KG/M2 | TEMPERATURE: 98 F | SYSTOLIC BLOOD PRESSURE: 114 MMHG | WEIGHT: 177 LBS

## 2024-07-17 DIAGNOSIS — Z12.11 SCREEN FOR COLON CANCER: ICD-10-CM

## 2024-07-17 DIAGNOSIS — Z00.00 ROUTINE HISTORY AND PHYSICAL EXAMINATION OF ADULT: Primary | ICD-10-CM

## 2024-07-17 DIAGNOSIS — Z13.220 LIPID SCREENING: ICD-10-CM

## 2024-07-17 DIAGNOSIS — Z12.5 SCREENING FOR PROSTATE CANCER: ICD-10-CM

## 2024-07-17 LAB
CHOLEST SERPL-MCNC: 161 MG/DL
HDLC SERPL-MCNC: 43 MG/DL
LDLC SERPL CALC-MCNC: 104 MG/DL
NONHDLC SERPL-MCNC: 118 MG/DL
PSA SERPL DL<=0.01 NG/ML-MCNC: 0.31 NG/ML (ref 0–2.5)
TRIGL SERPL-MCNC: 70 MG/DL

## 2024-07-17 PROCEDURE — 99396 PREV VISIT EST AGE 40-64: CPT | Performed by: INTERNAL MEDICINE

## 2024-07-17 ASSESSMENT — PAIN SCALES - GENERAL: PAINLEVEL: NO PAIN (0)

## 2024-07-17 NOTE — PROGRESS NOTES
"Preventive Care Visit  [facility]  Chel Beaulieu MD, Internal Medicine  Jul 17, 2024      Assessment & Plan     Screening for prostate cancer  - PSA, screen    Lipid screening  - Lipid panel reflex to direct LDL Fasting    Screen for colon cancer  - Colonoscopy Screening  Referral    Routine history and physical examination of adult      Patient has been advised of split billing requirements and indicates understanding: Yes        BMI  Estimated body mass index is 26.12 kg/m  as calculated from the following:    Height as of this encounter: 1.753 m (5' 9.02\").    Weight as of this encounter: 80.3 kg (177 lb).   Weight management plan: Discussed healthy diet and exercise guidelines    Counseling  Appropriate preventive services were addressed with this patient via screening, questionnaire, or discussion as appropriate for fall prevention, nutrition, physical activity, Tobacco-use cessation, weight loss and cognition.  Checklist reviewing preventive services available has been given to the patient.  Reviewed patient's diet, addressing concerns and/or questions.   He is at risk for psychosocial distress and has been provided with information to reduce risk.       FUTURE APPOINTMENTS:       - Follow-up visit in 1 year    Lluvia Lamas is a 44 year old, presenting for the following:      Health Care Directive  Patient does not have a Health Care Directive or Living Will: Discussed advance care planning with patient; information given to patient to review.    HPI        7/14/2024   General Health   How would you rate your overall physical health? Excellent   Feel stress (tense, anxious, or unable to sleep) Only a little      (!) STRESS CONCERN      7/14/2024   Nutrition   Three or more servings of calcium each day? Yes   Diet: Breakfast skipped   How many servings of fruit and vegetables per day? (!) 2-3   How many sweetened beverages each day? 0-1            7/14/2024   Exercise   Days " per week of moderate/strenous exercise 4 days   Average minutes spent exercising at this level 40 min            7/14/2024   Social Factors   Frequency of gathering with friends or relatives Twice a week   Worry food won't last until get money to buy more No   Food not last or not have enough money for food? No   Do you have housing? (Housing is defined as stable permanent housing and does not include staying ouside in a car, in a tent, in an abandoned building, in an overnight shelter, or couch-surfing.) Yes   Are you worried about losing your housing? No   Lack of transportation? No   Unable to get utilities (heat,electricity)? No            7/14/2024   Dental   Dentist two times every year? Yes            7/14/2024   TB Screening   Were you born outside of the [place]? No          Today's PHQ-2 Score:       7/17/2024     8:30 AM   PHQ-2 ( 1999 [employer])   Q1: Little interest or pleasure in doing things 0   Q2: Feeling down, depressed or hopeless 0   PHQ-2 Score 0         7/14/2024   Substance Use   Alcohol more than 3/day or more than 7/wk Not Applicable   Do you use any other substances recreationally? No        Social History     Tobacco Use    Smoking status: Former     Current packs/day: 1.00     Average packs/day: 1 pack/day for 10.0 years (10.0 ttl pk-yrs)     Types: Cigarettes    Smokeless tobacco: Former     Types: Chew   Vaping Use    Vaping status: Never Used   Substance Use Topics    Alcohol use: No    Drug use: No             7/14/2024   One time HIV Screening   Previous HIV test? No          7/14/2024   STI Screening   New sexual partner(s) since last STI/HIV test? No      ASCVD Risk   The ASCVD Risk score (Manav SEGUNDO, et al., 2019) failed to calculate for the following reasons:    Cannot find a previous HDL lab    Cannot find a previous total cholesterol lab        7/14/2024   Contraception/Family Planning   Questions about contraception or family planning No        Reviewed and updated as needed  "this visit by Provider                    Past Medical History:   Diagnosis Date    Autoimmune hepatitis (H)     Depression     Eosinophilic esophagitis          Review of Systems  Constitutional, HEENT, cardiovascular, pulmonary, GI, , musculoskeletal, neuro, skin, endocrine and psych systems are negative, except as otherwise noted.     Objective    Exam  /71 (BP Location: Right arm, Patient Position: Sitting, Cuff Size: Adult Large)   Pulse 59   Temp 98  F (36.7  C) (Oral)   Resp 16   Ht 1.753 m (5' 9.02\")   Wt 80.3 kg (177 lb)   SpO2 99%   BMI 26.12 kg/m     Estimated body mass index is 26.13 kg/m  as calculated from the following:    Height as of 4/15/24: 1.753 m (5' 9.02\").    Weight as of 4/15/24: 80.3 kg (177 lb).    Physical Exam  GENERAL: alert and no distress  EYES: Eyes grossly normal to inspection, conjunctivae and sclerae normal  HENT: ear canals and TM's normal, nose and mouth without ulcers or lesions  NECK: no asymmetry  RESP: lungs clear to auscultation - no rales, rhonchi or wheezes  CV: regular rate and rhythm, normal S1 S2  ABDOMEN: soft, nontender, bowel sounds normal  MS: no gross musculoskeletal defects noted, no edema  SKIN: no suspicious lesions or rashes  NEURO: Normal strength and tone, mentation intact and speech normal  PSYCH: mentation appears normal, affect normal/bright        Signed Electronically by: Chel Beaulieu MD    "

## 2024-11-11 ENCOUNTER — LAB (OUTPATIENT)
Dept: LAB | Facility: CLINIC | Age: 44
End: 2024-11-11
Payer: COMMERCIAL

## 2024-11-11 DIAGNOSIS — K75.4 AUTOIMMUNE HEPATITIS (H): ICD-10-CM

## 2024-11-11 LAB
ERYTHROCYTE [DISTWIDTH] IN BLOOD BY AUTOMATED COUNT: 11.8 % (ref 10–15)
HCT VFR BLD AUTO: 44.8 % (ref 40–53)
HGB BLD-MCNC: 15.2 G/DL (ref 13.3–17.7)
MCH RBC QN AUTO: 31.5 PG (ref 26.5–33)
MCHC RBC AUTO-ENTMCNC: 33.9 G/DL (ref 31.5–36.5)
MCV RBC AUTO: 93 FL (ref 78–100)
PLATELET # BLD AUTO: 192 10E3/UL (ref 150–450)
RBC # BLD AUTO: 4.83 10E6/UL (ref 4.4–5.9)
WBC # BLD AUTO: 4.5 10E3/UL (ref 4–11)

## 2024-11-11 PROCEDURE — 84460 ALANINE AMINO (ALT) (SGPT): CPT

## 2024-11-11 PROCEDURE — 82247 BILIRUBIN TOTAL: CPT

## 2024-11-11 PROCEDURE — 84155 ASSAY OF PROTEIN SERUM: CPT

## 2024-11-11 PROCEDURE — 84450 TRANSFERASE (AST) (SGOT): CPT

## 2024-11-11 PROCEDURE — 82040 ASSAY OF SERUM ALBUMIN: CPT

## 2024-11-11 PROCEDURE — 84075 ASSAY ALKALINE PHOSPHATASE: CPT

## 2024-11-11 PROCEDURE — 85027 COMPLETE CBC AUTOMATED: CPT

## 2024-11-11 PROCEDURE — 36415 COLL VENOUS BLD VENIPUNCTURE: CPT

## 2024-11-12 LAB
ALBUMIN SERPL BCG-MCNC: 4.6 G/DL (ref 3.5–5.2)
ALP SERPL-CCNC: 99 U/L (ref 40–150)
ALT SERPL W P-5'-P-CCNC: 37 U/L (ref 0–70)
AST SERPL W P-5'-P-CCNC: 28 U/L (ref 0–45)
BILIRUB DIRECT SERPL-MCNC: NORMAL MG/DL
BILIRUB SERPL-MCNC: 0.7 MG/DL
PROT SERPL-MCNC: 6.8 G/DL (ref 6.4–8.3)

## 2025-01-13 ENCOUNTER — LAB (OUTPATIENT)
Dept: LAB | Facility: CLINIC | Age: 45
End: 2025-01-13
Payer: COMMERCIAL

## 2025-01-13 DIAGNOSIS — K75.4 AUTOIMMUNE HEPATITIS (H): ICD-10-CM

## 2025-01-13 LAB
ALBUMIN SERPL BCG-MCNC: 4.6 G/DL (ref 3.5–5.2)
ALP SERPL-CCNC: 99 U/L (ref 40–150)
ALT SERPL W P-5'-P-CCNC: 24 U/L (ref 0–70)
AST SERPL W P-5'-P-CCNC: 23 U/L (ref 0–45)
BILIRUB DIRECT SERPL-MCNC: <0.2 MG/DL (ref 0–0.3)
BILIRUB SERPL-MCNC: 0.7 MG/DL
ERYTHROCYTE [DISTWIDTH] IN BLOOD BY AUTOMATED COUNT: 11.6 % (ref 10–15)
HCT VFR BLD AUTO: 43.4 % (ref 40–53)
HGB BLD-MCNC: 14.9 G/DL (ref 13.3–17.7)
MCH RBC QN AUTO: 30.9 PG (ref 26.5–33)
MCHC RBC AUTO-ENTMCNC: 34.3 G/DL (ref 31.5–36.5)
MCV RBC AUTO: 90 FL (ref 78–100)
PLATELET # BLD AUTO: 186 10E3/UL (ref 150–450)
PROT SERPL-MCNC: 7 G/DL (ref 6.4–8.3)
RBC # BLD AUTO: 4.82 10E6/UL (ref 4.4–5.9)
WBC # BLD AUTO: 4.8 10E3/UL (ref 4–11)

## 2025-01-13 PROCEDURE — 85027 COMPLETE CBC AUTOMATED: CPT

## 2025-01-13 PROCEDURE — 80076 HEPATIC FUNCTION PANEL: CPT

## 2025-01-13 PROCEDURE — 36415 COLL VENOUS BLD VENIPUNCTURE: CPT

## 2025-03-17 ENCOUNTER — PATIENT OUTREACH (OUTPATIENT)
Dept: CARE COORDINATION | Facility: CLINIC | Age: 45
End: 2025-03-17
Payer: COMMERCIAL

## 2025-03-31 DIAGNOSIS — K75.4 AUTOIMMUNE HEPATITIS (H): Primary | ICD-10-CM

## 2025-04-14 ENCOUNTER — LAB (OUTPATIENT)
Dept: LAB | Facility: CLINIC | Age: 45
End: 2025-04-14
Payer: COMMERCIAL

## 2025-04-14 ENCOUNTER — OFFICE VISIT (OUTPATIENT)
Dept: GASTROENTEROLOGY | Facility: CLINIC | Age: 45
End: 2025-04-14
Attending: PHYSICIAN ASSISTANT
Payer: COMMERCIAL

## 2025-04-14 VITALS
SYSTOLIC BLOOD PRESSURE: 122 MMHG | HEIGHT: 69 IN | BODY MASS INDEX: 27.7 KG/M2 | OXYGEN SATURATION: 95 % | WEIGHT: 187 LBS | DIASTOLIC BLOOD PRESSURE: 79 MMHG | HEART RATE: 48 BPM

## 2025-04-14 DIAGNOSIS — K75.4 AUTOIMMUNE HEPATITIS (H): ICD-10-CM

## 2025-04-14 DIAGNOSIS — K75.4 AUTOIMMUNE HEPATITIS (H): Primary | ICD-10-CM

## 2025-04-14 LAB
ALBUMIN SERPL BCG-MCNC: 4.7 G/DL (ref 3.5–5.2)
ALP SERPL-CCNC: 93 U/L (ref 40–150)
ALT SERPL W P-5'-P-CCNC: 22 U/L (ref 0–70)
ANION GAP SERPL CALCULATED.3IONS-SCNC: 8 MMOL/L (ref 7–15)
AST SERPL W P-5'-P-CCNC: 21 U/L (ref 0–45)
BILIRUB DIRECT SERPL-MCNC: 0.27 MG/DL (ref 0–0.3)
BILIRUB SERPL-MCNC: 0.6 MG/DL
BUN SERPL-MCNC: 27.3 MG/DL (ref 6–20)
CALCIUM SERPL-MCNC: 9.9 MG/DL (ref 8.8–10.4)
CHLORIDE SERPL-SCNC: 105 MMOL/L (ref 98–107)
CREAT SERPL-MCNC: 1.05 MG/DL (ref 0.67–1.17)
EGFRCR SERPLBLD CKD-EPI 2021: 89 ML/MIN/1.73M2
ERYTHROCYTE [DISTWIDTH] IN BLOOD BY AUTOMATED COUNT: 11.9 % (ref 10–15)
GLUCOSE SERPL-MCNC: 105 MG/DL (ref 70–99)
HCO3 SERPL-SCNC: 29 MMOL/L (ref 22–29)
HCT VFR BLD AUTO: 45.3 % (ref 40–53)
HGB BLD-MCNC: 15.9 G/DL (ref 13.3–17.7)
IGG SERPL-MCNC: 553 MG/DL (ref 610–1616)
INR PPP: 1.08 (ref 0.85–1.15)
MCH RBC QN AUTO: 31.1 PG (ref 26.5–33)
MCHC RBC AUTO-ENTMCNC: 35.1 G/DL (ref 31.5–36.5)
MCV RBC AUTO: 89 FL (ref 78–100)
PLATELET # BLD AUTO: 193 10E3/UL (ref 150–450)
POTASSIUM SERPL-SCNC: 5 MMOL/L (ref 3.4–5.3)
PROT SERPL-MCNC: 7.3 G/DL (ref 6.4–8.3)
RBC # BLD AUTO: 5.12 10E6/UL (ref 4.4–5.9)
SODIUM SERPL-SCNC: 142 MMOL/L (ref 135–145)
WBC # BLD AUTO: 4.7 10E3/UL (ref 4–11)

## 2025-04-14 PROCEDURE — 80053 COMPREHEN METABOLIC PANEL: CPT | Performed by: PATHOLOGY

## 2025-04-14 PROCEDURE — 82248 BILIRUBIN DIRECT: CPT | Performed by: PATHOLOGY

## 2025-04-14 PROCEDURE — 3074F SYST BP LT 130 MM HG: CPT | Performed by: PHYSICIAN ASSISTANT

## 2025-04-14 PROCEDURE — G2211 COMPLEX E/M VISIT ADD ON: HCPCS | Performed by: PHYSICIAN ASSISTANT

## 2025-04-14 PROCEDURE — 82784 ASSAY IGA/IGD/IGG/IGM EACH: CPT | Performed by: PHYSICIAN ASSISTANT

## 2025-04-14 PROCEDURE — 99000 SPECIMEN HANDLING OFFICE-LAB: CPT | Performed by: PATHOLOGY

## 2025-04-14 PROCEDURE — 99213 OFFICE O/P EST LOW 20 MIN: CPT | Performed by: PHYSICIAN ASSISTANT

## 2025-04-14 PROCEDURE — 1126F AMNT PAIN NOTED NONE PRSNT: CPT | Performed by: PHYSICIAN ASSISTANT

## 2025-04-14 PROCEDURE — 36415 COLL VENOUS BLD VENIPUNCTURE: CPT | Performed by: PATHOLOGY

## 2025-04-14 PROCEDURE — 99214 OFFICE O/P EST MOD 30 MIN: CPT | Mod: 25 | Performed by: PHYSICIAN ASSISTANT

## 2025-04-14 PROCEDURE — 85027 COMPLETE CBC AUTOMATED: CPT | Performed by: PATHOLOGY

## 2025-04-14 PROCEDURE — 3078F DIAST BP <80 MM HG: CPT | Performed by: PHYSICIAN ASSISTANT

## 2025-04-14 PROCEDURE — 85610 PROTHROMBIN TIME: CPT | Performed by: PATHOLOGY

## 2025-04-14 ASSESSMENT — PAIN SCALES - GENERAL: PAINLEVEL_OUTOF10: NO PAIN (0)

## 2025-04-14 NOTE — PROGRESS NOTES
Hepatology Follow-up Clinic note  Satnam Gonzales   Date of Birth 1980      Reason for follow-up: AIH          Assessment/plan:   Satnam Gonzales is a 45 year old male with AIH maintained on Azathioprine with good disease control and compliance. Initial liver biopsy showing Stage 2 Fibrosis. Transaminases remain normal. Liver function is also normal with no stigmata of advanced liver disease.      # Autoimmune hepatitis, well-controlled:  - Continue azathioprine 50 mg daily  - Continue monitoring labs every 3 months to assess for appropriate dosing, side effects for toxicity and efficacy (with CBC, hepatic panel and IgG).   - FibroScan today to check updated fibrosis   - Discussed getting Skin Check every few years with chronic azathioprine     # Colon cancer screening, order in system:   - Colonoscopy at next convenience      # Follow-up in clinic in one year      Nae Do PA-C   [facility] Hepatology clinic    The longitudinal plan of care for the diagnosis(es)/condition(s) as documented were addressed during this visit. Due to the added complexity in care, I will continue to support Adams in the subsequent management and with ongoing continuity of care.  -----------------------------------------------------       HPI:   Satnam Gonzales is a 45 year old male presenting for follow-up.     Autoimmune Hepatitis   Liver Biopsy on 11/1/2018: mildly active hepatitis with chronic features and Stage 2 fibrosis   F-actin - 56   Treatment:   - Prednisone taper started 11/2018, stayed on 2.5 mg since February 2019  - Azathioprine started 11/2018, increased to 75 mg in January 5, 2019  - Azathioprine reduced to 50 mg in 2021    Patient was last seen by 4/15/2024. on Recent hospitalizations or ER visits  New medications: No new medications reported.    - Appetite is okay.  - No significant weight changes, with fluctuations within 10 pounds.  - Recently returned from a cruise.  - No issues with azathioprine intake  or refills.  - Colonoscopy was ordered but not yet scheduled due to personal delays.  - Regular bowel movements, no blood or black stools.  - No recent fever, sweats, or chills.  - No major illnesses during the winter, unlike previous years.  - Had a dermatology appointment scheduled for a persistent dry skin area, which resolved before the appointment, leading to cancellation.  - Practices sun protection and uses sunscreen regularly.    Previous work-up:   Lab Results   Component Value Date    HEPBANG Nonreactive 10/15/2018    HBCAB Nonreactive 10/23/2018    AUSAB 125.01 (H) 10/23/2018    HCVAB Nonreactive 10/15/2018    TTG <1 10/23/2018    TTGG <1 10/23/2018    DULCE Negative 10/23/2018    CHOL 161 07/15/2024    HDL 43 07/15/2024     (H) 07/15/2024    TRIG 70 07/15/2024    A1C 5.2 10/23/2018      Recent Labs   Lab Test 01/13/25  1544 11/11/24  1550 07/15/24  0850 04/15/24  0729 02/15/24  1514 11/03/23  1504 07/20/23  1329 04/17/23  0733 04/01/22  1132 11/02/21  1609   ALKPHOS 99 99 83 84 78 82 70 81 78 81   ALT 24 37 18 26 16 25 24 19 28 18   AST 23 28 20 21 23 25 27 20 16 12   BILITOTAL 0.7 0.7 0.5 0.9 0.7 0.7 0.7 0.7 0.8 0.6        Medical hx Surgical hx   Past Medical History:   Diagnosis Date    Autoimmune hepatitis (H)     Depression     Eosinophilic esophagitis     Past Surgical History:   Procedure Laterality Date    NO HISTORY OF SURGERY                   Medications:     Current Outpatient Medications   Medication Sig Dispense Refill    azaTHIOprine (IMURAN) 50 MG tablet Take 1 tablet (50 mg) by mouth daily Need appt for further refills 90 tablet 3    fexofenadine (ALLEGRA) 180 MG tablet Take 180 mg by mouth daily as needed for allergies      pantoprazole (PROTONIX) 40 MG EC tablet Take 1 tablet (40 mg) by mouth daily Take 30-60 minutes before a meal. 90 tablet 3     No current facility-administered medications for this visit.            Allergies:     Allergies   Allergen Reactions    Seasonal  "Allergies             Review of Systems:   10 points ROS was obtained and highlighted in the HPI, otherwise negative.          Physical Exam:   VS:  /79 (BP Location: Right arm, Patient Position: Sitting)   Pulse (!) 48   Ht 1.753 m (5' 9.02\")   Wt 84.8 kg (187 lb)   SpO2 95%   BMI 27.60 kg/m        Gen- well, NAD, A+Ox3, normal color  Lym- no palpable LAD  Abd- soft, non-tender.   Extr- hands normal, no DANNIELLE  Skin- no rash or jaundice  Neuro- no asterixis  Psych- normal mood         Data:   Reviewed in person and significant for:    Lab Results   Component Value Date     04/15/2024     08/04/2020      Lab Results   Component Value Date    POTASSIUM 4.7 04/15/2024    POTASSIUM 4.2 08/04/2020     Lab Results   Component Value Date    CHLORIDE 103 04/15/2024    CHLORIDE 105 08/04/2020     Lab Results   Component Value Date    CO2 30 04/15/2024    CO2 28 08/04/2020     Lab Results   Component Value Date    BUN 15.5 04/15/2024    BUN 13 08/04/2020     Lab Results   Component Value Date    CR 1.09 04/15/2024    CR 1.05 08/04/2020       Lab Results   Component Value Date    WBC 4.7 04/14/2025    WBC 4.4 07/09/2021     Lab Results   Component Value Date    HGB 15.9 04/14/2025    HGB 15.6 07/09/2021     Lab Results   Component Value Date    HCT 45.3 04/14/2025    HCT 43.9 07/09/2021     Lab Results   Component Value Date    MCV 89 04/14/2025    MCV 91 07/09/2021     Lab Results   Component Value Date     04/14/2025     07/09/2021       Lab Results   Component Value Date    AST 23 01/13/2025    AST 16 07/09/2021     Lab Results   Component Value Date    ALT 24 01/13/2025    ALT 25 07/09/2021     No results found for: \"BILICONJ\"   Lab Results   Component Value Date    BILITOTAL 0.7 01/13/2025    BILITOTAL 0.9 07/09/2021       Lab Results   Component Value Date    ALBUMIN 4.6 01/13/2025    ALBUMIN 4.2 04/01/2022    ALBUMIN 4.4 07/09/2021     Lab Results   Component Value Date    PROTTOTAL " 7.0 01/13/2025    PROTTOTAL 7.0 07/09/2021      Lab Results   Component Value Date    ALKPHOS 99 01/13/2025    ALKPHOS 73 07/09/2021       Lab Results   Component Value Date    INR 1.08 04/14/2025    INR 1.05 08/04/2020

## 2025-04-14 NOTE — LETTER
4/14/2025      Satnam Gonzales  [address] [place] [address]      Dear Colleague,    Thank you for referring your patient, Satnam Gonzales, to the [facility] [place]. Please see a copy of my visit note below.    Hepatology Follow-up Clinic note  Satnam Gonzales   Date of Birth 1980      Reason for follow-up: AIH          Assessment/plan:   Satnam Gonzales is a 45 year old male with AIH maintained on Azathioprine with good disease control and compliance. Initial liver biopsy showing Stage 2 Fibrosis. Transaminases remain normal. Liver function is also normal with no stigmata of advanced liver disease.      # Autoimmune hepatitis, well-controlled:  - Continue azathioprine 50 mg daily  - Continue monitoring labs every 3 months to assess for appropriate dosing, side effects for toxicity and efficacy (with CBC, hepatic panel and IgG).   - FibroScan today to check updated fibrosis   - Discussed getting Skin Check every few years with chronic azathioprine     # Colon cancer screening, order in system:   - Colonoscopy at next convenience      # Follow-up in clinic in one year      Nae Do PA-C   [facility] Hepatology clinic    The longitudinal plan of care for the diagnosis(es)/condition(s) as documented were addressed during this visit. Due to the added complexity in care, I will continue to support Adams in the subsequent management and with ongoing continuity of care.  -----------------------------------------------------       HPI:   Satnam Gonzales is a 45 year old male presenting for follow-up.     Autoimmune Hepatitis   Liver Biopsy on 11/1/2018: mildly active hepatitis with chronic features and Stage 2 fibrosis   F-actin - 56   Treatment:   - Prednisone taper started 11/2018, stayed on 2.5 mg since February 2019  - Azathioprine started 11/2018, increased to 75 mg in January 5, 2019  - Azathioprine reduced to 50 mg in 2021    Patient was last seen by 4/15/2024. on  Recent hospitalizations or ER visits  New medications: No new medications reported.    - Appetite is okay.  - No significant weight changes, with fluctuations within 10 pounds.  - Recently returned from a cruise.  - No issues with azathioprine intake or refills.  - Colonoscopy was ordered but not yet scheduled due to personal delays.  - Regular bowel movements, no blood or black stools.  - No recent fever, sweats, or chills.  - No major illnesses during the winter, unlike previous years.  - Had a dermatology appointment scheduled for a persistent dry skin area, which resolved before the appointment, leading to cancellation.  - Practices sun protection and uses sunscreen regularly.    Previous work-up:   Lab Results   Component Value Date    HEPBANG Nonreactive 10/15/2018    HBCAB Nonreactive 10/23/2018    AUSAB 125.01 (H) 10/23/2018    HCVAB Nonreactive 10/15/2018    TTG <1 10/23/2018    TTGG <1 10/23/2018    DULCE Negative 10/23/2018    CHOL 161 07/15/2024    HDL 43 07/15/2024     (H) 07/15/2024    TRIG 70 07/15/2024    A1C 5.2 10/23/2018      Recent Labs   Lab Test 01/13/25  1544 11/11/24  1550 07/15/24  0850 04/15/24  0729 02/15/24  1514 11/03/23  1504 07/20/23  1329 04/17/23  0733 04/01/22  1132 11/02/21  1609   ALKPHOS 99 99 83 84 78 82 70 81 78 81   ALT 24 37 18 26 16 25 24 19 28 18   AST 23 28 20 21 23 25 27 20 16 12   BILITOTAL 0.7 0.7 0.5 0.9 0.7 0.7 0.7 0.7 0.8 0.6        Medical hx Surgical hx   Past Medical History:   Diagnosis Date     Autoimmune hepatitis (H)      Depression      Eosinophilic esophagitis     Past Surgical History:   Procedure Laterality Date     NO HISTORY OF SURGERY                   Medications:     Current Outpatient Medications   Medication Sig Dispense Refill     azaTHIOprine (IMURAN) 50 MG tablet Take 1 tablet (50 mg) by mouth daily Need appt for further refills 90 tablet 3     fexofenadine (ALLEGRA) 180 MG tablet Take 180 mg by mouth daily as needed for allergies        "pantoprazole (PROTONIX) 40 MG EC tablet Take 1 tablet (40 mg) by mouth daily Take 30-60 minutes before a meal. 90 tablet 3     No current facility-administered medications for this visit.            Allergies:     Allergies   Allergen Reactions     Seasonal Allergies             Review of Systems:   10 points ROS was obtained and highlighted in the HPI, otherwise negative.          Physical Exam:   VS:  /79 (BP Location: Right arm, Patient Position: Sitting)   Pulse (!) 48   Ht 1.753 m (5' 9.02\")   Wt 84.8 kg (187 lb)   SpO2 95%   BMI 27.60 kg/m        Gen- well, NAD, A+Ox3, normal color  Lym- no palpable LAD  Abd- soft, non-tender.   Extr- hands normal, no DANNIELLE  Skin- no rash or jaundice  Neuro- no asterixis  Psych- normal mood         Data:   Reviewed in person and significant for:    Lab Results   Component Value Date     04/15/2024     08/04/2020      Lab Results   Component Value Date    POTASSIUM 4.7 04/15/2024    POTASSIUM 4.2 08/04/2020     Lab Results   Component Value Date    CHLORIDE 103 04/15/2024    CHLORIDE 105 08/04/2020     Lab Results   Component Value Date    CO2 30 04/15/2024    CO2 28 08/04/2020     Lab Results   Component Value Date    BUN 15.5 04/15/2024    BUN 13 08/04/2020     Lab Results   Component Value Date    CR 1.09 04/15/2024    CR 1.05 08/04/2020       Lab Results   Component Value Date    WBC 4.7 04/14/2025    WBC 4.4 07/09/2021     Lab Results   Component Value Date    HGB 15.9 04/14/2025    HGB 15.6 07/09/2021     Lab Results   Component Value Date    HCT 45.3 04/14/2025    HCT 43.9 07/09/2021     Lab Results   Component Value Date    MCV 89 04/14/2025    MCV 91 07/09/2021     Lab Results   Component Value Date     04/14/2025     07/09/2021       Lab Results   Component Value Date    AST 23 01/13/2025    AST 16 07/09/2021     Lab Results   Component Value Date    ALT 24 01/13/2025    ALT 25 07/09/2021     No results found for: \"BILICONJ\"   Lab " Results   Component Value Date    BILITOTAL 0.7 01/13/2025    BILITOTAL 0.9 07/09/2021       Lab Results   Component Value Date    ALBUMIN 4.6 01/13/2025    ALBUMIN 4.2 04/01/2022    ALBUMIN 4.4 07/09/2021     Lab Results   Component Value Date    PROTTOTAL 7.0 01/13/2025    PROTTOTAL 7.0 07/09/2021      Lab Results   Component Value Date    ALKPHOS 99 01/13/2025    ALKPHOS 73 07/09/2021       Lab Results   Component Value Date    INR 1.08 04/14/2025    INR 1.05 08/04/2020         Again, thank you for allowing me to participate in the care of your patient.        Sincerely,        Nae Do PA-C    Electronically signed

## 2025-04-14 NOTE — PATIENT INSTRUCTIONS
"Based on our discussion, I have outlined the following instructions for you:    - Keep taking your azathioprine medication as you have been.  - Get a Fibroscan today to check how stiff your liver is and to see if there is any fat in it.  - Have your regular lab tests done every three months.  - Think about getting a test that shows your average blood sugar levels over three months to keep an eye on your glucose. Can discuss with your PCP as well.   - Make sure to have your skin checked by a dermatologist every few years, especially if you notice any changes in your skin.    Results of your FibroScan:   The scan to assess scar tissue (fibrosis) showed that you have minimal to no scarring. We give the liver fibrosis a score from 0 to 4. 0 means no scar tissue. 1 means a little bit (mild). 2 is some (moderate). 3 is called \"bridging fibrosis\" and represents significant scarring (severe). 4 indicates cirrhosis.     Your fibrosis score was 0-1. Your liver biopsy originally showed Stage 2.     S0 (Steatosis Grade) means 0-10% amount of liver with fatty changes    Thank you again for your visit, and we look forward to supporting you in your journey.     It was a pleasure to see you at your recent visit. Please let me know if you have any questions or concerns.     Clinic Staff - [phone] option 3     Sincerely,     Nae Do PA-C     [address], [id number]  [place]  [address].    "

## 2025-04-15 LAB
IGA SERPL-MCNC: 108 MG/DL (ref 84–499)
IGM SERPL-MCNC: 135 MG/DL (ref 35–242)

## 2025-04-16 DIAGNOSIS — K20.0 EOSINOPHILIC ESOPHAGITIS: ICD-10-CM

## 2025-04-16 DIAGNOSIS — K75.4 AUTOIMMUNE HEPATITIS (H): ICD-10-CM

## 2025-04-16 RX ORDER — AZATHIOPRINE 50 MG/1
50 TABLET ORAL DAILY
Qty: 90 TABLET | Refills: 3 | Status: SHIPPED | OUTPATIENT
Start: 2025-04-16

## 2025-04-16 RX ORDER — PANTOPRAZOLE SODIUM 40 MG/1
40 TABLET, DELAYED RELEASE ORAL DAILY
Qty: 90 TABLET | Refills: 0 | Status: SHIPPED | OUTPATIENT
Start: 2025-04-16

## 2025-07-09 ENCOUNTER — LAB (OUTPATIENT)
Dept: LAB | Facility: CLINIC | Age: 45
End: 2025-07-09
Payer: COMMERCIAL

## 2025-07-09 DIAGNOSIS — K75.4 AUTOIMMUNE HEPATITIS (H): ICD-10-CM

## 2025-07-09 LAB
ALBUMIN SERPL BCG-MCNC: 4.7 G/DL (ref 3.5–5.2)
ALP SERPL-CCNC: 84 U/L (ref 40–150)
ALT SERPL W P-5'-P-CCNC: 23 U/L (ref 0–70)
AST SERPL W P-5'-P-CCNC: 22 U/L (ref 0–45)
BILIRUB SERPL-MCNC: 1 MG/DL
BILIRUBIN DIRECT (ROCHE PRO & PURE): 0.35 MG/DL (ref 0–0.45)
ERYTHROCYTE [DISTWIDTH] IN BLOOD BY AUTOMATED COUNT: 11.6 % (ref 10–15)
HCT VFR BLD AUTO: 45.1 % (ref 40–53)
HGB BLD-MCNC: 15.8 G/DL (ref 13.3–17.7)
MCH RBC QN AUTO: 30.9 PG (ref 26.5–33)
MCHC RBC AUTO-ENTMCNC: 35 G/DL (ref 31.5–36.5)
MCV RBC AUTO: 88 FL (ref 78–100)
PLATELET # BLD AUTO: 184 10E3/UL (ref 150–450)
PROT SERPL-MCNC: 7.3 G/DL (ref 6.4–8.3)
RBC # BLD AUTO: 5.12 10E6/UL (ref 4.4–5.9)
WBC # BLD AUTO: 6.3 10E3/UL (ref 4–11)

## 2025-07-09 PROCEDURE — 36415 COLL VENOUS BLD VENIPUNCTURE: CPT

## 2025-07-09 PROCEDURE — 80076 HEPATIC FUNCTION PANEL: CPT

## 2025-07-09 PROCEDURE — 85027 COMPLETE CBC AUTOMATED: CPT

## 2025-07-16 DIAGNOSIS — K20.0 EOSINOPHILIC ESOPHAGITIS: ICD-10-CM

## 2025-07-16 RX ORDER — PANTOPRAZOLE SODIUM 40 MG/1
40 TABLET, DELAYED RELEASE ORAL DAILY
Qty: 90 TABLET | Refills: 0 | Status: SHIPPED | OUTPATIENT
Start: 2025-07-16

## 2025-07-22 SDOH — HEALTH STABILITY: PHYSICAL HEALTH: ON AVERAGE, HOW MANY DAYS PER WEEK DO YOU ENGAGE IN MODERATE TO STRENUOUS EXERCISE (LIKE A BRISK WALK)?: 3 DAYS

## 2025-07-22 SDOH — HEALTH STABILITY: PHYSICAL HEALTH: ON AVERAGE, HOW MANY MINUTES DO YOU ENGAGE IN EXERCISE AT THIS LEVEL?: 40 MIN

## 2025-07-22 ASSESSMENT — SOCIAL DETERMINANTS OF HEALTH (SDOH): HOW OFTEN DO YOU GET TOGETHER WITH FRIENDS OR RELATIVES?: TWICE A WEEK

## 2025-07-23 ENCOUNTER — OFFICE VISIT (OUTPATIENT)
Dept: FAMILY MEDICINE | Facility: CLINIC | Age: 45
End: 2025-07-23
Payer: COMMERCIAL

## 2025-07-23 VITALS
HEIGHT: 68 IN | RESPIRATION RATE: 18 BRPM | OXYGEN SATURATION: 97 % | DIASTOLIC BLOOD PRESSURE: 71 MMHG | SYSTOLIC BLOOD PRESSURE: 114 MMHG | HEART RATE: 54 BPM | WEIGHT: 182.2 LBS | TEMPERATURE: 97.8 F | BODY MASS INDEX: 27.61 KG/M2

## 2025-07-23 DIAGNOSIS — Z12.5 SCREENING FOR PROSTATE CANCER: ICD-10-CM

## 2025-07-23 DIAGNOSIS — Z11.4 SCREENING FOR HIV (HUMAN IMMUNODEFICIENCY VIRUS): ICD-10-CM

## 2025-07-23 DIAGNOSIS — Z12.11 SCREEN FOR COLON CANCER: ICD-10-CM

## 2025-07-23 DIAGNOSIS — K20.0 EOSINOPHILIC ESOPHAGITIS: ICD-10-CM

## 2025-07-23 DIAGNOSIS — Z00.00 ROUTINE HISTORY AND PHYSICAL EXAMINATION OF ADULT: Primary | ICD-10-CM

## 2025-07-23 DIAGNOSIS — E78.5 HYPERLIPIDEMIA LDL GOAL <100: ICD-10-CM

## 2025-07-23 LAB
CHOLEST SERPL-MCNC: 159 MG/DL
FASTING STATUS PATIENT QL REPORTED: YES
HDLC SERPL-MCNC: 41 MG/DL
HIV 1+2 AB+HIV1 P24 AG SERPL QL IA: NONREACTIVE
LDLC SERPL CALC-MCNC: 102 MG/DL
NONHDLC SERPL-MCNC: 118 MG/DL
PSA SERPL DL<=0.01 NG/ML-MCNC: 0.34 NG/ML (ref 0–2.5)
TRIGL SERPL-MCNC: 78 MG/DL

## 2025-07-23 PROCEDURE — 90677 PCV20 VACCINE IM: CPT | Performed by: INTERNAL MEDICINE

## 2025-07-23 PROCEDURE — 90472 IMMUNIZATION ADMIN EACH ADD: CPT | Performed by: INTERNAL MEDICINE

## 2025-07-23 PROCEDURE — G0103 PSA SCREENING: HCPCS | Performed by: INTERNAL MEDICINE

## 2025-07-23 PROCEDURE — 99213 OFFICE O/P EST LOW 20 MIN: CPT | Mod: 25 | Performed by: INTERNAL MEDICINE

## 2025-07-23 PROCEDURE — 80061 LIPID PANEL: CPT | Performed by: INTERNAL MEDICINE

## 2025-07-23 PROCEDURE — 87389 HIV-1 AG W/HIV-1&-2 AB AG IA: CPT | Performed by: INTERNAL MEDICINE

## 2025-07-23 PROCEDURE — G2211 COMPLEX E/M VISIT ADD ON: HCPCS | Performed by: INTERNAL MEDICINE

## 2025-07-23 PROCEDURE — 36415 COLL VENOUS BLD VENIPUNCTURE: CPT | Performed by: INTERNAL MEDICINE

## 2025-07-23 PROCEDURE — 90471 IMMUNIZATION ADMIN: CPT | Performed by: INTERNAL MEDICINE

## 2025-07-23 PROCEDURE — 90715 TDAP VACCINE 7 YRS/> IM: CPT | Performed by: INTERNAL MEDICINE

## 2025-07-23 PROCEDURE — 99396 PREV VISIT EST AGE 40-64: CPT | Mod: 25 | Performed by: INTERNAL MEDICINE

## 2025-07-23 PROCEDURE — 3078F DIAST BP <80 MM HG: CPT | Performed by: INTERNAL MEDICINE

## 2025-07-23 PROCEDURE — 3074F SYST BP LT 130 MM HG: CPT | Performed by: INTERNAL MEDICINE

## 2025-07-23 PROCEDURE — 1126F AMNT PAIN NOTED NONE PRSNT: CPT | Performed by: INTERNAL MEDICINE

## 2025-07-23 RX ORDER — PANTOPRAZOLE SODIUM 40 MG/1
40 TABLET, DELAYED RELEASE ORAL DAILY
Qty: 90 TABLET | Refills: 0 | Status: SHIPPED | OUTPATIENT
Start: 2025-07-23

## 2025-07-23 ASSESSMENT — PAIN SCALES - GENERAL: PAINLEVEL_OUTOF10: NO PAIN (0)

## 2025-07-23 NOTE — PROGRESS NOTES
"Preventive Care Visit  [facility]  Chel Beaulieu MD, Internal Medicine  Jul 23, 2025      Assessment & Plan     Screen for colon cancer  - Colonoscopy Screening  Referral    Screening for HIV (human immunodeficiency virus)  - HIV Antigen Antibody Combo  - HIV Antigen Antibody Combo    Routine history and physical examination of adult  - diet, exercise    Eosinophilic esophagitis  - stable  - medication refilled today for pantoprazole (PROTONIX) 40 MG EC tablet  Dispense: 90 tablet; Refill: 0    Hyperlipidemia LDL goal <100  - Lipid panel reflex to direct LDL Fasting  - Lipid panel reflex to direct LDL Fasting    Screening for prostate cancer  - PSA, screen  - PSA, screen    Patient has been advised of split billing requirements and indicates understanding: Yes    BMI  Estimated body mass index is 27.58 kg/m  as calculated from the following:    Height as of this encounter: 1.731 m (5' 8.15\").    Weight as of this encounter: 82.6 kg (182 lb 3.2 oz).   Weight management plan: Discussed healthy diet and exercise guidelines    Counseling  Appropriate preventive services were addressed with this patient via screening, questionnaire, or discussion as appropriate for fall prevention, nutrition, physical activity, Tobacco-use cessation, social engagement, weight loss and cognition.  Checklist reviewing preventive services available has been given to the patient.  Reviewed patient's diet, addressing concerns and/or questions.   He is at risk for lack of exercise and has been provided with information to increase physical activity for the benefit of his well-being.   Reviewed preventive health counseling, as reflected in patient instructions  Special attention given to:        Regular exercise       Healthy diet/nutrition    Follow-up in 1 year      Lluvia Lamas is a 45 year old, presenting for the following:  Physical (Is fasting )        7/23/2025     8:11 AM   Additional Questions "   Roomed by Joe CULLEN  Patient presents to internal medicine clinic today for yearly physical exam.       Advance Care Planning    Patient states has Health Care Directive and will send to [employer].        7/22/2025   General Health   How would you rate your overall physical health? Good   Feel stress (tense, anxious, or unable to sleep) Only a little   (!) STRESS CONCERN      7/22/2025   Nutrition   Three or more servings of calcium each day? Yes   Diet: Breakfast skipped   How many servings of fruit and vegetables per day? (!) 2-3   How many sweetened beverages each day? 0-1         7/22/2025   Exercise   Days per week of moderate/strenous exercise 3 days   Average minutes spent exercising at this level 40 min         7/22/2025   Social Factors   Frequency of gathering with friends or relatives Twice a week   Worry food won't last until get money to buy more No   Food not last or not have enough money for food? No   Do you have housing? (Housing is defined as stable permanent housing and does not include staying outside in a car, in a tent, in an abandoned building, in an overnight shelter, or couch-surfing.) Yes   Are you worried about losing your housing? No   Lack of transportation? No   Unable to get utilities (heat,electricity)? No         7/22/2025   Dental   Dentist two times every year? Yes         Today's PHQ-2 Score:       7/23/2025     8:10 AM   PHQ-2 ( 1999 [employer])   Q1: Little interest or pleasure in doing things 0   Q2: Feeling down, depressed or hopeless 1   PHQ-2 Score 1    Q1: Little interest or pleasure in doing things Not at all   Q2: Feeling down, depressed or hopeless Several days   PHQ-2 Score 1       Patient-reported           7/22/2025   Substance Use   Alcohol more than 3/day or more than 7/wk Not Applicable   Do you use any other substances recreationally? No     Social History     Tobacco Use    Smoking status: Former     Current packs/day: 1.00     Average  "packs/day: 1 pack/day for 10.0 years (10.0 ttl pk-yrs)     Types: Cigarettes     Passive exposure: Past    Smokeless tobacco: Former     Types: Chew   Vaping Use    Vaping status: Never Used   Substance Use Topics    Alcohol use: No    Drug use: No             7/22/2025   One time HIV Screening   Previous HIV test? I don't know         7/22/2025   STI Screening   New sexual partner(s) since last STI/HIV test? No   ASCVD Risk   The 10-year ASCVD risk score (Manav SEGUNDO, et al., 2019) is: 1.6%    Values used to calculate the score:      Age: 45 years      Sex: Male      Is Non- : No      Diabetic: No      Tobacco smoker: No      Systolic Blood Pressure: 114 mmHg      Is BP treated: No      HDL Cholesterol: 43 mg/dL      Total Cholesterol: 161 mg/dL        7/22/2025   Contraception/Family Planning   Questions about contraception or family planning No        Reviewed and updated as needed this visit by Provider                    Past Medical History:   Diagnosis Date    Autoimmune hepatitis (H)     Depression     Depressive disorder 1991    Eosinophilic esophagitis          Review of Systems  Constitutional, HEENT, cardiovascular, pulmonary, GI, , musculoskeletal, neuro, skin, endocrine and psych systems are negative, except as otherwise noted.     Objective    Exam  /71 (BP Location: Right arm, Patient Position: Sitting, Cuff Size: Adult Large)   Pulse 54   Temp 97.8  F (36.6  C) (Oral)   Resp 18   Ht 1.731 m (5' 8.15\")   Wt 82.6 kg (182 lb 3.2 oz)   SpO2 97%   BMI 27.58 kg/m     Estimated body mass index is 27.58 kg/m  as calculated from the following:    Height as of this encounter: 1.731 m (5' 8.15\").    Weight as of this encounter: 82.6 kg (182 lb 3.2 oz).    Physical Exam  GENERAL: alert and no distress  EYES: Eyes grossly normal to inspection, conjunctivae and sclerae normal  HENT: ear canals and TM's normal, nose and mouth without ulcers or lesions  NECK: no " asymmetry  RESP: lungs clear to auscultation - no rales, rhonchi or wheezes  CV: regular rate and rhythm, normal S1 S2  ABDOMEN: soft, nontender, bowel sounds normal  MS: no gross musculoskeletal defects noted, no edema  SKIN: no suspicious lesions or rashes  NEURO: Normal strength and tone, mentation intact and speech normal  PSYCH: mentation appears normal, affect normal/bright        Signed Electronically by: Chel Beaulieu MD

## 2025-08-05 ENCOUNTER — TELEPHONE (OUTPATIENT)
Dept: GASTROENTEROLOGY | Facility: CLINIC | Age: 45
End: 2025-08-05

## 2025-08-06 ENCOUNTER — TELEPHONE (OUTPATIENT)
Dept: GASTROENTEROLOGY | Facility: CLINIC | Age: 45
End: 2025-08-06

## 2025-08-21 ENCOUNTER — HOSPITAL ENCOUNTER (OUTPATIENT)
Facility: CLINIC | Age: 45
Discharge: HOME | End: 2025-08-21
Attending: COLON & RECTAL SURGERY | Admitting: COLON & RECTAL SURGERY
Payer: COMMERCIAL

## 2025-08-21 VITALS
SYSTOLIC BLOOD PRESSURE: 102 MMHG | OXYGEN SATURATION: 97 % | WEIGHT: 182 LBS | RESPIRATION RATE: 18 BRPM | HEIGHT: 68 IN | DIASTOLIC BLOOD PRESSURE: 74 MMHG | BODY MASS INDEX: 27.58 KG/M2 | HEART RATE: 53 BPM

## 2025-08-21 LAB — COLONOSCOPY: NORMAL

## 2025-08-21 PROCEDURE — 250N000011 HC RX IP 250 OP 636: Performed by: COLON & RECTAL SURGERY

## 2025-08-21 PROCEDURE — G0121 COLON CA SCRN NOT HI RSK IND: HCPCS | Performed by: COLON & RECTAL SURGERY

## 2025-08-21 PROCEDURE — G0500 MOD SEDAT ENDO SERVICE >5YRS: HCPCS | Performed by: COLON & RECTAL SURGERY

## 2025-08-21 RX ORDER — LIDOCAINE 40 MG/G
CREAM TOPICAL
Status: DISCONTINUED | OUTPATIENT
Start: 2025-08-21 | End: 2025-08-21 | Stop reason: HOSPADM

## 2025-08-21 RX ORDER — FENTANYL CITRATE 50 UG/ML
INJECTION, SOLUTION INTRAMUSCULAR; INTRAVENOUS PRN
Status: DISCONTINUED | OUTPATIENT
Start: 2025-08-21 | End: 2025-08-21 | Stop reason: HOSPADM

## 2025-08-21 RX ORDER — ONDANSETRON 2 MG/ML
4 INJECTION INTRAMUSCULAR; INTRAVENOUS
Status: DISCONTINUED | OUTPATIENT
Start: 2025-08-21 | End: 2025-08-21 | Stop reason: HOSPADM

## 2025-08-21 ASSESSMENT — ACTIVITIES OF DAILY LIVING (ADL)
ADLS_ACUITY_SCORE: 41
ADLS_ACUITY_SCORE: 41

## (undated) RX ORDER — FENTANYL CITRATE 50 UG/ML
INJECTION, SOLUTION INTRAMUSCULAR; INTRAVENOUS
Status: DISPENSED
Start: 2025-08-21

## (undated) RX ORDER — FENTANYL CITRATE 50 UG/ML
INJECTION, SOLUTION INTRAMUSCULAR; INTRAVENOUS
Status: DISPENSED
Start: 2018-11-01

## (undated) RX ORDER — LIDOCAINE HYDROCHLORIDE 10 MG/ML
INJECTION, SOLUTION INFILTRATION; PERINEURAL
Status: DISPENSED
Start: 2018-11-01

## (undated) RX ORDER — IBUPROFEN 200 MG
TABLET ORAL
Status: DISPENSED
Start: 2018-11-01

## (undated) RX ORDER — SODIUM CHLORIDE 9 MG/ML
INJECTION, SOLUTION INTRAVENOUS
Status: DISPENSED
Start: 2018-11-01